# Patient Record
Sex: FEMALE | Race: BLACK OR AFRICAN AMERICAN | NOT HISPANIC OR LATINO | Employment: OTHER | URBAN - METROPOLITAN AREA
[De-identification: names, ages, dates, MRNs, and addresses within clinical notes are randomized per-mention and may not be internally consistent; named-entity substitution may affect disease eponyms.]

---

## 2019-12-12 ENCOUNTER — HISTORICAL (OUTPATIENT)
Dept: ADMINISTRATIVE | Facility: HOSPITAL | Age: 84
End: 2019-12-12

## 2019-12-20 ENCOUNTER — HISTORICAL (OUTPATIENT)
Dept: ADMINISTRATIVE | Facility: HOSPITAL | Age: 84
End: 2019-12-20

## 2019-12-27 ENCOUNTER — HISTORICAL (OUTPATIENT)
Dept: ADMINISTRATIVE | Facility: HOSPITAL | Age: 84
End: 2019-12-27

## 2020-01-03 ENCOUNTER — HISTORICAL (OUTPATIENT)
Dept: ADMINISTRATIVE | Facility: HOSPITAL | Age: 85
End: 2020-01-03

## 2020-01-10 ENCOUNTER — HISTORICAL (OUTPATIENT)
Dept: ADMINISTRATIVE | Facility: HOSPITAL | Age: 85
End: 2020-01-10

## 2020-01-17 ENCOUNTER — HISTORICAL (OUTPATIENT)
Dept: ADMINISTRATIVE | Facility: HOSPITAL | Age: 85
End: 2020-01-17

## 2020-01-24 ENCOUNTER — HISTORICAL (OUTPATIENT)
Dept: ADMINISTRATIVE | Facility: HOSPITAL | Age: 85
End: 2020-01-24

## 2020-01-31 ENCOUNTER — HISTORICAL (OUTPATIENT)
Dept: ADMINISTRATIVE | Facility: HOSPITAL | Age: 85
End: 2020-01-31

## 2020-02-14 ENCOUNTER — HISTORICAL (OUTPATIENT)
Dept: ADMINISTRATIVE | Facility: HOSPITAL | Age: 85
End: 2020-02-14

## 2020-02-21 ENCOUNTER — HISTORICAL (OUTPATIENT)
Dept: ADMINISTRATIVE | Facility: HOSPITAL | Age: 85
End: 2020-02-21

## 2021-01-12 ENCOUNTER — HISTORICAL (OUTPATIENT)
Dept: ADMINISTRATIVE | Facility: HOSPITAL | Age: 86
End: 2021-01-12

## 2021-01-12 LAB
ABS NEUT (OLG): 4.04 X10(3)/MCL (ref 2.1–9.2)
ALBUMIN SERPL-MCNC: 3.3 GM/DL (ref 3.4–4.8)
ALBUMIN/GLOB SERPL: 1 RATIO (ref 1.1–2)
ALP SERPL-CCNC: 108 UNIT/L (ref 40–150)
ALT SERPL-CCNC: 27 UNIT/L (ref 0–55)
AST SERPL-CCNC: 31 UNIT/L (ref 5–34)
BASOPHILS # BLD AUTO: 0 X10(3)/MCL (ref 0–0.2)
BASOPHILS NFR BLD AUTO: 0 %
BILIRUB SERPL-MCNC: 0.5 MG/DL
BILIRUBIN DIRECT+TOT PNL SERPL-MCNC: 0.2 MG/DL (ref 0–0.5)
BILIRUBIN DIRECT+TOT PNL SERPL-MCNC: 0.3 MG/DL (ref 0–0.8)
BUN SERPL-MCNC: 19 MG/DL (ref 9.8–20.1)
CALCIUM SERPL-MCNC: 8.6 MG/DL (ref 8.4–10.2)
CHLORIDE SERPL-SCNC: 106 MMOL/L (ref 98–107)
CHOLEST SERPL-MCNC: 201 MG/DL
CHOLEST/HDLC SERPL: 4 {RATIO} (ref 0–5)
CO2 SERPL-SCNC: 27 MMOL/L (ref 23–31)
CREAT SERPL-MCNC: 0.85 MG/DL (ref 0.55–1.02)
DEPRECATED CALCIDIOL+CALCIFEROL SERPL-MC: 67.4 NG/ML (ref 30–80)
EOSINOPHIL # BLD AUTO: 0.3 X10(3)/MCL (ref 0–0.9)
EOSINOPHIL NFR BLD AUTO: 4 %
ERYTHROCYTE [DISTWIDTH] IN BLOOD BY AUTOMATED COUNT: 13.4 % (ref 11.5–17)
GLOBULIN SER-MCNC: 3.4 GM/DL (ref 2.4–3.5)
GLUCOSE SERPL-MCNC: 100 MG/DL (ref 82–115)
HCT VFR BLD AUTO: 32.6 % (ref 37–47)
HDLC SERPL-MCNC: 53 MG/DL (ref 35–60)
HGB BLD-MCNC: 10.4 GM/DL (ref 12–16)
LDLC SERPL CALC-MCNC: 130 MG/DL (ref 50–140)
LYMPHOCYTES # BLD AUTO: 2.4 X10(3)/MCL (ref 0.6–4.6)
LYMPHOCYTES NFR BLD AUTO: 32 %
MCH RBC QN AUTO: 27.7 PG (ref 27–31)
MCHC RBC AUTO-ENTMCNC: 31.9 GM/DL (ref 33–36)
MCV RBC AUTO: 86.9 FL (ref 80–94)
MONOCYTES # BLD AUTO: 0.7 X10(3)/MCL (ref 0.1–1.3)
MONOCYTES NFR BLD AUTO: 10 %
NEUTROPHILS # BLD AUTO: 4.04 X10(3)/MCL (ref 2.1–9.2)
NEUTROPHILS NFR BLD AUTO: 54 %
PLATELET # BLD AUTO: 214 X10(3)/MCL (ref 130–400)
PMV BLD AUTO: 11.5 FL (ref 9.4–12.4)
POTASSIUM SERPL-SCNC: 4.2 MMOL/L (ref 3.5–5.1)
PROT SERPL-MCNC: 6.7 GM/DL (ref 5.8–7.6)
RBC # BLD AUTO: 3.75 X10(6)/MCL (ref 4.2–5.4)
SODIUM SERPL-SCNC: 144 MMOL/L (ref 136–145)
TRIGL SERPL-MCNC: 92 MG/DL (ref 37–140)
TSH SERPL-ACNC: 2.46 UIU/ML (ref 0.35–4.94)
VIT B12 SERPL-MCNC: 576 PG/ML (ref 213–816)
VLDLC SERPL CALC-MCNC: 18 MG/DL
WBC # SPEC AUTO: 7.5 X10(3)/MCL (ref 4.5–11.5)

## 2021-02-23 ENCOUNTER — HOSPITAL ENCOUNTER (OUTPATIENT)
Dept: NUTRITION | Facility: HOSPITAL | Age: 86
End: 2021-02-24
Attending: INTERNAL MEDICINE | Admitting: INTERNAL MEDICINE

## 2021-02-23 LAB
ABS NEUT (OLG): 4.84 X10(3)/MCL (ref 2.1–9.2)
ALBUMIN SERPL-MCNC: 3.2 GM/DL (ref 3.4–4.8)
ALBUMIN/GLOB SERPL: 0.9 RATIO (ref 1.1–2)
ALP SERPL-CCNC: 100 UNIT/L (ref 40–150)
ALT SERPL-CCNC: 10 UNIT/L (ref 0–55)
AST SERPL-CCNC: 12 UNIT/L (ref 5–34)
BASOPHILS # BLD AUTO: 0 X10(3)/MCL (ref 0–0.2)
BASOPHILS NFR BLD AUTO: 0 %
BILIRUB SERPL-MCNC: 0.4 MG/DL
BILIRUBIN DIRECT+TOT PNL SERPL-MCNC: 0.2 MG/DL (ref 0–0.5)
BILIRUBIN DIRECT+TOT PNL SERPL-MCNC: 0.2 MG/DL (ref 0–0.8)
BUN SERPL-MCNC: 21.2 MG/DL (ref 9.8–20.1)
CALCIUM SERPL-MCNC: 8.7 MG/DL (ref 8.4–10.2)
CHLORIDE SERPL-SCNC: 107 MMOL/L (ref 98–107)
CO2 SERPL-SCNC: 29 MMOL/L (ref 23–31)
CREAT SERPL-MCNC: 0.98 MG/DL (ref 0.55–1.02)
EOSINOPHIL # BLD AUTO: 0.3 X10(3)/MCL (ref 0–0.9)
EOSINOPHIL NFR BLD AUTO: 4 %
ERYTHROCYTE [DISTWIDTH] IN BLOOD BY AUTOMATED COUNT: 13.5 % (ref 11.5–17)
GLOBULIN SER-MCNC: 3.5 GM/DL (ref 2.4–3.5)
GLUCOSE SERPL-MCNC: 132 MG/DL (ref 82–115)
HCT VFR BLD AUTO: 34.8 % (ref 37–47)
HGB BLD-MCNC: 11.2 GM/DL (ref 12–16)
LYMPHOCYTES # BLD AUTO: 2.7 X10(3)/MCL (ref 0.6–4.6)
LYMPHOCYTES NFR BLD AUTO: 30 %
MCH RBC QN AUTO: 27.7 PG (ref 27–31)
MCHC RBC AUTO-ENTMCNC: 32.2 GM/DL (ref 33–36)
MCV RBC AUTO: 85.9 FL (ref 80–94)
MONOCYTES # BLD AUTO: 1 X10(3)/MCL (ref 0.1–1.3)
MONOCYTES NFR BLD AUTO: 11 %
NEUTROPHILS # BLD AUTO: 4.84 X10(3)/MCL (ref 2.1–9.2)
NEUTROPHILS NFR BLD AUTO: 54 %
PLATELET # BLD AUTO: 256 X10(3)/MCL (ref 130–400)
PMV BLD AUTO: 11 FL (ref 9.4–12.4)
POTASSIUM SERPL-SCNC: 4.4 MMOL/L (ref 3.5–5.1)
PROT SERPL-MCNC: 6.7 GM/DL (ref 5.8–7.6)
RBC # BLD AUTO: 4.05 X10(6)/MCL (ref 4.2–5.4)
SODIUM SERPL-SCNC: 142 MMOL/L (ref 136–145)
TROPONIN I SERPL-MCNC: 0.01 NG/ML (ref 0–0.04)
TROPONIN I SERPL-MCNC: 0.02 NG/ML (ref 0–0.04)
TROPONIN I SERPL-MCNC: <0.01 NG/ML (ref 0–0.04)
WBC # SPEC AUTO: 8.9 X10(3)/MCL (ref 4.5–11.5)

## 2021-02-24 LAB
ABS NEUT (OLG): 6.28 X10(3)/MCL (ref 2.1–9.2)
ALBUMIN SERPL-MCNC: 3 GM/DL (ref 3.4–4.8)
ALBUMIN/GLOB SERPL: 1 RATIO (ref 1.1–2)
ALP SERPL-CCNC: 91 UNIT/L (ref 40–150)
ALT SERPL-CCNC: 11 UNIT/L (ref 0–55)
AST SERPL-CCNC: 11 UNIT/L (ref 5–34)
BASOPHILS # BLD AUTO: 0 X10(3)/MCL (ref 0–0.2)
BASOPHILS NFR BLD AUTO: 0 %
BILIRUB SERPL-MCNC: 0.5 MG/DL
BILIRUBIN DIRECT+TOT PNL SERPL-MCNC: 0.2 MG/DL (ref 0–0.5)
BILIRUBIN DIRECT+TOT PNL SERPL-MCNC: 0.3 MG/DL (ref 0–0.8)
BUN SERPL-MCNC: 21.8 MG/DL (ref 9.8–20.1)
CALCIUM SERPL-MCNC: 8.5 MG/DL (ref 8.4–10.2)
CHLORIDE SERPL-SCNC: 105 MMOL/L (ref 98–107)
CHOLEST SERPL-MCNC: 186 MG/DL
CHOLEST/HDLC SERPL: 4 {RATIO} (ref 0–5)
CO2 SERPL-SCNC: 27 MMOL/L (ref 23–31)
CREAT SERPL-MCNC: 0.85 MG/DL (ref 0.55–1.02)
EOSINOPHIL # BLD AUTO: 0.1 X10(3)/MCL (ref 0–0.9)
EOSINOPHIL NFR BLD AUTO: 2 %
ERYTHROCYTE [DISTWIDTH] IN BLOOD BY AUTOMATED COUNT: 13.2 % (ref 11.5–17)
GLOBULIN SER-MCNC: 3.1 GM/DL (ref 2.4–3.5)
GLUCOSE SERPL-MCNC: 121 MG/DL (ref 82–115)
HCT VFR BLD AUTO: 30.9 % (ref 37–47)
HDLC SERPL-MCNC: 45 MG/DL (ref 35–60)
HGB BLD-MCNC: 10.1 GM/DL (ref 12–16)
LDLC SERPL CALC-MCNC: 126 MG/DL (ref 50–140)
LYMPHOCYTES # BLD AUTO: 1.6 X10(3)/MCL (ref 0.6–4.6)
LYMPHOCYTES NFR BLD AUTO: 19 %
MAGNESIUM SERPL-MCNC: 2.1 MG/DL (ref 1.6–2.6)
MCH RBC QN AUTO: 28.1 PG (ref 27–31)
MCHC RBC AUTO-ENTMCNC: 32.7 GM/DL (ref 33–36)
MCV RBC AUTO: 85.8 FL (ref 80–94)
MONOCYTES # BLD AUTO: 0.7 X10(3)/MCL (ref 0.1–1.3)
MONOCYTES NFR BLD AUTO: 8 %
NEUTROPHILS # BLD AUTO: 6.28 X10(3)/MCL (ref 2.1–9.2)
NEUTROPHILS NFR BLD AUTO: 71 %
PLATELET # BLD AUTO: 203 X10(3)/MCL (ref 130–400)
PMV BLD AUTO: 10.5 FL (ref 7.4–10.4)
POTASSIUM SERPL-SCNC: 4.1 MMOL/L (ref 3.5–5.1)
PROT SERPL-MCNC: 6.1 GM/DL (ref 5.8–7.6)
RBC # BLD AUTO: 3.6 X10(6)/MCL (ref 4.2–5.4)
SODIUM SERPL-SCNC: 141 MMOL/L (ref 136–145)
TRIGL SERPL-MCNC: 76 MG/DL (ref 37–140)
TROPONIN I SERPL-MCNC: 0.02 NG/ML (ref 0–0.04)
TSH SERPL-ACNC: 3.18 UIU/ML (ref 0.35–4.94)
VLDLC SERPL CALC-MCNC: 15 MG/DL
WBC # SPEC AUTO: 8.8 X10(3)/MCL (ref 4.5–11.5)

## 2021-08-16 ENCOUNTER — HISTORICAL (OUTPATIENT)
Dept: ADMINISTRATIVE | Facility: HOSPITAL | Age: 86
End: 2021-08-16

## 2021-08-16 LAB
BUN SERPL-MCNC: 17.9 MG/DL (ref 9.8–20.1)
CALCIUM SERPL-MCNC: 9.4 MG/DL (ref 8.4–10.2)
CHLORIDE SERPL-SCNC: 104 MMOL/L (ref 98–107)
CO2 SERPL-SCNC: 25 MMOL/L (ref 23–31)
CREAT SERPL-MCNC: 0.89 MG/DL (ref 0.55–1.02)
CREAT/UREA NIT SERPL: 20
DEPRECATED CALCIDIOL+CALCIFEROL SERPL-MC: 96.2 NG/ML (ref 30–80)
EST. AVERAGE GLUCOSE BLD GHB EST-MCNC: 91.1 MG/DL
GLUCOSE SERPL-MCNC: 89 MG/DL (ref 82–115)
HBA1C MFR BLD: 4.8 %
POTASSIUM SERPL-SCNC: 4.7 MMOL/L (ref 3.5–5.1)
SODIUM SERPL-SCNC: 140 MMOL/L (ref 136–145)

## 2021-12-21 ENCOUNTER — HISTORICAL (OUTPATIENT)
Dept: ADMINISTRATIVE | Facility: HOSPITAL | Age: 86
End: 2021-12-21

## 2021-12-21 LAB — DEPRECATED CALCIDIOL+CALCIFEROL SERPL-MC: 75.1 NG/ML (ref 30–80)

## 2022-01-01 ENCOUNTER — HOSPITAL ENCOUNTER (EMERGENCY)
Facility: HOSPITAL | Age: 87
Discharge: HOME | End: 2022-11-27
Attending: INTERNAL MEDICINE
Payer: MEDICARE

## 2022-01-01 ENCOUNTER — TELEPHONE (OUTPATIENT)
Dept: HEPATOLOGY | Facility: HOSPITAL | Age: 87
End: 2022-01-01
Payer: MEDICARE

## 2022-01-01 ENCOUNTER — TELEPHONE (OUTPATIENT)
Dept: INTERNAL MEDICINE | Facility: CLINIC | Age: 87
End: 2022-01-01
Payer: MEDICARE

## 2022-01-01 ENCOUNTER — TELEPHONE (OUTPATIENT)
Dept: INTERNAL MEDICINE | Facility: CLINIC | Age: 87
End: 2022-01-01

## 2022-01-01 ENCOUNTER — PATIENT OUTREACH (OUTPATIENT)
Dept: HOME HEALTH SERVICES | Facility: HOSPITAL | Age: 87
End: 2022-01-01
Payer: MEDICARE

## 2022-01-01 ENCOUNTER — OFFICE VISIT (OUTPATIENT)
Dept: INTERNAL MEDICINE | Facility: CLINIC | Age: 87
End: 2022-01-01
Payer: MEDICARE

## 2022-01-01 ENCOUNTER — EXTERNAL HOME HEALTH (OUTPATIENT)
Dept: HOME HEALTH SERVICES | Facility: HOSPITAL | Age: 87
End: 2022-01-01
Payer: MEDICARE

## 2022-01-01 ENCOUNTER — HOSPITAL ENCOUNTER (EMERGENCY)
Facility: HOSPITAL | Age: 87
Discharge: HOME | End: 2022-12-22
Attending: STUDENT IN AN ORGANIZED HEALTH CARE EDUCATION/TRAINING PROGRAM
Payer: MEDICARE

## 2022-01-01 VITALS
OXYGEN SATURATION: 99 % | HEART RATE: 82 BPM | DIASTOLIC BLOOD PRESSURE: 68 MMHG | TEMPERATURE: 98 F | WEIGHT: 154.63 LBS | SYSTOLIC BLOOD PRESSURE: 128 MMHG | RESPIRATION RATE: 16 BRPM | HEIGHT: 64 IN | BODY MASS INDEX: 26.4 KG/M2

## 2022-01-01 VITALS
RESPIRATION RATE: 22 BRPM | OXYGEN SATURATION: 98 % | HEIGHT: 64 IN | HEART RATE: 76 BPM | SYSTOLIC BLOOD PRESSURE: 128 MMHG | WEIGHT: 154 LBS | DIASTOLIC BLOOD PRESSURE: 77 MMHG | TEMPERATURE: 97 F | BODY MASS INDEX: 26.29 KG/M2

## 2022-01-01 VITALS
TEMPERATURE: 98 F | SYSTOLIC BLOOD PRESSURE: 158 MMHG | HEIGHT: 64 IN | HEART RATE: 81 BPM | DIASTOLIC BLOOD PRESSURE: 89 MMHG | BODY MASS INDEX: 26.29 KG/M2 | RESPIRATION RATE: 19 BRPM | WEIGHT: 154 LBS | OXYGEN SATURATION: 98 %

## 2022-01-01 DIAGNOSIS — R03.0 ELEVATED BLOOD PRESSURE READING: ICD-10-CM

## 2022-01-01 DIAGNOSIS — R30.0 DYSURIA: Primary | ICD-10-CM

## 2022-01-01 DIAGNOSIS — N18.31 CHRONIC KIDNEY DISEASE, STAGE 3A: ICD-10-CM

## 2022-01-01 DIAGNOSIS — R41.81 AGE-RELATED COGNITIVE DECLINE: ICD-10-CM

## 2022-01-01 DIAGNOSIS — R05.9 COUGH, UNSPECIFIED TYPE: ICD-10-CM

## 2022-01-01 DIAGNOSIS — G30.1 LATE ONSET ALZHEIMER'S DEMENTIA WITHOUT BEHAVIORAL DISTURBANCE: ICD-10-CM

## 2022-01-01 DIAGNOSIS — F02.80 LATE ONSET ALZHEIMER'S DEMENTIA WITHOUT BEHAVIORAL DISTURBANCE: ICD-10-CM

## 2022-01-01 DIAGNOSIS — R53.81 PHYSICAL DECONDITIONING: Primary | ICD-10-CM

## 2022-01-01 DIAGNOSIS — E11.59 TYPE 2 DIABETES MELLITUS WITH OTHER CIRCULATORY COMPLICATIONS: ICD-10-CM

## 2022-01-01 DIAGNOSIS — N93.9 VAGINAL BLEEDING: Primary | ICD-10-CM

## 2022-01-01 DIAGNOSIS — F01.518 VASCULAR DEMENTIA WITH BEHAVIORAL DISTURBANCE: ICD-10-CM

## 2022-01-01 DIAGNOSIS — R30.0 DYSURIA: ICD-10-CM

## 2022-01-01 DIAGNOSIS — U07.1 COVID-19: Primary | ICD-10-CM

## 2022-01-01 DIAGNOSIS — R09.81 CHRONIC NASAL CONGESTION: Primary | ICD-10-CM

## 2022-01-01 DIAGNOSIS — R52 BODY ACHES: ICD-10-CM

## 2022-01-01 DIAGNOSIS — N39.0 URINARY TRACT INFECTION WITHOUT HEMATURIA, SITE UNSPECIFIED: Primary | ICD-10-CM

## 2022-01-01 DIAGNOSIS — R50.9 FEVER, UNSPECIFIED FEVER CAUSE: ICD-10-CM

## 2022-01-01 DIAGNOSIS — E55.9 VITAMIN D DEFICIENCY: ICD-10-CM

## 2022-01-01 DIAGNOSIS — R51.9 FRONTAL HEADACHE: Primary | ICD-10-CM

## 2022-01-01 DIAGNOSIS — U07.1 COVID-19 VIRUS DETECTED: ICD-10-CM

## 2022-01-01 DIAGNOSIS — I48.91 ATRIAL FIBRILLATION WITH NORMAL VENTRICULAR RATE: ICD-10-CM

## 2022-01-01 DIAGNOSIS — N93.9 VAGINAL BLEEDING: ICD-10-CM

## 2022-01-01 DIAGNOSIS — L30.9 ECZEMA, UNSPECIFIED TYPE: ICD-10-CM

## 2022-01-01 DIAGNOSIS — I73.9 PAD (PERIPHERAL ARTERY DISEASE): ICD-10-CM

## 2022-01-01 DIAGNOSIS — R53.1 WEAKNESS: ICD-10-CM

## 2022-01-01 LAB
ALBUMIN SERPL-MCNC: 3.6 G/DL (ref 3.4–4.8)
ALBUMIN SERPL-MCNC: 3.6 GM/DL (ref 3.4–4.8)
ALBUMIN/GLOB SERPL: 0.9 RATIO (ref 1.1–2)
ALBUMIN/GLOB SERPL: 1 RATIO (ref 1.1–2)
ALP SERPL-CCNC: 84 UNIT/L (ref 40–150)
ALP SERPL-CCNC: 90 UNIT/L (ref 40–150)
ALT SERPL-CCNC: 22 UNIT/L (ref 0–55)
ALT SERPL-CCNC: 36 UNIT/L (ref 0–55)
AMORPH URATE CRY URNS QL MICRO: ABNORMAL /HPF
APPEARANCE UR: ABNORMAL
APPEARANCE UR: ABNORMAL
APPEARANCE UR: CLEAR
APPEARANCE UR: CLEAR
AST SERPL-CCNC: 21 UNIT/L (ref 5–34)
AST SERPL-CCNC: 30 UNIT/L (ref 5–34)
BACTERIA #/AREA URNS AUTO: ABNORMAL /HPF
BACTERIA UR CULT: ABNORMAL
BACTERIA UR CULT: ABNORMAL
BASOPHILS # BLD AUTO: 0.02 X10(3)/MCL (ref 0–0.2)
BASOPHILS # BLD AUTO: 0.02 X10(3)/MCL (ref 0–0.2)
BASOPHILS NFR BLD AUTO: 0.3 %
BASOPHILS NFR BLD AUTO: 0.4 %
BILIRUB UR QL STRIP.AUTO: NEGATIVE MG/DL
BILIRUBIN DIRECT+TOT PNL SERPL-MCNC: 0.6 MG/DL
BILIRUBIN DIRECT+TOT PNL SERPL-MCNC: 0.9 MG/DL
BUN SERPL-MCNC: 20.9 MG/DL (ref 9.8–20.1)
BUN SERPL-MCNC: 28.1 MG/DL (ref 9.8–20.1)
CALCIUM SERPL-MCNC: 9.3 MG/DL (ref 8.4–10.2)
CALCIUM SERPL-MCNC: 9.6 MG/DL (ref 8.4–10.2)
CAOX CRY URNS QL MICRO: ABNORMAL /HPF
CHLORIDE SERPL-SCNC: 104 MMOL/L (ref 98–107)
CHLORIDE SERPL-SCNC: 108 MMOL/L (ref 98–107)
CO2 SERPL-SCNC: 24 MMOL/L (ref 23–31)
CO2 SERPL-SCNC: 26 MMOL/L (ref 23–31)
COLOR UR AUTO: ABNORMAL
COLOR UR AUTO: YELLOW
CREAT SERPL-MCNC: 0.94 MG/DL (ref 0.55–1.02)
CREAT SERPL-MCNC: 1.06 MG/DL (ref 0.55–1.02)
EOSINOPHIL # BLD AUTO: 0.09 X10(3)/MCL (ref 0–0.9)
EOSINOPHIL # BLD AUTO: 0.12 X10(3)/MCL (ref 0–0.9)
EOSINOPHIL NFR BLD AUTO: 1.7 %
EOSINOPHIL NFR BLD AUTO: 1.7 %
ERYTHROCYTE [DISTWIDTH] IN BLOOD BY AUTOMATED COUNT: 13.2 % (ref 11.5–17)
ERYTHROCYTE [DISTWIDTH] IN BLOOD BY AUTOMATED COUNT: 13.2 % (ref 11–14.5)
FLUAV AG UPPER RESP QL IA.RAPID: NOT DETECTED
FLUAV AG UPPER RESP QL IA.RAPID: NOT DETECTED
FLUBV AG UPPER RESP QL IA.RAPID: NOT DETECTED
FLUBV AG UPPER RESP QL IA.RAPID: NOT DETECTED
GFR SERPLBLD CREATININE-BSD FMLA CKD-EPI: 51 MLS/MIN/1.73/M2
GFR SERPLBLD CREATININE-BSD FMLA CKD-EPI: 59 MLS/MIN/1.73/M2
GLOBULIN SER-MCNC: 3.7 GM/DL (ref 2.4–3.5)
GLOBULIN SER-MCNC: 3.9 GM/DL (ref 2.4–3.5)
GLUCOSE SERPL-MCNC: 119 MG/DL (ref 82–115)
GLUCOSE SERPL-MCNC: 143 MG/DL (ref 82–115)
GLUCOSE UR QL STRIP.AUTO: NEGATIVE MG/DL
HCT VFR BLD AUTO: 32.7 % (ref 37–47)
HCT VFR BLD AUTO: 38.3 % (ref 37–47)
HGB BLD-MCNC: 11.2 GM/DL (ref 12–16)
HGB BLD-MCNC: 12.8 GM/DL (ref 12–16)
IMM GRANULOCYTES # BLD AUTO: 0.01 X10(3)/MCL (ref 0–0.04)
IMM GRANULOCYTES # BLD AUTO: 0.01 X10(3)/MCL (ref 0–0.04)
IMM GRANULOCYTES NFR BLD AUTO: 0.1 %
IMM GRANULOCYTES NFR BLD AUTO: 0.2 %
KETONES UR QL STRIP.AUTO: ABNORMAL MG/DL
KETONES UR QL STRIP.AUTO: NEGATIVE MG/DL
LEUKOCYTE ESTERASE UR QL STRIP.AUTO: ABNORMAL UNIT/L
LEUKOCYTE ESTERASE UR QL STRIP.AUTO: ABNORMAL UNIT/L
LEUKOCYTE ESTERASE UR QL STRIP.AUTO: NEGATIVE UNIT/L
LEUKOCYTE ESTERASE UR QL STRIP.AUTO: NEGATIVE UNIT/L
LYMPHOCYTES # BLD AUTO: 1.87 X10(3)/MCL (ref 0.6–4.6)
LYMPHOCYTES # BLD AUTO: 2.69 X10(3)/MCL (ref 0.6–4.6)
LYMPHOCYTES NFR BLD AUTO: 27 %
LYMPHOCYTES NFR BLD AUTO: 51.9 %
MAGNESIUM SERPL-MCNC: 1.7 MG/DL (ref 1.6–2.6)
MAGNESIUM SERPL-MCNC: 1.9 MG/DL (ref 1.6–2.6)
MCH RBC QN AUTO: 27.6 PG
MCH RBC QN AUTO: 28.3 PG (ref 27–31)
MCHC RBC AUTO-ENTMCNC: 33.4 MG/DL (ref 33–36)
MCHC RBC AUTO-ENTMCNC: 34.3 MG/DL (ref 33–36)
MCV RBC AUTO: 82.5 FL (ref 80–94)
MCV RBC AUTO: 82.6 FL (ref 80–94)
MONOCYTES # BLD AUTO: 0.52 X10(3)/MCL (ref 0.1–1.3)
MONOCYTES # BLD AUTO: 0.61 X10(3)/MCL (ref 0.1–1.3)
MONOCYTES NFR BLD AUTO: 10 %
MONOCYTES NFR BLD AUTO: 8.8 %
NEUTROPHILS # BLD AUTO: 1.85 X10(3)/MCL (ref 2.1–9.2)
NEUTROPHILS # BLD AUTO: 4.3 X10(3)/MCL (ref 2.1–9.2)
NEUTROPHILS NFR BLD AUTO: 35.8 %
NEUTROPHILS NFR BLD AUTO: 62.1 %
NITRITE UR QL STRIP.AUTO: NEGATIVE
NITRITE UR QL STRIP.AUTO: POSITIVE
NRBC BLD AUTO-RTO: 0 %
NRBC BLD AUTO-RTO: 0 % (ref 0–1)
PH UR STRIP.AUTO: 5.5 [PH]
PH UR STRIP.AUTO: 6 [PH]
PLATELET # BLD AUTO: 148 X10(3)/MCL (ref 140–371)
PLATELET # BLD AUTO: 189 X10(3)/MCL (ref 130–400)
PMV BLD AUTO: 10.4 FL (ref 9.4–12.4)
PMV BLD AUTO: 10.9 FL (ref 7.4–10.4)
POTASSIUM SERPL-SCNC: 4.1 MMOL/L (ref 3.5–5.1)
POTASSIUM SERPL-SCNC: 4.2 MMOL/L (ref 3.5–5.1)
PROT SERPL-MCNC: 7.3 GM/DL (ref 5.8–7.6)
PROT SERPL-MCNC: 7.5 GM/DL (ref 5.8–7.6)
PROT UR QL STRIP.AUTO: 30 MG/DL
PROT UR QL STRIP.AUTO: ABNORMAL MG/DL
RBC # BLD AUTO: 3.96 X10(6)/MCL (ref 4.2–5.4)
RBC # BLD AUTO: 4.64 X10(6)/MCL (ref 4.2–5.4)
RBC #/AREA URNS AUTO: ABNORMAL /HPF
RBC UR QL AUTO: ABNORMAL UNIT/L
RBC UR QL AUTO: ABNORMAL UNIT/L
RBC UR QL AUTO: NEGATIVE UNIT/L
RBC UR QL AUTO: NEGATIVE UNIT/L
SARS-COV-2 RNA RESP QL NAA+PROBE: DETECTED
SARS-COV-2 RNA RESP QL NAA+PROBE: NOT DETECTED
SODIUM SERPL-SCNC: 141 MMOL/L (ref 136–145)
SODIUM SERPL-SCNC: 143 MMOL/L (ref 136–145)
SP GR UR STRIP.AUTO: 1.02 (ref 1–1.03)
SP GR UR STRIP.AUTO: 1.03 (ref 1–1.03)
SP GR UR STRIP.AUTO: >=1.03
SP GR UR STRIP.AUTO: >=1.03 (ref 1–1.03)
SQUAMOUS #/AREA URNS AUTO: 6 /HPF
SQUAMOUS #/AREA URNS AUTO: <5 /HPF
SQUAMOUS #/AREA URNS AUTO: ABNORMAL /HPF
SQUAMOUS #/AREA URNS AUTO: ABNORMAL /HPF
TROPONIN I SERPL-MCNC: 0.03 NG/ML (ref 0–0.04)
TSH SERPL-ACNC: 1.4 UIU/ML (ref 0.35–4.94)
UROBILINOGEN UR STRIP-ACNC: 0.2 MG/DL
UROBILINOGEN UR STRIP-ACNC: 1 MG/DL
WBC # SPEC AUTO: 5.2 X10(3)/MCL (ref 4.5–11.5)
WBC # SPEC AUTO: 6.9 X10(3)/MCL (ref 4.5–11.5)
WBC #/AREA URNS AUTO: 22 /HPF
WBC #/AREA URNS AUTO: 634 /HPF
WBC #/AREA URNS AUTO: ABNORMAL /HPF
WBC #/AREA URNS AUTO: ABNORMAL /HPF
YEAST URNS QL MICRO: ABNORMAL /HPF
YEAST URNS QL MICRO: ABNORMAL /HPF

## 2022-01-01 PROCEDURE — 81001 URINALYSIS AUTO W/SCOPE: CPT | Performed by: STUDENT IN AN ORGANIZED HEALTH CARE EDUCATION/TRAINING PROGRAM

## 2022-01-01 PROCEDURE — 81003 URINALYSIS AUTO W/O SCOPE: CPT | Performed by: STUDENT IN AN ORGANIZED HEALTH CARE EDUCATION/TRAINING PROGRAM

## 2022-01-01 PROCEDURE — 81003 URINALYSIS AUTO W/O SCOPE: CPT | Performed by: INTERNAL MEDICINE

## 2022-01-01 PROCEDURE — 99285 EMERGENCY DEPT VISIT HI MDM: CPT | Mod: 25

## 2022-01-01 PROCEDURE — 80053 COMPREHEN METABOLIC PANEL: CPT | Performed by: INTERNAL MEDICINE

## 2022-01-01 PROCEDURE — 81001 URINALYSIS AUTO W/SCOPE: CPT | Performed by: INTERNAL MEDICINE

## 2022-01-01 PROCEDURE — 0240U COVID/FLU A&B PCR: CPT | Performed by: INTERNAL MEDICINE

## 2022-01-01 PROCEDURE — 96361 HYDRATE IV INFUSION ADD-ON: CPT

## 2022-01-01 PROCEDURE — 63600175 PHARM REV CODE 636 W HCPCS: Performed by: INTERNAL MEDICINE

## 2022-01-01 PROCEDURE — 85025 COMPLETE CBC W/AUTO DIFF WBC: CPT | Performed by: INTERNAL MEDICINE

## 2022-01-01 PROCEDURE — 84484 ASSAY OF TROPONIN QUANT: CPT | Performed by: STUDENT IN AN ORGANIZED HEALTH CARE EDUCATION/TRAINING PROGRAM

## 2022-01-01 PROCEDURE — 93005 ELECTROCARDIOGRAM TRACING: CPT

## 2022-01-01 PROCEDURE — 99214 PR OFFICE/OUTPT VISIT, EST, LEVL IV, 30-39 MIN: ICD-10-PCS | Mod: ,,, | Performed by: INTERNAL MEDICINE

## 2022-01-01 PROCEDURE — 93010 EKG 12-LEAD: ICD-10-PCS | Mod: ,,, | Performed by: INTERNAL MEDICINE

## 2022-01-01 PROCEDURE — 99214 PR OFFICE/OUTPT VISIT, EST, LEVL IV, 30-39 MIN: ICD-10-PCS | Mod: 95,,, | Performed by: NURSE PRACTITIONER

## 2022-01-01 PROCEDURE — 80053 COMPREHEN METABOLIC PANEL: CPT | Performed by: STUDENT IN AN ORGANIZED HEALTH CARE EDUCATION/TRAINING PROGRAM

## 2022-01-01 PROCEDURE — 99284 EMERGENCY DEPT VISIT MOD MDM: CPT | Mod: 25

## 2022-01-01 PROCEDURE — 85025 COMPLETE CBC W/AUTO DIFF WBC: CPT | Performed by: STUDENT IN AN ORGANIZED HEALTH CARE EDUCATION/TRAINING PROGRAM

## 2022-01-01 PROCEDURE — 87077 CULTURE AEROBIC IDENTIFY: CPT | Performed by: INTERNAL MEDICINE

## 2022-01-01 PROCEDURE — 83735 ASSAY OF MAGNESIUM: CPT | Performed by: STUDENT IN AN ORGANIZED HEALTH CARE EDUCATION/TRAINING PROGRAM

## 2022-01-01 PROCEDURE — 0240U COVID/FLU A&B PCR: CPT | Performed by: STUDENT IN AN ORGANIZED HEALTH CARE EDUCATION/TRAINING PROGRAM

## 2022-01-01 PROCEDURE — 84443 ASSAY THYROID STIM HORMONE: CPT | Performed by: STUDENT IN AN ORGANIZED HEALTH CARE EDUCATION/TRAINING PROGRAM

## 2022-01-01 PROCEDURE — 83735 ASSAY OF MAGNESIUM: CPT | Performed by: INTERNAL MEDICINE

## 2022-01-01 PROCEDURE — 93010 ELECTROCARDIOGRAM REPORT: CPT | Mod: ,,, | Performed by: INTERNAL MEDICINE

## 2022-01-01 PROCEDURE — 96360 HYDRATION IV INFUSION INIT: CPT

## 2022-01-01 PROCEDURE — 99214 OFFICE O/P EST MOD 30 MIN: CPT | Mod: 95,,, | Performed by: NURSE PRACTITIONER

## 2022-01-01 PROCEDURE — 99214 OFFICE O/P EST MOD 30 MIN: CPT | Mod: ,,, | Performed by: INTERNAL MEDICINE

## 2022-01-01 RX ORDER — TRIAMCINOLONE ACETONIDE 1 MG/G
CREAM TOPICAL 2 TIMES DAILY PRN
Qty: 80 G | Refills: 1 | Status: SHIPPED | OUTPATIENT
Start: 2022-01-01 | End: 2023-01-01

## 2022-01-01 RX ORDER — CLONIDINE HYDROCHLORIDE 0.1 MG/1
0.1 TABLET ORAL ONCE
Status: DISCONTINUED | OUTPATIENT
Start: 2022-01-01 | End: 2022-01-01

## 2022-01-01 RX ORDER — CEFUROXIME AXETIL 500 MG/1
500 TABLET ORAL 2 TIMES DAILY
Qty: 10 TABLET | Refills: 0 | Status: SHIPPED | OUTPATIENT
Start: 2022-01-01 | End: 2022-01-01

## 2022-01-01 RX ORDER — LABETALOL HCL 20 MG/4 ML
10 SYRINGE (ML) INTRAVENOUS ONCE
Status: DISCONTINUED | OUTPATIENT
Start: 2022-01-01 | End: 2022-01-01

## 2022-01-01 RX ORDER — SODIUM CHLORIDE, SODIUM LACTATE, POTASSIUM CHLORIDE, CALCIUM CHLORIDE 600; 310; 30; 20 MG/100ML; MG/100ML; MG/100ML; MG/100ML
1000 INJECTION, SOLUTION INTRAVENOUS CONTINUOUS
Status: DISCONTINUED | OUTPATIENT
Start: 2022-01-01 | End: 2022-01-01 | Stop reason: HOSPADM

## 2022-01-01 RX ORDER — CIPROFLOXACIN 250 MG/1
250 TABLET, FILM COATED ORAL 2 TIMES DAILY
Qty: 10 TABLET | Refills: 0 | Status: SHIPPED | OUTPATIENT
Start: 2022-01-01 | End: 2022-01-01 | Stop reason: ALTCHOICE

## 2022-01-01 RX ADMIN — SODIUM CHLORIDE, POTASSIUM CHLORIDE, SODIUM LACTATE AND CALCIUM CHLORIDE 1000 ML: 600; 310; 30; 20 INJECTION, SOLUTION INTRAVENOUS at 05:11

## 2022-02-14 ENCOUNTER — HISTORICAL (OUTPATIENT)
Dept: ADMINISTRATIVE | Facility: HOSPITAL | Age: 87
End: 2022-02-14

## 2022-02-15 LAB
ABS NEUT (OLG): 3.38 (ref 2.1–9.2)
ALBUMIN SERPL-MCNC: 3.3 G/DL (ref 3.4–4.8)
ALBUMIN/GLOB SERPL: 1 {RATIO} (ref 1.1–2)
ALP SERPL-CCNC: 84 U/L (ref 40–150)
ALT SERPL-CCNC: 10 U/L (ref 0–55)
AST SERPL-CCNC: 13 U/L (ref 5–34)
BASOPHILS # BLD AUTO: 0 10*3/UL (ref 0–0.2)
BASOPHILS NFR BLD AUTO: 0 %
BILIRUB SERPL-MCNC: 0.5 MG/DL
BILIRUBIN DIRECT+TOT PNL SERPL-MCNC: 0.2 (ref 0–0.5)
BILIRUBIN DIRECT+TOT PNL SERPL-MCNC: 0.3 (ref 0–0.8)
BUN SERPL-MCNC: 21.3 MG/DL (ref 9.8–20.1)
CALCIUM SERPL-MCNC: 10.2 MG/DL (ref 8.7–10.5)
CHLORIDE SERPL-SCNC: 106 MMOL/L (ref 98–107)
CHOLEST SERPL-MCNC: 209 MG/DL
CHOLEST/HDLC SERPL: 4 {RATIO} (ref 0–5)
CO2 SERPL-SCNC: 30 MMOL/L (ref 23–31)
CREAT SERPL-MCNC: 0.81 MG/DL (ref 0.55–1.02)
DEPRECATED CALCIDIOL+CALCIFEROL SERPL-MC: 115.9 NG/ML (ref 30–80)
EOSINOPHIL # BLD AUTO: 0.1 10*3/UL (ref 0–0.9)
EOSINOPHIL NFR BLD AUTO: 2 %
ERYTHROCYTE [DISTWIDTH] IN BLOOD BY AUTOMATED COUNT: 13.4 % (ref 11.5–17)
GLOBULIN SER-MCNC: 3.2 G/DL (ref 2.4–3.5)
GLUCOSE SERPL-MCNC: 85 MG/DL (ref 82–115)
HCT VFR BLD AUTO: 32.6 % (ref 37–47)
HDLC SERPL-MCNC: 57 MG/DL (ref 35–60)
HEMOLYSIS INTERF INDEX SERPL-ACNC: 7
HGB BLD-MCNC: 10.5 G/DL (ref 12–16)
ICTERIC INTERF INDEX SERPL-ACNC: 1
LDLC SERPL CALC-MCNC: 132 MG/DL (ref 50–140)
LIPEMIC INTERF INDEX SERPL-ACNC: 10
LYMPHOCYTES # BLD AUTO: 2.1 10*3/UL (ref 0.6–4.6)
LYMPHOCYTES NFR BLD AUTO: 34 %
MANUAL DIFF? (OHS): NO
MCH RBC QN AUTO: 27.9 PG (ref 27–31)
MCHC RBC AUTO-ENTMCNC: 32.2 G/DL (ref 33–36)
MCV RBC AUTO: 86.7 FL (ref 80–94)
MONOCYTES # BLD AUTO: 0.6 10*3/UL (ref 0.1–1.3)
MONOCYTES NFR BLD AUTO: 9 %
NEUTROPHILS # BLD AUTO: 3.38 10*3/UL (ref 2.1–9.2)
NEUTROPHILS NFR BLD AUTO: 54 %
PLATELET # BLD AUTO: 192 10*3/UL (ref 130–400)
PMV BLD AUTO: 11.8 FL (ref 9.4–12.4)
POTASSIUM SERPL-SCNC: 4.3 MMOL/L (ref 3.5–5.1)
PROT SERPL-MCNC: 6.5 G/DL (ref 5.8–7.6)
RBC # BLD AUTO: 3.76 10*6/UL (ref 4.2–5.4)
SODIUM SERPL-SCNC: 142 MMOL/L (ref 136–145)
TRIGL SERPL-MCNC: 99 MG/DL (ref 37–140)
VLDLC SERPL CALC-MCNC: 20 MG/DL
WBC # SPEC AUTO: 6.3 10*3/UL (ref 4.5–11.5)

## 2022-03-09 ENCOUNTER — HISTORICAL (OUTPATIENT)
Dept: ADMINISTRATIVE | Facility: HOSPITAL | Age: 87
End: 2022-03-09

## 2022-04-10 ENCOUNTER — HISTORICAL (OUTPATIENT)
Dept: ADMINISTRATIVE | Facility: HOSPITAL | Age: 87
End: 2022-04-10
Payer: MEDICARE

## 2022-04-29 VITALS
SYSTOLIC BLOOD PRESSURE: 124 MMHG | BODY MASS INDEX: 26.45 KG/M2 | DIASTOLIC BLOOD PRESSURE: 56 MMHG | OXYGEN SATURATION: 99 % | HEIGHT: 65 IN | WEIGHT: 158.75 LBS

## 2022-05-01 NOTE — ED PROVIDER NOTES
Patient:   Ally Valdez             MRN: [id number]            FIN: [id number]               Age:   85 years     Sex:  Female     :  1935   Associated Diagnoses:   Syncope; Atrial fibrillation   Author:   Jonatan Garcia MD      Basic Information   Time seen: Date & time 2021 09:38:00.   History source: Family, EMS.   Arrival mode: Ambulance.   History limitation: Dementia..   Additional information: Patient's physician(s): Dong Vogt MD.      History of Present Illness   The patient presents with   86 y/o AAF with a hx of dementia presents to the ED via EMS following a syncopal episode that occurred just PTA. EMS states the Pt passed out sitting in a chair and reports a blood pressure of 60/40 at home. EMS states on arrival the Pt was still slouched but reports movement and states her blood pressure improved when lying down. Daughter at bedside reports decreased fluid intake but denies decreased appetite. Daughter states she checks the Pt's blood pressure, weight, and O2 sats daily and states they have been good at home. .  The onset was just prior to arrival.  The course/duration of symptoms is resolved.  The location where the incident occurred was at home.  The exacerbating factor is none.  The relieving factor is none.  Risk factors consist of age.  Prior episodes: none.  Therapy today: emergency medical services.  Preceding symptoms: none.  Associated symptoms: none.  Associated injury to the none.  Additional history: none.        Review of Systems   Constitutional symptoms:  Decreased fluid intake., No decreased appetite,    Skin symptoms:  Negative except as documented in HPI.   Eye symptoms:  Negative except as documented in HPI.   ENMT symptoms:  Negative except as documented in HPI.   Respiratory symptoms:  Negative except as documented in HPI.   Cardiovascular symptoms:  Syncope.   Gastrointestinal symptoms:  Negative except as documented in HPI.   Genitourinary  symptoms:  Negative except as documented in HPI.   Musculoskeletal symptoms:  Negative except as documented in HPI.   Neurologic symptoms:  Negative except as documented in HPI.   Psychiatric symptoms:  Negative except as documented in HPI.   Endocrine symptoms:  Negative except as documented in HPI.   Hematologic/Lymphatic symptoms:  Negative except as documented in HPI.   Allergy/immunologic symptoms:  Negative except as documented in HPI.             Additional review of systems information: All other systems reviewed and otherwise negative, ROS obtained by family at bedside..      Health Status   Allergies:    Allergic Reactions (Selected)  Severity Not Documented  Lisinopril- Swelling..   Medications:  (Selected)   Prescriptions  Prescribed  Bactroban 2% Ointment (22.5 gmTube): 1 slim, TOP, TID, # 22 gm, 1 Refill(s), Pharmacy: [facility]/pharmacy #[id number]  gentamicin 0.1% topical ointment: 1, TOP, Daily, apply to wound with each dressing change daily, # 15 gm, 1 Refill(s), Pharmacy: [facility]/pharmacy #[id number]  Documented Medications  Documented  aspirin 81 mg oral Delayed Release (EC) tablet: 81 mg = 1 tab(s), Oral, Daily, 0 Refill(s)  atorvastatin 10 mg oral tablet: 10 mg = 1 tab(s), Oral, qPM  furosemide 20 mg oral tablet: 20 mg = 1 tab(s), Oral, Daily  lactulose 10 g/15 mL oral syrup: 10 gm = 15 mL, Oral, TID  nystatin 100,000 units/g topical powder: TOP, BID  risperidone 1 mg/mL oral solution: 1 mg = 1 mL, Oral, qPM  triamcinolone 0.1% topical cream: TOP, TID.      Past Medical/ Family/ Social History   Medical history: Dementia..   Surgical history:     section ([id number]) in  at 38 Years..   Family history:    Father  Alzheimer's disease  Mother  Metastatic cancer  Brother  Cardiac arrest.  .   Social history: Tobacco use: Denies, Family/social situation: .      Physical Examination               Vital Signs   Vital Signs   2021 9:30 CST       Temperature Temporal Artery               36.0 DegC  LOW                              Peripheral Pulse Rate     93 bpm                             Respiratory Rate          18 br/min                             SpO2                      100 %                             Systolic Blood Pressure   131 mmHg                             Diastolic Blood Pressure  65 mmHg  .   Measurements   2/23/2021 9:30 CST       Weight Dosing             68 kg                             Weight Measured and Calculated in Lbs     149.91 lb                             Weight Estimated          68 kg                             Height/Length Dosing      157.48 cm                             Height/Length Estimated   157.48 cm                             Body Mass Index Estimated 27.42 kg/m2  .   Basic Oxygen Information   2/23/2021 9:30 CST       SpO2                      100 %  General:  Alert, no acute distress   Neurological:  No focal neurological deficit observed, CN II-XII intact, normal sensory observed, normal motor observed, normal speech observed, normal coordination observed, Confused..    Skin:  Warm, dry, pink   Head:  Normocephalic, atraumatic   Neck:  Supple, trachea midline, no tenderness, no JVD, no carotid bruit.    Eye:  Pupils are equal, round and reactive to light, extraocular movements are intact, normal conjunctiva, vision unchanged   Ears, nose, mouth and throat:  Tympanic membranes clear, oral mucosa moist, no pharyngeal erythema or exudate   Cardiovascular:  Regular rate and rhythm, Normal peripheral perfusion, No edema, Systolic murmur: 2 /6.    Respiratory:  Lungs are clear to auscultation, respirations are non-labored, breath sounds are equal, Symmetrical chest wall expansion.    Chest wall:  No tenderness, No deformity.    Back:  Nontender, Normal range of motion, Normal alignment   Musculoskeletal:  Normal ROM, normal strength, no tenderness, no swelling, no deformity.    Gastrointestinal:  Soft, Nontender, Non distended, Normal bowel sounds, No organomegaly    Lymphatics:  No lymphadenopathy.   Psychiatric:  Cooperative, appropriate mood & affect, normal judgment      Medical Decision Making   Documents reviewed:  Emergency department nurses' notes.   Orders  Launch Order Profile (Selected)   Inpatient Orders  Ordered  EK21 9:43:00 CST, 21 9:43:00 CST, -1, -1, 21 9:43:00 CST  Orthostatic Vital Signs: 21 9:43:00 CST, Stop date 21 9:43:00 CST  Ordered (Dispatched)  CBC w/ Auto Diff: Stat collect, 21 9:43:00 CST, Blood, Once, Stop date 21 9:43:00 CST, Lab Collect, Print Label By Order Location, 21 9:43:00 CST  CMP: Stat collect, 21 9:43:00 CST, Blood, Once, Stop date 21 9:43:00 CST, Lab Collect, Print Label By Order Location, 21 9:43:00 CST  Troponin-I: Stat collect, 21 9:43:00 CST, Blood, Stop date 21 9:43:00 CST, Lab Collect, Print Label By Order Location, 21 9:43:00 CST  Ordered (Exam Ordered)  CT Head W/O Contrast: Stat, 21 9:43:00 CST, Syncope, None, Stretcher, Rad Type, Schedule this test, 21 9:43:00 CST.   Electrocardiogram:  Rate 98, The Rhythm is atrial fibrillation.  , STT segments Non specific changes, T wave Normal, Ectopy Occasional, premature ventricular contractions, QT interval WNL, QRS interval Right bundle branch block, bifascicular block, Previous EKG available None available.    Results review:  Lab results : Lab View   2021 10:50 CST      Est Creat Clearance Ser   33.19 mL/min    2021 9:54 CST       Sodium Lvl                142 mmol/L                             Potassium Lvl             4.4 mmol/L                             Chloride                  107 mmol/L                             CO2                       29 mmol/L                             Calcium Lvl               8.7 mg/dL                             Glucose Lvl               132 mg/dL  HI                             BUN                       21.2 mg/dL  HI                              Creatinine                0.98 mg/dL                             eGFR-AA                   >60  NA                             eGFR-BETO                  57 mL/min/1.73 m2  NA                             Bili Total                0.4 mg/dL                             Bili Direct               0.2 mg/dL                             Bili Indirect             0.20 mg/dL                             AST                       12 unit/L                             ALT                       10 unit/L                             Alk Phos                  100 unit/L                             Total Protein             6.7 gm/dL                             Albumin Lvl               3.2 gm/dL  LOW                             Globulin                  3.5 gm/dL                             A/G Ratio                 0.9 ratio  LOW                             Troponin-I                <0.010 ng/mL                             WBC                       8.9 x10(3)/mcL                             RBC                       4.05 x10(6)/mcL  LOW                             Hgb                       11.2 gm/dL  LOW                             Hct                       34.8 %  LOW                             Platelet                  256 x10(3)/mcL                             MCV                       85.9 fL                             MCH                       27.7 pg                             MCHC                      32.2 gm/dL  LOW                             RDW                       13.5 %                             MPV                       11.0 fL                             Abs Neut                  4.84 x10(3)/mcL                             Neutro Auto               54 %  NA                             Lymph Auto                30 %  NA                             Mono Auto                 11 %  NA                             Eos Auto                  4 %  NA                             Abs Eos                   0.3  x10(3)/mcL                             Basophil Auto             0 %  NA                             Abs Neutro                4.84 x10(3)/mcL                             Abs Lymph                 2.7 x10(3)/mcL                             Abs Mono                  1.0 x10(3)/mcL                             Abs Baso                  0.0 x10(3)/mcL  .   Radiology results:  Rad Results (ST)  < 12 hrs   Accession: [id number]  Order: CT Head W/O Contrast  Report Dt/Tm: 02/23/2021 11:42  Report:   CT HEAD NONCONTRAST 2/23/2021     INDICATION: Syncope     TECHNIQUE: Multiple axial CT images were obtained from the cranial  vertex through the skull base without the administration of  intravenous contrast and reformatted in the coronal and sagittal  planes. Total DLP 1325 mGy*cm. Automated exposure control was utilized  for this examination as a radiation dose lowering technique.     COMPARISON: None available.        FINDINGS:  No acute hyperdense intracranial hemorrhage or abnormal  intra-axial/extra axial fluid collections are identified. No focally  hyperdense intracranial vasculature, intracranial mass effect, or  midline shift. The craniocervical junction is within normal limits. No  focal loss of gray-white differentiation or sulcal effacement to  suggest large vessel territory infarct. Age-related global cortical  involutional changes are noted, with associated expansion of the  ventricular system and subarachnoid spaces to a degree that is  commensurate with the level of sulcal prominence.     Periventricular white matter hypodensity is noted that is nonspecific,  but most likely to represent chronic microvascular white matter  ischemic changes. There is focal outpouching of the trigone of the  right lateral ventricle, of unclear etiology. The ventricular system  is otherwise normal in size and morphology. The basal cisterns are  clear. There is calcific atherosclerosis of the bilateral carotid  siphons.     The  imaged paranasal sinuses, mastoid air cells, and tympanic spaces  are clear. No acute fractures are identified the calvarium or imaged  facial bones.        IMPRESSION:  No acute intracranial abnormalities. Age-related global cortical  involutional changes and chronic microvascular white matter ischemic  changes.      .      Impression and Plan   Diagnosis   Syncope ([id number] R55)   Atrial fibrillation (STI47-CV I48.91)      Calls-Consults   -  2/23/2021 12:59:00 , Solomon MEEKS, Tobias, cards, phone call, recommends If PMD admits, they will see in consult, I spoke with Dr. Murray - recommends admitting to obs, obtain echo, start Eliquis 2.5 mg and consult [facility].    Plan   Condition: Stable.    Disposition: Admit time  2/23/2021 14:28:00, Place in Observation Telemetry Unit.    Counseled: Patient, Family, Regarding diagnosis, Regarding diagnostic results, Regarding treatment plan, Patient indicated understanding of instructions, Family understood..    Notes: I, Lam Juarez, acted solely as a scribe for and in the presence of Dr. Garcia who performed the service., I, Dr. Garcia, performed all activities above as documented and I am in full agreement with the above documentation..

## 2022-05-04 NOTE — HISTORICAL [facility] CERNER
This is a historical note converted from [employer]. Formatting and pictures may have been removed.  Please reference [employer] for original formatting and attached multimedia. Chief Complaint  presents from home via [facility]. EMS reports witnessed syncope episode by family. denies any complaints. HX of dementia.  enroute.  History of Present Illness  85 year old female who is new to our service?as of January 2021  She presented to the hospital after having a near syncopal episode at home, never lost consciousness. ?Daughter reports that her blood pressure was low?pulse was normal at 72.  Presented to the ER, volume resuscitated noted and to be in A. fib?upon presentation.  Orthostatic, on chronic Lasix  Dr Hamilton was previous Cardiologist in [place] at [facility]?now established with [facility] in [place]  Dr Calvo in [place] was previous PCP  Lived independently till 11/2019  Patient with peripheral vascular disease with evidence of chronic venous stasis and brawny edema.? Volume status appears to be?stable at current.  Recently had lower extremity vascular studies, results unknown. ?Patient with 2 venous stasis ulcers to the?right lower extremity  Review of Systems  Constitutional: No fever, no chills, no night sweats, no changes in weight, no changes in appetite.  Eye: No blurring of vision, no double vision, no conjunctival injection, no acute vision loss  ENMT:?No trauma, No sore?throat, no rhinorrhea, no tinnitus, no headache, no vertigo, no ear pain or discharge  Respiratory: No cough, no sputum production, no shortness of breath, no hemoptysis, no wheezing.  Cardiovascular: No chest pain, no ARIAS, no PND, no orthopnea, no edema, no palpitations.  Gastrointestinal: No abdominal pain, nausea, no vomiting, no changes in frequency or consistency of stools, no diarrhea, no constipation, no BRBPR.  Genitourinary: No dysuria, no hematuria, no foul-smelling urine, no straining to urinate, no increase in frequency  Heme/Lymph: No easy  bruising/ bleeding,?no swollen or painful?glands.  Endocrine: No polyuria, no polydipsia, no polyphagia, no heat or cold intolerance.  Musculoskeletal: No muscle pain, no muscle weakness, no joint pain, no difficulties on reference to range of motion.  Integumentary: No rash, no pruritis.  Neurologic: No sensory deficit, no motor deficit, no headache, no neck rigidity, no paresthesias, no syncope.  Psychiatric: no mood changes, no anxiety, no depression, no tension, no memory defecits  All Other ROS: Negative with exception of what is documented in the history of present illness  ?  Physical Exam  Vitals & Measurements  T:?36.0? ?C (Temporal Artery)? HR:?104(Peripheral)? RR:?18? BP:?122/70? BP:?181/101(Sitting)? BP:?145/89(Standing)? BP:?111/72(Supine)? SpO2:?98%? WT:?68?kg?  General : Alert and oriented, No acute distress, afebrile.  Eye : PERRLA. EOMI. Normal conjunctiva, Sclerae are nonicteric  Respiratory : Respirations are non-labored and clear to auscultation bilaterally. Symmetrical air entry bilaterally, no crackles, no wheezes, no rhonchi. No cyanosis, no clubbing.  Cardiovascular : Normal rate, Regular rhythm. No murmurs, rubs, or gallops. Pulses are 2+ throughout. No JVD.? No Edema.  Gastrointestinal : Soft, nontender, non-distended, bowel sounds are present in all quadrants, no organomegaly, no guarding, no rebound.  Musculoskeletal : Normal range of motion throughout. No muscle tenderness.  Integumentary : Warm, moist, intact.  Neurologic : Alert, Oriented, No focal neurologic deficits. No sensory deficit. No motor deficit.  Psychiatric : Cooperative, Appropriate mood & affect.  Assessment/Plan  1.?Atrial fibrillation?I48.91  2.?Syncope?R55  3.?Syncope/Near syncope?[id number]  4.?Heart murmur?R01.1  5.?PVD (peripheral vascular disease)?I73.9  Orders:  ascorbic acid, 1,000 mg, form: Tab, Oral, Daily, first dose 02/24/21 9:00:00 CST  aspirin, 81 mg, form: Tab-EC, Oral, Daily,  first dose 02/24/21 9:00:00 CST  cholecalciferol, 5,000 units, form: Tab, Oral, Daily, first dose 02/24/21 9:00:00 CST  risperiDONE, 1 mg, form: Tab, Oral, qPM, first dose 02/23/21 21:00:00 CST  Now in normal sinus rhythm  Admit to?observation  Volume resuscitated, Lasix on hold  Start Eliquis?especially in light of known?peripheral vascular disease I think this would be a good thing  CT of the head reviewed with evidence of microvascular changes but no acute findings  No acute focal deficits on exam, patient actually asking to go home  Cardiology consult  Echo ordered, telemetry monitoring  No previous MD notes available  ?  We will cont to monitor for any encephalopathic changes, hemodynamic parameters, oxygenation, supplement oxygen as needed, fecal/urinary output, electrolytes, H/H, and transfuse as needed. Images and images reports have been reviewed; labs have been reviewed  ?   Problem List/Past Medical History  Ongoing  No qualifying data  Historical  No qualifying data  Procedure/Surgical History  Debridement (eg, high pressure waterjet with/without suction, sharp selective debridement with scissors, scalpel and forceps), open wound, (eg, fibrin, devitalized epidermis and/or dermis, exudate, debris, biofilm), including topical application(s), wound (01/31/2020)  Extraction of Right Foot Skin, External Approach (01/31/2020)  Debridement (eg, high pressure waterjet with/without suction, sharp selective debridement with scissors, scalpel and forceps), open wound, (eg, fibrin, devitalized epidermis and/or dermis, exudate, debris, biofilm), including topical application(s), wound (01/24/2020)  Extraction of Right Foot Skin, External Approach (01/24/2020)  Debridement, subcutaneous tissue (includes epidermis and dermis, if performed); first 20 sq cm or less (01/17/2020)  Excision of Right Foot Subcutaneous Tissue and Fascia, Open Approach (01/17/2020)  Debridement (eg, high pressure waterjet with/without suction,  sharp selective debridement with scissors, scalpel and forceps), open wound, (eg, fibrin, devitalized epidermis and/or dermis, exudate, debris, biofilm), including topical application(s), wound (01/10/2020)  Debridement, subcutaneous tissue (includes epidermis and dermis, if performed); first 20 sq cm or less (01/10/2020)  Excision of Right Foot Subcutaneous Tissue and Fascia, Open Approach (01/10/2020)  Extraction of Right Foot Skin, External Approach (01/10/2020)  Debridement, subcutaneous tissue (includes epidermis and dermis, if performed); first 20 sq cm or less (2020)  Excision of Right Foot Subcutaneous Tissue and Fascia, Open Approach (2020)  Debridement, subcutaneous tissue (includes epidermis and dermis, if performed); first 20 sq cm or less (2019)  Excision of Right Foot Subcutaneous Tissue and Fascia, Open Approach (2019)  Debridement, subcutaneous tissue (includes epidermis and dermis, if performed); first 20 sq cm or less (2019)  Excision of Right Foot Subcutaneous Tissue and Fascia, Open Approach (2019)  Debridement, subcutaneous tissue (includes epidermis and dermis, if performed); first 20 sq cm or less (2019)  Excision of Right Foot Subcutaneous Tissue and Fascia, Open Approach (2019)   section (1973)   Medications  Inpatient  acetaminophen, 650 mg= 20.3 mL, Oral, q6hr, PRN  aspirin 81 mg oral Delayed Release (EC) tablet, 81 mg= 1 tab(s), Oral, Daily  Eliquis 2.5 mg oral tablet, 2.5 mg= 1 tab(s), Oral, BID  labetalol, 20 mg= 4 mL, IV Push, q2hr, PRN  risperidone 1 mg/mL oral solution, 1 mg= 1 mL, Oral, qPM  Toprol-XL, 12.5 mg= 0.5 tab(s), Oral, BID  Vitamin C 500 mg oral tablet, 1000 mg= 2 tab(s), Oral, Daily  Vitamin D3, 5000 IntUnit, Oral, Daily  Zofran, 4 mg= 2 mL, IV Push, q4hr, PRN  Home  aspirin 81 mg oral Delayed Release (EC) tablet, 81 mg= 1 tab(s), Oral, Daily  Bactroban 2% Ointment (22.5 gmTube), 1 slim, TOP, TID, 1  refills  furosemide 20 mg oral tablet, 20 mg= 1 tab(s), Oral, Daily  risperidone 1 mg/mL oral solution, 1 mg= 1 mL, Oral, qPM  Template Non-Formulary Med, Oral, Daily  triamcinolone 0.1% topical cream, TOP, TID  Vitamin C 1000 mg oral tablet, 1000 mg= 1 tab(s), Oral, Daily  Vitamin D3 5000 intl units (125 mcg) oral capsule, 5000 IntUnit= 1 cap(s), Oral, Daily  zinc (as gluconate) 50 mg oral tablet, 1 tab(s), Oral, Daily  Allergies  lisinopril?(Swelling)  Social History  Abuse/Neglect  No, 02/23/2021  Tobacco  Never (less than 100 in lifetime), No, 02/23/2021  Never (less than 100 in lifetime), N/A, 01/12/2021  Family History  Alzheimers disease: Father.  Cardiac arrest.: Brother.  Metastatic cancer: Mother.

## 2022-05-04 NOTE — HISTORICAL [facility]
This is a historical note converted from [place]. Formatting and pictures may have been removed.  Please reference [place] for original formatting and attached multimedia. Admit and Discharge Dates  Admit Date: 02/23/2021  Discharge Date: 02/24/2021  Physicians  Attending Physician - Sin PEPPER, Dong  Admitting Physician - Sin PEPPER, Dong  Consulting Physician - Magdiel PEPPER, Ralph COSTELLO  Consulting Physician - Rico PEPPER, Idalia DINH  Primary Care Physician - Sin PEPPER, Dong  Discharge Diagnosis  1.?Paroxysmal A-fib?I48.0  2.?Heart murmur?R01.1  3.?PVD (peripheral vascular disease)?I73.9  4.?Orthostatic hypotension?I95.1  Surgical Procedures  No procedures recorded for this visit.  Immunizations  No immunizations recorded for this visit.  Admission Information  85 year old female who is new to our service?as of January 2021  She presented to the hospital after having a near syncopal episode at home, never lost consciousness. ?Daughter reports that her blood pressure was low?pulse was normal at 72.  Presented to the ER, volume resuscitated noted and to be in A. fib?upon presentation.  Orthostatic, on chronic Lasix  Dr Hamilton was previous Cardiologist in [place] at [facility]?now established with [facility] in [place]  Dr Calvo in [place] was previous PCP  Lived independently till 11/2019  Patient with peripheral vascular disease with evidence of chronic venous stasis and brawny edema.? Volume status appears to be?stable at current.  Recently had lower extremity vascular studies, results unknown. ?Patient with 2 venous stasis ulcers to the?right lower extremity  ?  LE Arterial US 2/19/2021  ?The study quality is good.?  ??The bilateral posterior tibial, left anterior tibial, and left dorsalis pedis arteries are totally occluded.?  ?50 - 75% stenosis detected in the left popliteal artery.  ?   Echo 2/24/2021  Moderate to severely dilated left atrium.  Mildly dilated right ventricle with preserved RV function.  The left  ventricular ejection fraction is 55-60%.  E/A flow reversal noted. Suggestive of diastolic dysfunction.  Mild to moderate aortic regurgitation present.  Mild mitral regurgitation.  There is mild tricuspid regurgitation with RVSP estimated at 36 mmHg .?  Hospital Course  Patient noted to be in paroxysmal atrial fib from time of hospital presentation  She has not received any further doses of Lasix since hospital admission, patient still with some orthostatic hypotension  Does not need?be resumed on Lasix unless needed; otherwise will re-eval in the office on Monday. Otherwise volume status still remains a little on the drier side.  ?  Time Spent on discharge  > 32 minutes  Objective  Vitals & Measurements  T:?36.3? ?C (Oral)? TMIN:?36.3? ?C (Oral)? TMAX:?36.7? ?C (Oral)? HR:?78(Peripheral)? RR:?16? BP:?164/68? BP:?163/68(Sitting)? BP:?132/87(Standing)? BP:?164/68(Supine)? SpO2:?100%? WT:?70?kg? BMI:?28.4?  Physical Exam  General : Alert and oriented, No acute distress, afebrile.  Eye : PERRLA. EOMI. Normal conjunctiva, Sclerae are nonicteric.  Respiratory : Respirations are non-labored and clear to auscultation bilaterally. Symmetrical air entry bilaterally, no crackles, no wheezes, no rhonchi. No cyanosis, no clubbing.  Cardiovascular : Normal rate, Regular rhythm. No murmurs, rubs, or gallops. Pulses are 2+ throughout. No JVD.? No Edema.  Gastrointestinal : Soft, nontender, non-distended, bowel sounds are present in all quadrants, no organomegaly, no guarding, no rebound.  Musculoskeletal : Normal range of motion throughout. No muscle tenderness.  Integumentary : Warm, moist, intact.  Neurologic : Alert, Oriented,? No focal neurologic deficits. No sensory deficit. No motor deficit.  Psychiatric : Cooperative, Appropriate mood & affect.  Patient Discharge Condition  home  Discharge Disposition  Home with ; monitor volume status/ BP   Discharge Medication Reconciliation  Prescribed  apixaban (Eliquis 2.5 mg oral  tablet)?2.5 mg, Oral, BID  Continue  Template Non-Formulary Med, Oral, Daily  ascorbic acid (Vitamin C 1000 mg oral tablet)?1,000 mg, Oral, Daily  aspirin (aspirin 81 mg oral Delayed Release (EC) tablet)?81 mg, Oral, Daily  cholecalciferol (Vitamin D3 5000 intl units (125 mcg) oral capsule)?5,000 IntUnit, Oral, Daily  mupirocin topical (Bactroban 2% Ointment (22.5 gmTube))?1 slim, TOP, TID  mupirocin topical (Bactroban 2% Ointment (22.5 gmTube))?1 slim, TOP, TID  risperiDONE (risperidone 1 mg/mL oral solution)?1 mg, Oral, qPM  triamcinolone topical (triamcinolone 0.1% topical cream), TOP, TID  zinc gluconate (zinc (as gluconate) 50 mg oral tablet)?1 tab(s), Oral, Daily  Discontinue  furosemide (furosemide 20 mg oral tablet)?20 mg, Oral, Daily, PRN swelling  Education and Orders Provided  Atrial Fibrillation  Discharge - 02/24/21 16:41:00 CST, Home?  Follow up  Ralph Veloz  ????Follow up Monday  Dong Vogt, within 1 week  ????TCM with brooke  [employer]  No Qualifying Data

## 2022-07-12 ENCOUNTER — HOSPITAL ENCOUNTER (EMERGENCY)
Facility: HOSPITAL | Age: 87
Discharge: HOME | End: 2022-07-12
Attending: EMERGENCY MEDICINE
Payer: MEDICARE

## 2022-07-12 VITALS
HEIGHT: 60 IN | OXYGEN SATURATION: 98 % | HEART RATE: 82 BPM | TEMPERATURE: 98 F | WEIGHT: 154 LBS | BODY MASS INDEX: 30.23 KG/M2 | DIASTOLIC BLOOD PRESSURE: 88 MMHG | SYSTOLIC BLOOD PRESSURE: 154 MMHG | RESPIRATION RATE: 18 BRPM

## 2022-07-12 DIAGNOSIS — N30.01 ACUTE CYSTITIS WITH HEMATURIA: Primary | ICD-10-CM

## 2022-07-12 DIAGNOSIS — E86.0 DEHYDRATION: ICD-10-CM

## 2022-07-12 LAB
ALBUMIN SERPL-MCNC: 3.7 GM/DL (ref 3.4–4.8)
ALBUMIN/GLOB SERPL: 1.1 RATIO (ref 1.1–2)
ALP SERPL-CCNC: 76 UNIT/L (ref 40–150)
ALT SERPL-CCNC: 15 UNIT/L (ref 0–55)
APPEARANCE UR: CLEAR
AST SERPL-CCNC: 15 UNIT/L (ref 5–34)
BACTERIA #/AREA URNS AUTO: ABNORMAL /HPF
BASOPHILS # BLD AUTO: 0.03 X10(3)/MCL (ref 0–0.2)
BASOPHILS NFR BLD AUTO: 0.4 %
BILIRUB UR QL STRIP.AUTO: NEGATIVE MG/DL
BILIRUBIN DIRECT+TOT PNL SERPL-MCNC: 0.7 MG/DL
BUN SERPL-MCNC: 25.6 MG/DL (ref 9.8–20.1)
CALCIUM SERPL-MCNC: 9.5 MG/DL (ref 8.4–10.2)
CHLORIDE SERPL-SCNC: 109 MMOL/L (ref 98–107)
CO2 SERPL-SCNC: 23 MMOL/L (ref 23–31)
COLOR UR AUTO: YELLOW
CREAT SERPL-MCNC: 1 MG/DL (ref 0.55–1.02)
EOSINOPHIL # BLD AUTO: 0.24 X10(3)/MCL (ref 0–0.9)
EOSINOPHIL NFR BLD AUTO: 3 %
ERYTHROCYTE [DISTWIDTH] IN BLOOD BY AUTOMATED COUNT: 13.5 % (ref 11.5–17)
FLUAV AG UPPER RESP QL IA.RAPID: NOT DETECTED
FLUBV AG UPPER RESP QL IA.RAPID: NOT DETECTED
GLOBULIN SER-MCNC: 3.5 GM/DL (ref 2.4–3.5)
GLUCOSE SERPL-MCNC: 115 MG/DL (ref 82–115)
GLUCOSE UR QL STRIP.AUTO: NEGATIVE MG/DL
HCT VFR BLD AUTO: 37.5 % (ref 37–47)
HGB BLD-MCNC: 12.7 GM/DL (ref 12–16)
IMM GRANULOCYTES # BLD AUTO: 0.02 X10(3)/MCL (ref 0–0.04)
IMM GRANULOCYTES NFR BLD AUTO: 0.2 %
KETONES UR QL STRIP.AUTO: NEGATIVE MG/DL
LEUKOCYTE ESTERASE UR QL STRIP.AUTO: NEGATIVE UNIT/L
LYMPHOCYTES # BLD AUTO: 3.16 X10(3)/MCL (ref 0.6–4.6)
LYMPHOCYTES NFR BLD AUTO: 38.9 %
MCH RBC QN AUTO: 28.2 PG (ref 27–31)
MCHC RBC AUTO-ENTMCNC: 33.9 MG/DL (ref 33–36)
MCV RBC AUTO: 83.1 FL (ref 80–94)
MONOCYTES # BLD AUTO: 0.75 X10(3)/MCL (ref 0.1–1.3)
MONOCYTES NFR BLD AUTO: 9.2 %
NEUTROPHILS # BLD AUTO: 3.9 X10(3)/MCL (ref 2.1–9.2)
NEUTROPHILS NFR BLD AUTO: 48.3 %
NITRITE UR QL STRIP.AUTO: NEGATIVE
NRBC BLD AUTO-RTO: 0 %
PH UR STRIP.AUTO: 6 [PH]
PLATELET # BLD AUTO: 207 X10(3)/MCL (ref 130–400)
PMV BLD AUTO: 11.3 FL (ref 7.4–10.4)
POTASSIUM SERPL-SCNC: 4.4 MMOL/L (ref 3.5–5.1)
PROT SERPL-MCNC: 7.2 GM/DL (ref 5.8–7.6)
PROT UR QL STRIP.AUTO: ABNORMAL MG/DL
RBC # BLD AUTO: 4.51 X10(6)/MCL (ref 4.2–5.4)
RBC #/AREA URNS AUTO: ABNORMAL /HPF
RBC UR QL AUTO: ABNORMAL UNIT/L
SARS-COV-2 RNA RESP QL NAA+PROBE: NOT DETECTED
SODIUM SERPL-SCNC: 145 MMOL/L (ref 136–145)
SP GR UR STRIP.AUTO: >=1.03
SQUAMOUS #/AREA URNS AUTO: ABNORMAL /HPF
TROPONIN I SERPL-MCNC: 0.01 NG/ML (ref 0–0.04)
UROBILINOGEN UR STRIP-ACNC: 1 MG/DL
WBC # SPEC AUTO: 8.1 X10(3)/MCL (ref 4.5–11.5)
WBC #/AREA URNS AUTO: ABNORMAL /HPF
YEAST URNS QL MICRO: ABNORMAL /HPF

## 2022-07-12 PROCEDURE — 80053 COMPREHEN METABOLIC PANEL: CPT | Performed by: EMERGENCY MEDICINE

## 2022-07-12 PROCEDURE — 81001 URINALYSIS AUTO W/SCOPE: CPT | Performed by: EMERGENCY MEDICINE

## 2022-07-12 PROCEDURE — 84484 ASSAY OF TROPONIN QUANT: CPT | Performed by: EMERGENCY MEDICINE

## 2022-07-12 PROCEDURE — 25000003 PHARM REV CODE 250: Performed by: EMERGENCY MEDICINE

## 2022-07-12 PROCEDURE — 85025 COMPLETE CBC W/AUTO DIFF WBC: CPT | Performed by: EMERGENCY MEDICINE

## 2022-07-12 PROCEDURE — 99284 EMERGENCY DEPT VISIT MOD MDM: CPT | Mod: 25

## 2022-07-12 PROCEDURE — 87636 SARSCOV2 & INF A&B AMP PRB: CPT | Performed by: EMERGENCY MEDICINE

## 2022-07-12 PROCEDURE — 36415 COLL VENOUS BLD VENIPUNCTURE: CPT | Performed by: EMERGENCY MEDICINE

## 2022-07-12 RX ORDER — SODIUM CHLORIDE 9 MG/ML
1000 INJECTION, SOLUTION INTRAVENOUS
Status: COMPLETED | OUTPATIENT
Start: 2022-07-12 | End: 2022-07-12

## 2022-07-12 RX ORDER — FLUCONAZOLE 200 MG/1
200 TABLET ORAL DAILY
Qty: 14 TABLET | Refills: 0 | Status: SHIPPED | OUTPATIENT
Start: 2022-07-12 | End: 2022-07-26

## 2022-07-12 RX ADMIN — SODIUM CHLORIDE 1000 ML: 9 INJECTION, SOLUTION INTRAVENOUS at 07:07

## 2022-07-13 ENCOUNTER — TELEPHONE (OUTPATIENT)
Dept: INTERNAL MEDICINE | Facility: CLINIC | Age: 87
End: 2022-07-13
Payer: MEDICARE

## 2022-07-13 DIAGNOSIS — R53.81 PHYSICAL DECONDITIONING: Primary | ICD-10-CM

## 2022-07-14 ENCOUNTER — TELEPHONE (OUTPATIENT)
Dept: INTERNAL MEDICINE | Facility: CLINIC | Age: 87
End: 2022-07-14
Payer: MEDICARE

## 2022-07-14 NOTE — TELEPHONE ENCOUNTER
I let her know that the orders have been sent.   She stated that pt has had Vaginal bleeding, and daughter called the pharmacy to see if the medicine (Diflucan) prescribed at the ED could cause the vaginal bleeding. The pharmacist told her it could because the pt is on Eliquis and a 81 mg aspirin. Pharmacist told her to DC the Diflucan and contact our office to see if there is anything else pt can take for the yeast infection, and she would like to know if the culture came back.

## 2022-07-14 NOTE — TELEPHONE ENCOUNTER
----- Message from Delfina Knight sent at 7/14/2022 10:09 AM CDT -----  Regarding: Physical Therapy  Type:  Needs Medical Advice    Who Called: Pt's daughter Yvonne  Symptoms (please be specific):    How long has patient had these symptoms:    Pharmacy name and phone #:    Would the patient rather a call back or a response via [facility]? Call back  Best Call Back Number: [phone], Yvonne Courtney  Additional Information: Requesting orders to be sent to pt's Home Health agency. Would like a f/u

## 2022-07-14 NOTE — TELEPHONE ENCOUNTER
They can use otc monistat vaginal insert and cream  No culture was ran bc no bacteria was present only yeast which is most likely contaminate from vaginal yeast infection

## 2022-07-15 ENCOUNTER — TELEPHONE (OUTPATIENT)
Dept: INTERNAL MEDICINE | Facility: CLINIC | Age: 87
End: 2022-07-15
Payer: MEDICARE

## 2022-07-15 DIAGNOSIS — R30.0 DYSURIA: Primary | ICD-10-CM

## 2022-07-15 NOTE — TELEPHONE ENCOUNTER
Order entered, fax to [facility], ensure specimen is collected via catheterization to avoid contamination

## 2022-07-15 NOTE — TELEPHONE ENCOUNTER
----- Message from David Calvin sent at 7/15/2022 10:41 AM CDT -----  Patient daughter came into the office to see if we can order urinalysis with culture and sensitivity. Pt daughter stated she know her mother has a UTI . Please advise?

## 2022-07-16 ENCOUNTER — TELEPHONE (OUTPATIENT)
Dept: HEPATOLOGY | Facility: HOSPITAL | Age: 87
End: 2022-07-16
Payer: MEDICARE

## 2022-07-16 DIAGNOSIS — R30.0 DYSURIA: ICD-10-CM

## 2022-07-16 LAB
APPEARANCE UR: ABNORMAL
BACTERIA #/AREA URNS AUTO: ABNORMAL /HPF
BILIRUB UR QL STRIP.AUTO: ABNORMAL MG/DL
COLOR UR AUTO: ABNORMAL
GLUCOSE UR QL STRIP.AUTO: NEGATIVE MG/DL
KETONES UR QL STRIP.AUTO: NEGATIVE MG/DL
LEUKOCYTE ESTERASE UR QL STRIP.AUTO: ABNORMAL UNIT/L
NITRITE UR QL STRIP.AUTO: POSITIVE
PH UR STRIP.AUTO: 6.5 [PH]
PROT UR QL STRIP.AUTO: ABNORMAL MG/DL
RBC #/AREA URNS AUTO: >200 /HPF
RBC UR QL AUTO: ABNORMAL UNIT/L
SP GR UR STRIP.AUTO: 1.02 (ref 1–1.03)
SQUAMOUS #/AREA URNS AUTO: ABNORMAL /HPF
UROBILINOGEN UR STRIP-ACNC: 1 MG/DL
WBC #/AREA URNS AUTO: >200 /HPF

## 2022-07-16 PROCEDURE — 87077 CULTURE AEROBIC IDENTIFY: CPT | Mod: 59 | Performed by: NURSE PRACTITIONER

## 2022-07-16 PROCEDURE — 81001 URINALYSIS AUTO W/SCOPE: CPT | Performed by: NURSE PRACTITIONER

## 2022-07-16 RX ORDER — CIPROFLOXACIN 250 MG/1
250 TABLET, FILM COATED ORAL 2 TIMES DAILY
Qty: 10 TABLET | Refills: 0 | Status: SHIPPED | OUTPATIENT
Start: 2022-07-16 | End: 2022-07-21

## 2022-07-16 NOTE — TELEPHONE ENCOUNTER
Patient's daughter called yesterday and again today.  She is convinced the patient has a UTI.  She did go to the hospital emergency room on 07/12.  Workup at that time was unrevealing.  There were a few yeast in the urine and the patient was put on Diflucan but it was stopped because of interactions with other medications.  Patient's urine is getting worse.  Thicker and foul-smelling.  They would like an antibiotic called in.  Home health is there and they are getting a sample for culture and urinalysis.  I will go ahead and send in a prescription for Cipro 250 b.i.d. x5 days.

## 2022-07-18 ENCOUNTER — TELEPHONE (OUTPATIENT)
Dept: INTERNAL MEDICINE | Facility: CLINIC | Age: 87
End: 2022-07-18
Payer: MEDICARE

## 2022-07-18 NOTE — TELEPHONE ENCOUNTER
Pt with UTI and a lot of blood in urine, I see rx for antibiotic was sent in over the weekend? Has she had improvement since starting? im waiting for sensitivity report on urine to make sure she is on correct antibiotic

## 2022-07-18 NOTE — TELEPHONE ENCOUNTER
----- Message from Charan Ismael sent at 7/15/2022  3:51 PM CDT -----  Regarding: medical advice  Type:  Patient Returning Call    Who Called: home health    Who Left Message for Patient: for nurse  Does the patient know what this is regarding?: yes  Would the patient rather a call back or a response via [facility]?    Best Call Back Number: [phone]  Additional Information: when home health arrived to patient's home to collect urine, her bladder was already empty, so they couldn't get a sample from her .

## 2022-07-19 LAB
BACTERIA UR CULT: ABNORMAL
BACTERIA UR CULT: ABNORMAL

## 2022-07-26 ENCOUNTER — TELEPHONE (OUTPATIENT)
Dept: INTERNAL MEDICINE | Facility: CLINIC | Age: 87
End: 2022-07-26

## 2022-07-26 DIAGNOSIS — R30.0 DYSURIA: Primary | ICD-10-CM

## 2022-07-26 NOTE — TELEPHONE ENCOUNTER
So I think the patient needs to do a urine, if they would like to come and  a specimen cup we can submit it but we don't blindly send in Cipro.  And what is the triamcinolone cream for?

## 2022-07-26 NOTE — TELEPHONE ENCOUNTER
Pt was late for appointment today.   Daughter Mariann wanted to know if we could extend the RX for Cipro and Triamcinilone cream.   She stated that she believes the UTI is not fully gone.   Yvonne callback #: [phone]

## 2022-07-27 RX ORDER — TRIAMCINOLONE ACETONIDE 1 MG/G
CREAM TOPICAL 2 TIMES DAILY PRN
Qty: 15 G | Refills: 1 | Status: SHIPPED | OUTPATIENT
Start: 2022-07-27 | End: 2022-08-02 | Stop reason: SDUPTHER

## 2022-07-27 NOTE — TELEPHONE ENCOUNTER
----- Message from Delfina Knight sent at 7/27/2022 11:13 AM CDT -----  Regarding: Call back  .Type:  Needs Medical Advice    Who Called: [facility]  Symptoms (please be specific):   How long has patient had these symptoms:    Pharmacy name and phone #:    Would the patient rather a call back or a response via [facility]? Call back  Best Call Back Number: [phone]  Additional Information: Has the culture sensitivity report and did fax it over to clinic, wants nurse to f/u to see if another urine sample needs to be collected on pt.

## 2022-07-29 ENCOUNTER — TELEPHONE (OUTPATIENT)
Dept: ADMINISTRATIVE | Facility: HOSPITAL | Age: 87
End: 2022-07-29
Payer: MEDICARE

## 2022-07-29 NOTE — TELEPHONE ENCOUNTER
----- Message from Steph Schuelr sent at 7/29/2022 10:44 AM CDT -----  Regarding: Home Health care [facility]  Type:  Needs Medical Advice    Who Called: [facility]  Would the patient rather a call back or a response via [facility]? C/b  Best Call Back Number: [phone]  Additional Information: [facility] s/w nurse on Wed 7/27 regarding a urine sample. Faxed over sensitivity report.  Did you receive report do you need another test to be done? Fax # [phone]

## 2022-07-29 NOTE — TELEPHONE ENCOUNTER
Order re-faxed for UA for HH to collect. They assumed the old UA was what we needed. I let them know that we need another UA due to pt still having symptoms

## 2022-07-30 ENCOUNTER — LAB REQUISITION (OUTPATIENT)
Dept: LAB | Facility: HOSPITAL | Age: 87
End: 2022-07-30
Payer: MEDICARE

## 2022-07-30 DIAGNOSIS — R30.0 DYSURIA: ICD-10-CM

## 2022-07-30 LAB
AMORPH URATE CRY URNS QL MICRO: ABNORMAL /HPF
APPEARANCE UR: ABNORMAL
BACTERIA #/AREA URNS AUTO: ABNORMAL /HPF
BILIRUB UR QL STRIP.AUTO: NEGATIVE MG/DL
CAOX CRY URNS QL MICRO: ABNORMAL /HPF
COLOR UR AUTO: YELLOW
GLUCOSE UR QL STRIP.AUTO: NEGATIVE MG/DL
KETONES UR QL STRIP.AUTO: NEGATIVE MG/DL
LEUKOCYTE ESTERASE UR QL STRIP.AUTO: NEGATIVE UNIT/L
NITRITE UR QL STRIP.AUTO: NEGATIVE
PH UR STRIP.AUTO: 6 [PH]
PROT UR QL STRIP.AUTO: NEGATIVE MG/DL
RBC #/AREA URNS AUTO: ABNORMAL /HPF
RBC UR QL AUTO: NEGATIVE UNIT/L
SP GR UR STRIP.AUTO: >=1.03
SQUAMOUS #/AREA URNS AUTO: ABNORMAL /HPF
UROBILINOGEN UR STRIP-ACNC: 0.2 MG/DL
WBC #/AREA URNS AUTO: ABNORMAL /HPF

## 2022-07-30 PROCEDURE — 81001 URINALYSIS AUTO W/SCOPE: CPT | Performed by: INTERNAL MEDICINE

## 2022-08-02 ENCOUNTER — OFFICE VISIT (OUTPATIENT)
Dept: INTERNAL MEDICINE | Facility: CLINIC | Age: 87
End: 2022-08-02
Payer: MEDICARE

## 2022-08-02 DIAGNOSIS — G30.9 ALZHEIMER'S DEMENTIA WITHOUT BEHAVIORAL DISTURBANCE, UNSPECIFIED TIMING OF DEMENTIA ONSET: Primary | ICD-10-CM

## 2022-08-02 DIAGNOSIS — F02.80 ALZHEIMER'S DEMENTIA WITHOUT BEHAVIORAL DISTURBANCE, UNSPECIFIED TIMING OF DEMENTIA ONSET: Primary | ICD-10-CM

## 2022-08-02 DIAGNOSIS — R53.81 PHYSICAL DECONDITIONING: ICD-10-CM

## 2022-08-02 PROCEDURE — 99214 PR OFFICE/OUTPT VISIT, EST, LEVL IV, 30-39 MIN: ICD-10-PCS | Mod: 95,,, | Performed by: INTERNAL MEDICINE

## 2022-08-02 PROCEDURE — 99214 OFFICE O/P EST MOD 30 MIN: CPT | Mod: 95,,, | Performed by: INTERNAL MEDICINE

## 2022-08-02 RX ORDER — TRIAMCINOLONE ACETONIDE 1 MG/G
CREAM TOPICAL 2 TIMES DAILY PRN
Qty: 80 G | Refills: 1 | Status: SHIPPED | OUTPATIENT
Start: 2022-08-02 | End: 2022-01-01

## 2022-08-02 NOTE — PROGRESS NOTES
Subjective:      Patient ID: Ally Valdez is a 86 y.o. female.    Chief Complaint: Follow-up (Labs)      This is a telemedicine note. Patient was treated using telemedicine, real time audio and video, according to [facility] protocols. I, distant provider, conducted the visit from location identified below. The patient participated in the visit at a non-[facility] location selected by the patient (or patients representative), identified below. I am licensed in the state where the patient stated they are located. The patient (or patients representative) stated that they understood and accepted the privacy and security risks to their information at their location.   Patient was located at Home.     I, distant provider, was located at [facility].    HPI:  86 year old female  Daughter present for telemed  History includes a fib on eliquis, PVD  Dr. Quiroz- podiatrist, history ulceration on left toe    Lived independently till 11/2019  Patient with peripheral vascular disease with evidence of chronic venous stasis and brawny edema.   Dr. Veloz- cardiology- a. fib,pvd, murmur. on eliquis  3/24 PTA with stent right leg  LE Arterial US 2/19/2021-The bilateral posterior tibial, left anterior tibial, and left dorsalis pedis arteries are totally occluded. 50 - 75% stenosis detected in the left popliteal artery.  Echo 2/24/2021- severely dialted atrium, EF 55-60%; diastolic dysfunction         7/2022 went to the ED, full work up.   Dr Warner sent in Cipro for UTI the following weekend  UA was repeated on 7-30-22 which was clear  Patient overall improved, but still rather listless and sleepy  Woke up at 9am; its 1130 and she is asleep for the visit  [facility]; has PT/ OT once a week; needs increased frequency. Overall improving.         Problem List Items Addressed This Visit        Other    Physical deconditioning    Relevant Orders    Ambulatory referral/consult to Home Health      Other Visit  Diagnoses     Alzheimer's dementia without behavioral disturbance, unspecified timing of dementia onset    -  Primary    Relevant Orders    Ambulatory referral/consult to Home Health              Past Medical History:  Past Medical History:   Diagnosis Date    A-fib     Dementia     Foot ulcer, left     Hypertension     Paroxysmal atrial fibrillation     PVD (peripheral vascular disease)      Past Surgical History:   Procedure Laterality Date     SECTION  1973    Stent placement right thigh  2021     Review of patient's allergies indicates:   Allergen Reactions    Lisinopril Swelling     Current Outpatient Medications on File Prior to Visit   Medication Sig Dispense Refill    diclofenac sodium (VOLTAREN) 1 % Gel APPLY 1 APPLICATION TOPICALLY 4 TIMES A DAY AS DIRECTED FOR 30 DAYS      ELIQUIS 2.5 mg Tab Take 2.5 mg by mouth 2 (two) times daily.      lactulose (CHRONULAC) 10 gram/15 mL solution Take 15 mLs by mouth 3 (three) times daily.      [DISCONTINUED] triamcinolone acetonide 0.1% (KENALOG) 0.1 % cream Apply topically 2 (two) times daily as needed (eczema). 15 g 1     No current facility-administered medications on file prior to visit.     Social History     Socioeconomic History    Marital status:    Tobacco Use    Smoking status: Never Smoker    Smokeless tobacco: Never Used   Substance and Sexual Activity    Alcohol use: Never    Drug use: Never    Sexual activity: Not Currently     Partners: Male     Birth control/protection: None     Family History   Problem Relation Age of Onset    Cancer Mother     Alzheimer's disease Father            Review of Systems  Constitutional: No fever,  no fatigue, no chills, no night sweats, no weight gain, no weight loss, no changes in appetite.   Eye: No redness, no acute vision loss, no blurred vision, no double vision, no eye pain  ENMT: No sore throat, no nasal drainage, no nose bleeds,  no headache, no ear pain, no ear drainage,  no acute hearing loss  Respiratory: No cough, no sputum production, no shortness of breath, no hemoptysis, no wheezing.  Cardiovascular: No chest pain, no chest tightness, no ARIAS, no PND, no orthopnea, no swelling, no palpitations.  Gastrointestinal: No abdominal pain, no nausea, no vomiting, no diarrhea, no constipation, no difficulty swallowing, no change in bowel habits, no rectal bleeding  Genitourinary: no urgency, no frequency, no burning or pain when urinating, no blood in urine, no incontinence  Heme/Lymph: No easy bruising and/or bleeding, no swollen or painful glands.  Endocrine: No polyuria, no polydipsia, no polyphagia, no heat or cold intolerance.  Musculoskeletal: No muscle pain, no muscle weakness, no joint pain, no red or swollen joints.  Integumentary: No rash, no pruritis, no hair or nail changes.  Neurologic: No dizziness, no fainting, no tremors, no tingling and/ or numbness.  Psychiatric: No anxiety, no depression, no memory loss  All Other ROS: Negative with exception of what is documented in the history of present illness     Objective:   There were no vitals taken for this visit.    Physical Exam  General :No acute distress, well, developed, well nourished, afebrile   Neurologic : Alert and  Sleepy  Psychiatric : Cooperative    Assessment:     1. Alzheimer's dementia without behavioral disturbance, unspecified timing of dementia onset    2. Physical deconditioning        Plan:       I am having Ally Valdez maintain her ELIQUIS, diclofenac sodium, lactulose, and triamcinolone acetonide 0.1%.      Problem List Items Addressed This Visit        Other    Physical deconditioning    Relevant Orders    Ambulatory referral/consult to Home Health      Other Visit Diagnoses     Alzheimer's dementia without behavioral disturbance, unspecified timing of dementia onset    -  Primary    Relevant Orders    Ambulatory referral/consult to Home Health            Ally was seen today for  follow-up.    Diagnoses and all orders for this visit:    Alzheimer's dementia without behavioral disturbance, unspecified timing of dementia onset  -     Ambulatory referral/consult to Home Health; Future    Physical deconditioning  -     Ambulatory referral/consult to Home Health; Future    Other orders  -     triamcinolone acetonide 0.1% (KENALOG) 0.1 % cream; Apply topically 2 (two) times daily as needed (eczema).            Medications Ordered This Encounter   Medications    triamcinolone acetonide 0.1% (KENALOG) 0.1 % cream     Sig: Apply topically 2 (two) times daily as needed (eczema).     Dispense:  80 g     Refill:  1     [unfilled]  Orders Placed This Encounter   Procedures    Ambulatory referral/consult to Home Health     Standing Status:   Future     Standing Expiration Date:   9/2/2023     Referral Priority:   Routine     Referral Type:   Home Health     Referral Reason:   Specialty Services Required     Requested Specialty:   Home Health Services     Number of Visits Requested:   1       Medication List with Changes/Refills   Current Medications    DICLOFENAC SODIUM (VOLTAREN) 1 % GEL    APPLY 1 APPLICATION TOPICALLY 4 TIMES A DAY AS DIRECTED FOR 30 DAYS    ELIQUIS 2.5 MG TAB    Take 2.5 mg by mouth 2 (two) times daily.    LACTULOSE (CHRONULAC) 10 GRAM/15 ML SOLUTION    Take 15 mLs by mouth 3 (three) times daily.   Changed and/or Refilled Medications    Modified Medication Previous Medication    TRIAMCINOLONE ACETONIDE 0.1% (KENALOG) 0.1 % CREAM triamcinolone acetonide 0.1% (KENALOG) 0.1 % cream       Apply topically 2 (two) times daily as needed (eczema).    Apply topically 2 (two) times daily as needed (eczema).      Medication List with Changes/Refills   Current Medications    DICLOFENAC SODIUM (VOLTAREN) 1 % GEL    APPLY 1 APPLICATION TOPICALLY 4 TIMES A DAY AS DIRECTED FOR 30 DAYS       Start Date: 7/11/2022 End Date: --    ELIQUIS 2.5 MG TAB    Take 2.5 mg by mouth 2 (two) times daily.        Start Date: 6/3/2022  End Date: --    LACTULOSE (CHRONULAC) 10 GRAM/15 ML SOLUTION    Take 15 mLs by mouth 3 (three) times daily.       Start Date: 6/22/2022 End Date: --   Changed and/or Refilled Medications    Modified Medication Previous Medication    TRIAMCINOLONE ACETONIDE 0.1% (KENALOG) 0.1 % CREAM triamcinolone acetonide 0.1% (KENALOG) 0.1 % cream       Apply topically 2 (two) times daily as needed (eczema).    Apply topically 2 (two) times daily as needed (eczema).       Start Date: 8/2/2022  End Date: --    Start Date: 7/27/2022 End Date: 8/2/2022            Follow up in about 6 months (around 2/2/2023) for WELLNESS.    Answers for HPI/ROS submitted by the patient on 8/2/2022  Onset: gradual onset  Frequency: intermittently  Progression since onset: resolved  Pain quality: aching  Fever: no fever  Sexually active?: No  History of pyelonephritis?: No  discharge: No  hesitancy: No  possible pregnancy: No  sweats: No  weight loss: No  withholding: No  behavior changes: No  catheterization: No  diabetes insipidus: No  diabetes mellitus: No  genitourinary reflux: No  hypertension: No  recurrent UTIs: No  single kidney: No  STD: No  urinary stasis: No  urological procedure: No  kidney stones: No    Face to face time spent with patient exceeds 12 minutes, over 50% of which was used for education and counseling regarding medical conditions, current medications including risk/benefit and side effects/adverse events, over the counter medications-uses/doses, home self-care and contact precautions, and red flags and indications for immediate medical attention. The patient is receptive, expresses understanding and is agreeable to plan. All questions answered

## 2022-08-17 ENCOUNTER — TELEPHONE (OUTPATIENT)
Dept: INTERNAL MEDICINE | Facility: CLINIC | Age: 87
End: 2022-08-17
Payer: MEDICARE

## 2022-08-17 NOTE — TELEPHONE ENCOUNTER
----- Message from David Calvin sent at 8/17/2022  1:09 PM CDT -----  Pt daughter stated the home health[facility]) need another order to extended PT/OT

## 2022-08-18 ENCOUNTER — TELEPHONE (OUTPATIENT)
Dept: ADMINISTRATIVE | Facility: HOSPITAL | Age: 87
End: 2022-08-18
Payer: MEDICARE

## 2022-08-18 NOTE — TELEPHONE ENCOUNTER
Spoke with daughter Yvonne, stated yesterday mother was taken to ED at [facility] due to confusion and lethargy. They did labs and UA and suggested patient be seen by Neurologist. Daughter would like Dr Murray to review labs and suggested a Neurologist.

## 2022-08-18 NOTE — TELEPHONE ENCOUNTER
Spoke with daughter Yvonne, informed her of Dr Murray's review of hospital ER visit, daughter wanted a physical visit instead of Telemed.

## 2022-08-18 NOTE — TELEPHONE ENCOUNTER
Type:  Needs Medical Advice    Who Called: daughter  Symptoms (please be specific): na   How long has patient had these symptoms:  na  Pharmacy name and phone #:  na  Would the patient rather a call back or a response via [facility]? Call back  Best Call Back Number: [phone]  Additional Information: pt daughter stated that she went to the er yesterday and they told her that the pt would need to see a neurologist. Pt daughter wants medical advice

## 2022-08-18 NOTE — TELEPHONE ENCOUNTER
I have reviewed the records and there was nothing remarkable with the exception of her being dehydrated based on her labs; her CT does not show me anything that I didn't expect to see. Did he say why he thought she needed to see a neurologist? Not sure what he could possibly offer. They can schedule a telemed with me if they would like to discuss further

## 2022-08-21 ENCOUNTER — HOSPITAL ENCOUNTER (EMERGENCY)
Facility: HOSPITAL | Age: 87
Discharge: HOME | End: 2022-08-21
Attending: STUDENT IN AN ORGANIZED HEALTH CARE EDUCATION/TRAINING PROGRAM
Payer: MEDICARE

## 2022-08-21 VITALS
TEMPERATURE: 98 F | RESPIRATION RATE: 18 BRPM | HEART RATE: 78 BPM | OXYGEN SATURATION: 98 % | SYSTOLIC BLOOD PRESSURE: 152 MMHG | DIASTOLIC BLOOD PRESSURE: 85 MMHG

## 2022-08-21 DIAGNOSIS — R51.9 NONINTRACTABLE HEADACHE, UNSPECIFIED CHRONICITY PATTERN, UNSPECIFIED HEADACHE TYPE: Primary | ICD-10-CM

## 2022-08-21 LAB
ALBUMIN SERPL-MCNC: 3.5 GM/DL (ref 3.4–4.8)
ALBUMIN/GLOB SERPL: 1.1 RATIO (ref 1.1–2)
ALP SERPL-CCNC: 83 UNIT/L (ref 40–150)
ALT SERPL-CCNC: 15 UNIT/L (ref 0–55)
APPEARANCE UR: CLEAR
APTT PPP: 42.6 SECONDS (ref 23.2–33.7)
AST SERPL-CCNC: 14 UNIT/L (ref 5–34)
BACTERIA #/AREA URNS AUTO: ABNORMAL /HPF
BASOPHILS # BLD AUTO: 0.04 X10(3)/MCL (ref 0–0.2)
BASOPHILS NFR BLD AUTO: 0.5 %
BILIRUB UR QL STRIP.AUTO: NEGATIVE MG/DL
BILIRUBIN DIRECT+TOT PNL SERPL-MCNC: 0.4 MG/DL
BUN SERPL-MCNC: 25.9 MG/DL (ref 9.8–20.1)
CALCIUM SERPL-MCNC: 9.3 MG/DL (ref 8.4–10.2)
CHLORIDE SERPL-SCNC: 106 MMOL/L (ref 98–107)
CO2 SERPL-SCNC: 24 MMOL/L (ref 23–31)
COLOR UR AUTO: YELLOW
CREAT SERPL-MCNC: 0.98 MG/DL (ref 0.55–1.02)
EOSINOPHIL # BLD AUTO: 0.12 X10(3)/MCL (ref 0–0.9)
EOSINOPHIL NFR BLD AUTO: 1.4 %
ERYTHROCYTE [DISTWIDTH] IN BLOOD BY AUTOMATED COUNT: 14.1 % (ref 11.5–17)
FLUAV AG UPPER RESP QL IA.RAPID: NOT DETECTED
FLUBV AG UPPER RESP QL IA.RAPID: NOT DETECTED
GFR SERPLBLD CREATININE-BSD FMLA CKD-EPI: 56 MLS/MIN/1.73/M2
GLOBULIN SER-MCNC: 3.3 GM/DL (ref 2.4–3.5)
GLUCOSE SERPL-MCNC: 128 MG/DL (ref 82–115)
GLUCOSE UR QL STRIP.AUTO: NEGATIVE MG/DL
HCT VFR BLD AUTO: 30.9 % (ref 37–47)
HGB BLD-MCNC: 10.2 GM/DL (ref 12–16)
IMM GRANULOCYTES # BLD AUTO: 0.03 X10(3)/MCL (ref 0–0.04)
IMM GRANULOCYTES NFR BLD AUTO: 0.4 %
INR BLD: 1.24 (ref 0–1.3)
KETONES UR QL STRIP.AUTO: ABNORMAL MG/DL
LEUKOCYTE ESTERASE UR QL STRIP.AUTO: NEGATIVE UNIT/L
LYMPHOCYTES # BLD AUTO: 1.81 X10(3)/MCL (ref 0.6–4.6)
LYMPHOCYTES NFR BLD AUTO: 21.4 %
MCH RBC QN AUTO: 28.1 PG (ref 27–31)
MCHC RBC AUTO-ENTMCNC: 33 MG/DL (ref 33–36)
MCV RBC AUTO: 85.1 FL (ref 80–94)
MONOCYTES # BLD AUTO: 0.76 X10(3)/MCL (ref 0.1–1.3)
MONOCYTES NFR BLD AUTO: 9 %
NEUTROPHILS # BLD AUTO: 5.7 X10(3)/MCL (ref 2.1–9.2)
NEUTROPHILS NFR BLD AUTO: 67.3 %
NITRITE UR QL STRIP.AUTO: NEGATIVE
NRBC BLD AUTO-RTO: 0 %
PH UR STRIP.AUTO: 5.5 [PH]
PLATELET # BLD AUTO: 197 X10(3)/MCL (ref 130–400)
PMV BLD AUTO: 10.5 FL (ref 7.4–10.4)
POTASSIUM SERPL-SCNC: 4.2 MMOL/L (ref 3.5–5.1)
PROT SERPL-MCNC: 6.8 GM/DL (ref 5.8–7.6)
PROT UR QL STRIP.AUTO: ABNORMAL MG/DL
PROTHROMBIN TIME: 15.5 SECONDS (ref 12.5–14.5)
RBC # BLD AUTO: 3.63 X10(6)/MCL (ref 4.2–5.4)
RBC #/AREA URNS AUTO: 6 /HPF
RBC UR QL AUTO: ABNORMAL UNIT/L
SARS-COV-2 RNA RESP QL NAA+PROBE: NOT DETECTED
SODIUM SERPL-SCNC: 139 MMOL/L (ref 136–145)
SP GR UR STRIP.AUTO: 1.02 (ref 1–1.03)
SQUAMOUS #/AREA URNS AUTO: <5 /HPF
UROBILINOGEN UR STRIP-ACNC: 0.2 MG/DL
WBC # SPEC AUTO: 8.5 X10(3)/MCL (ref 4.5–11.5)
WBC #/AREA URNS AUTO: <5 /HPF

## 2022-08-21 PROCEDURE — 85730 THROMBOPLASTIN TIME PARTIAL: CPT | Performed by: STUDENT IN AN ORGANIZED HEALTH CARE EDUCATION/TRAINING PROGRAM

## 2022-08-21 PROCEDURE — 85025 COMPLETE CBC W/AUTO DIFF WBC: CPT | Performed by: STUDENT IN AN ORGANIZED HEALTH CARE EDUCATION/TRAINING PROGRAM

## 2022-08-21 PROCEDURE — 85610 PROTHROMBIN TIME: CPT | Performed by: STUDENT IN AN ORGANIZED HEALTH CARE EDUCATION/TRAINING PROGRAM

## 2022-08-21 PROCEDURE — 81001 URINALYSIS AUTO W/SCOPE: CPT | Performed by: STUDENT IN AN ORGANIZED HEALTH CARE EDUCATION/TRAINING PROGRAM

## 2022-08-21 PROCEDURE — 99284 EMERGENCY DEPT VISIT MOD MDM: CPT | Mod: 25

## 2022-08-21 PROCEDURE — 80053 COMPREHEN METABOLIC PANEL: CPT | Performed by: STUDENT IN AN ORGANIZED HEALTH CARE EDUCATION/TRAINING PROGRAM

## 2022-08-21 PROCEDURE — 87636 SARSCOV2 & INF A&B AMP PRB: CPT | Performed by: STUDENT IN AN ORGANIZED HEALTH CARE EDUCATION/TRAINING PROGRAM

## 2022-08-21 PROCEDURE — 36415 COLL VENOUS BLD VENIPUNCTURE: CPT | Performed by: STUDENT IN AN ORGANIZED HEALTH CARE EDUCATION/TRAINING PROGRAM

## 2022-08-21 RX ORDER — ACETAMINOPHEN 500 MG
1000 TABLET ORAL
Status: DISCONTINUED | OUTPATIENT
Start: 2022-08-21 | End: 2022-08-21 | Stop reason: HOSPADM

## 2022-08-24 ENCOUNTER — OUTPATIENT CASE MANAGEMENT (OUTPATIENT)
Dept: ADMINISTRATIVE | Facility: OTHER | Age: 87
End: 2022-08-24
Payer: MEDICARE

## 2022-08-24 ENCOUNTER — OFFICE VISIT (OUTPATIENT)
Dept: INTERNAL MEDICINE | Facility: CLINIC | Age: 87
End: 2022-08-24
Payer: MEDICARE

## 2022-08-24 VITALS
DIASTOLIC BLOOD PRESSURE: 66 MMHG | OXYGEN SATURATION: 98 % | RESPIRATION RATE: 16 BRPM | WEIGHT: 150 LBS | TEMPERATURE: 97 F | HEIGHT: 64 IN | SYSTOLIC BLOOD PRESSURE: 120 MMHG | BODY MASS INDEX: 25.61 KG/M2 | HEART RATE: 59 BPM

## 2022-08-24 DIAGNOSIS — N18.31 CHRONIC KIDNEY DISEASE, STAGE 3A: ICD-10-CM

## 2022-08-24 DIAGNOSIS — G30.1 LATE ONSET ALZHEIMER'S DEMENTIA WITHOUT BEHAVIORAL DISTURBANCE: Primary | ICD-10-CM

## 2022-08-24 DIAGNOSIS — F02.80 LATE ONSET ALZHEIMER'S DEMENTIA WITHOUT BEHAVIORAL DISTURBANCE: Primary | ICD-10-CM

## 2022-08-24 DIAGNOSIS — E11.59 TYPE 2 DIABETES MELLITUS WITH OTHER CIRCULATORY COMPLICATIONS: ICD-10-CM

## 2022-08-24 DIAGNOSIS — F01.518 VASCULAR DEMENTIA WITH BEHAVIORAL DISTURBANCE: ICD-10-CM

## 2022-08-24 PROCEDURE — 99214 OFFICE O/P EST MOD 30 MIN: CPT | Mod: ,,, | Performed by: INTERNAL MEDICINE

## 2022-08-24 PROCEDURE — 99214 PR OFFICE/OUTPT VISIT, EST, LEVL IV, 30-39 MIN: ICD-10-PCS | Mod: ,,, | Performed by: INTERNAL MEDICINE

## 2022-08-24 RX ORDER — IBUPROFEN 100 MG/5ML
1000 SUSPENSION, ORAL (FINAL DOSE FORM) ORAL DAILY
Status: ON HOLD | COMMUNITY
End: 2023-01-01 | Stop reason: HOSPADM

## 2022-08-24 RX ORDER — NYSTATIN 100000 [USP'U]/G
POWDER TOPICAL 2 TIMES DAILY
COMMUNITY
Start: 2022-08-04 | End: 2022-09-20 | Stop reason: SDUPTHER

## 2022-08-24 RX ORDER — ACETAMINOPHEN 500 MG
5000 TABLET ORAL DAILY
COMMUNITY
End: 2023-01-01

## 2022-08-24 RX ORDER — ASPIRIN 81 MG/1
81 TABLET ORAL
COMMUNITY

## 2022-08-24 NOTE — ASSESSMENT & PLAN NOTE
Referral to neurology service for further recommendations.  Not sure what else her is to possibly add.  PT and OT referral sent as well.  Will see her back in the next several months, sooner if needed  Referral to chronic disease management given the frequency of her ER visits

## 2022-08-24 NOTE — LETTER
August 30, 2022             Dear Ms. Vadlez,    Welcome to [facility] Complex Care Management [facility].  It was a pleasure talking with you today.  My name is Kristal John, and I look forward to being your Care Manager.  My goal is to help you function at the healthiest and highest level possible.  You can contact me directly at [phone].    As an [facility] patient, some of the services we may be able to provide include:      Development of an individualized care plan with a Registered Nurse    Connection with available resources and services     Coordinate communication among your care team members    Provide coaching and education    Help you understand your doctors treatment plan     All services provided by [facility] Complex Care Managers and other care team members are coordinated with and communicated to your primary care team.      As part of your enrollment, you will be receiving education materials and more information about these services in your [facility] account, by phone or through the mail.  If you do not wish to participate or receive information, please contact our office at [phone].      Sincerely,        Kristal Lopez RN  [facility]   Out-patient RN Complex Care Manager

## 2022-08-24 NOTE — PROGRESS NOTES
Subjective:      Patient ID: Ally Valdez is a 87 y.o. female.    Chief Complaint: Follow-up (ED f/u)      HPI:    87 year old female  Daughter present   History includes a fib on eliquis, PVD  Dr. Quiroz- podiatrist, history ulceration on left toe     Lived independently till 11/2019  Patient with peripheral vascular disease with evidence of chronic venous stasis and brawny edema.   Dr. Veloz- cardiology- a. fib,pvd, murmur. on eliquis  3/24 PTA with stent right leg  LE Arterial US 2/19/2021-The bilateral posterior tibial, left anterior tibial, and left dorsalis pedis arteries are totally occluded. 50 - 75% stenosis detected in the left popliteal artery.  Echo 2/24/2021- severely dialted atrium, EF 55-60%; diastolic dysfunction           7/2022 went to the ED, full work up.   Dr Warner sent in [facility] for UTI the following weekend  UA was repeated on 7-30-22 which was clear  Patient overall improved, but still rather listless and sleepy  Woke up at 9am; its 1130 and she is asleep for the visit  Home health 2000; has PT/ OT once a week; needs increased frequency.  Patient went to the ER again on 08/17 and again on 08/21.  No remarkable findings, CT of the head normal, blood work, urinalysis all normal as well.  ER doctor wanting referral to Neurology?  Referral placed.  Family also again requesting increase in frequency of PT and OT visits.  During the course of the visit patient keeps asking why are we here?      Problem List Items Addressed This Visit        Neuro    Vascular dementia with behavioral disturbance    Current Assessment & Plan     Referral to neurology service for further recommendations.  Not sure what else her is to possibly add.  PT and OT referral sent as well.  Will see her back in the next several months, sooner if needed  Referral to chronic disease management given the frequency of her ER visits           Relevant Orders    Ambulatory referral/consult to Neurology    Ambulatory  referral/consult to Outpatient Case Management       Renal/    Chronic kidney disease, stage 3a       Endocrine    Type 2 diabetes mellitus with other circulatory complications      Other Visit Diagnoses     Late onset Alzheimer's dementia without behavioral disturbance    -  Primary    Relevant Orders    SUBSEQUENT HOME HEALTH ORDERS    Ambulatory referral/consult to Neurology    Ambulatory referral/consult to Outpatient Case Management              Past Medical History:  Past Medical History:   Diagnosis Date    A-fib     Dementia     Foot ulcer, left     Hypertension     Paroxysmal atrial fibrillation     PVD (peripheral vascular disease)      Past Surgical History:   Procedure Laterality Date     SECTION  1973    Stent placement right thigh  2021     Review of patient's allergies indicates:   Allergen Reactions    Lisinopril Swelling    Fluconazole      Current Outpatient Medications on File Prior to Visit   Medication Sig Dispense Refill    ascorbic acid, vitamin C, (VITAMIN C) 1000 MG tablet Take 1,000 mg by mouth once daily.      aspirin (ECOTRIN) 81 MG EC tablet Take 81 mg by mouth 3 (three) times a week.      cholecalciferol, vitamin D3, 125 mcg (5,000 unit) Tab Take 5,000 Units by mouth once daily.      diclofenac sodium (VOLTAREN) 1 % Gel APPLY 1 APPLICATION TOPICALLY 4 TIMES A DAY AS DIRECTED FOR 30 DAYS      ELIQUIS 2.5 mg Tab Take 2.5 mg by mouth 2 (two) times daily.      lactulose (CHRONULAC) 10 gram/15 mL solution Take 15 mLs by mouth 3 (three) times daily.      NYSTOP powder 2 (two) times daily. Apply to affected area      triamcinolone acetonide 0.1% (KENALOG) 0.1 % cream Apply topically 2 (two) times daily as needed (eczema). 80 g 1     No current facility-administered medications on file prior to visit.     Social History     Socioeconomic History    Marital status:    Tobacco Use    Smoking status: Never Smoker    Smokeless tobacco: Never Used  "  Substance and Sexual Activity    Alcohol use: Never    Drug use: Never    Sexual activity: Not Currently     Partners: Male     Birth control/protection: None     Family History   Problem Relation Age of Onset    Cancer Mother     Alzheimer's disease Father            Review of Systems  Constitutional: No fever,  no fatigue, no chills, no night sweats, no weight gain, no weight loss, no changes in appetite.   Eye: No redness, no acute vision loss, no blurred vision, no double vision, no eye pain  ENMT: No sore throat, no nasal drainage, no nose bleeds,  no headache, no ear pain, no ear drainage, no acute hearing loss  Respiratory: No cough, no sputum production, no shortness of breath, no hemoptysis, no wheezing.  Cardiovascular: No chest pain, no chest tightness, no ARIAS, no PND, no orthopnea, no swelling, no palpitations.  Gastrointestinal: No abdominal pain, no nausea, no vomiting, no diarrhea, no constipation, no difficulty swallowing, no change in bowel habits, no rectal bleeding  Genitourinary: no urgency, no frequency, no burning or pain when urinating, no blood in urine, no incontinence  Heme/Lymph: No easy bruising and/or bleeding, no swollen or painful glands.  Endocrine: No polyuria, no polydipsia, no polyphagia, no heat or cold intolerance.  Musculoskeletal: No muscle pain, no muscle weakness, no joint pain, no red or swollen joints.  Integumentary: No rash, no pruritis, no hair or nail changes.  Neurologic: No dizziness, no fainting, no tremors, no tingling and/ or numbness.  Psychiatric: No anxiety, no depression, no memory loss  All Other ROS: Negative with exception of what is documented in the history of present illness     Objective:   /66 (BP Location: Left arm, Patient Position: Sitting, BP Method: Medium (Manual))   Pulse (!) 59   Temp 97 °F (36.1 °C) (Temporal)   Resp 16   Ht 5' 4" (1.626 m)   Wt 68 kg (150 lb)   SpO2 98%   BMI 25.75 kg/m²     Physical Exam  General : Alert " and oriented, No acute distress, well, developed, well nourished, afebrile, elderly female in a wheel chair   Eye : PERRLA. EOMI. Normal conjunctiva without injection. Sclerae are nonicteric. No pallor.  HEENT : Normocephalic. Neck supple. Normal hearing. Oral mucosa is moist.  Respiratory : Lungs are clear to auscultation bilaterally, non-labored. Symmetrical chest wall expansion. No crackles, wheeze, or rhonci.  Cardiovascular : Normal rate, Regular rhythm. 2/6 JARED  Gastrointestinal : Soft, nontender, non-distended, bowel sounds normal, no organomegaly, no guarding, no rebound.  Musculoskeletal : Normal ROM.  No muscle tenderness.  Integumentary : Warm to touch. Intact. No rash.    Neurologic : Alert and oriented. No focal deficits.   Psychiatric : Cooperative, Appropriate mood & affect. Normal judgment.          Assessment:     1. 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 Type 2 diabetes mellitus with other circulatory complications    3. Vascular dementia with behavioral disturbance    4. Chronic kidney disease, stage 3a                  Plan:       I am having Ally KENIA Courtney maintain her ELIQUIS, diclofenac sodium, lactulose, triamcinolone acetonide 0.1%, NYSTOP, aspirin, ascorbic acid (vitamin C), and cholecalciferol (vitamin D3).      Problem List Items Addressed This Visit        Neuro    Vascular dementia with behavioral disturbance     Referral to neurology service for further recommendations.  Not sure what else her is to possibly add.  PT and OT referral sent as well.  Will see her back in the next several months, sooner if needed  Referral to chronic disease management given the frequency of her ER visits           Relevant Orders    Ambulatory referral/consult to Neurology    Ambulatory referral/consult to Outpatient Case Management       Renal/    Chronic kidney disease, stage 3a       Endocrine    Type 2 diabetes mellitus with other circulatory complications       Other Visit Diagnoses     Late onset Alzheimer's dementia without behavioral disturbance    -  Primary    Relevant Orders    SUBSEQUENT HOME HEALTH ORDERS    Ambulatory referral/consult to Neurology    Ambulatory referral/consult to Outpatient Case Management            Ally was seen today for follow-up.    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 behavioral disturbance  -     SUBSEQUENT HOME HEALTH ORDERS  -     Ambulatory referral/consult to Neurology; Future  -     Ambulatory referral/consult to Outpatient Case Management    Type 2 diabetes mellitus with other circulatory complications    Vascular dementia with behavioral disturbance  -     Ambulatory referral/consult to Neurology; Future  -     Ambulatory referral/consult to Outpatient Case Management    Chronic kidney disease, stage 3a               [unfilled]  Orders Placed This Encounter   Procedures    Ambulatory referral/consult to Neurology     Standing Status:   Future     Standing Expiration Date:   9/24/2023     Referral Priority:   Routine     Referral Type:   Consultation     Referral Reason:   Specialty Services Required     Referred to Provider:   Loi Todd MD     Requested Specialty:   Neurology     Number of Visits Requested:   1    Ambulatory referral/consult to Outpatient Case Management     Referral Priority:   Routine     Referral Type:   Consultation     Referral Reason:   Specialty Services Required     Number of Visits Requested:   1    SUBSEQUENT HOME HEALTH ORDERS     Subsequent Home Health Orders  Pt/ot/ nursing    Current Medications:  Current Outpatient Medications:  ascorbic acid, vitamin C, (VITAMIN C) 1000 MG tablet, Take 1,000 mg by mouth once daily., Disp: , Rfl:   aspirin (ECOTRIN) 81 MG EC tablet, Take 81 mg by mouth 3 (three) times a week., Disp: , Rfl:   cholecalciferol, vitamin D3, 125 mcg (5,000 unit) Tab, Take 5,000 Units by mouth once daily., Disp: , Rfl:    diclofenac sodium (VOLTAREN) 1 % Gel, APPLY 1 APPLICATION TOPICALLY 4 TIMES A DAY AS DIRECTED FOR 30 DAYS, Disp: , Rfl:   ELIQUIS 2.5 mg Tab, Take 2.5 mg by mouth 2 (two) times daily., Disp: , Rfl:   lactulose (CHRONULAC) 10 gram/15 mL solution, Take 15 mLs by mouth 3 (three) times daily., Disp: , Rfl:   NYSTOP powder, 2 (two) times daily. Apply to affected area, Disp: , Rfl:   triamcinolone acetonide 0.1% (KENALOG) 0.1 % cream, Apply topically 2 (two) times daily as needed (eczema)., Disp: 80 g, Rfl: 1    No current facility-administered medications for this visit.     Order Specific Question:   What Home Health Agency is the patient currently using?     Answer:   Other/External     Comments:   [facility]       Medication List with Changes/Refills   Current Medications    ASCORBIC ACID, VITAMIN C, (VITAMIN C) 1000 MG TABLET    Take 1,000 mg by mouth once daily.    ASPIRIN (ECOTRIN) 81 MG EC TABLET    Take 81 mg by mouth 3 (three) times a week.    CHOLECALCIFEROL, VITAMIN D3, 125 MCG (5,000 UNIT) TAB    Take 5,000 Units by mouth once daily.    DICLOFENAC SODIUM (VOLTAREN) 1 % GEL    APPLY 1 APPLICATION TOPICALLY 4 TIMES A DAY AS DIRECTED FOR 30 DAYS    ELIQUIS 2.5 MG TAB    Take 2.5 mg by mouth 2 (two) times daily.    LACTULOSE (CHRONULAC) 10 GRAM/15 ML SOLUTION    Take 15 mLs by mouth 3 (three) times daily.    NYSTOP POWDER    2 (two) times daily. Apply to affected area    TRIAMCINOLONE ACETONIDE 0.1% (KENALOG) 0.1 % CREAM    Apply topically 2 (two) times daily as needed (eczema).      Medication List with Changes/Refills   Current Medications    ASCORBIC ACID, VITAMIN C, (VITAMIN C) 1000 MG TABLET    Take 1,000 mg by mouth once daily.       Start Date: --        End Date: --    ASPIRIN (ECOTRIN) 81 MG EC TABLET    Take 81 mg by mouth 3 (three) times a week.       Start Date: --        End Date: --    CHOLECALCIFEROL, VITAMIN D3, 125 MCG (5,000 UNIT) TAB    Take 5,000 Units by mouth once daily.       Start  Date: --        End Date: --    DICLOFENAC SODIUM (VOLTAREN) 1 % GEL    APPLY 1 APPLICATION TOPICALLY 4 TIMES A DAY AS DIRECTED FOR 30 DAYS       Start Date: 7/11/2022 End Date: --    ELIQUIS 2.5 MG TAB    Take 2.5 mg by mouth 2 (two) times daily.       Start Date: 6/3/2022  End Date: --    LACTULOSE (CHRONULAC) 10 GRAM/15 ML SOLUTION    Take 15 mLs by mouth 3 (three) times daily.       Start Date: 6/22/2022 End Date: --    NYSTOP POWDER    2 (two) times daily. Apply to affected area       Start Date: 8/4/2022  End Date: --    TRIAMCINOLONE ACETONIDE 0.1% (KENALOG) 0.1 % CREAM    Apply topically 2 (two) times daily as needed (eczema).       Start Date: 8/2/2022  End Date: --        An office visit for an established patient was performed. 10 minutes was used for reviewing the patients chart prior to the inoffice visit done on that same day. 15 minutes was used during the visit in regards to taking the patient history and physical exam. There was also an additional 5 minutes spent on education and counseling regarding medical conditions, current medications including risk/benefit and side effects/adverse events, vaccine counseling. After leaving the exam room, the provider then spent an additional 5 minutes completing the electronic health record.    The patient is receptive, expresses understanding and is agreeable to plan. All questions answered; total time spent was 35 minutes.     Follow up if symptoms worsen or fail to improve.

## 2022-08-25 ENCOUNTER — TELEPHONE (OUTPATIENT)
Dept: INTERNAL MEDICINE | Facility: CLINIC | Age: 87
End: 2022-08-25
Payer: MEDICARE

## 2022-08-25 NOTE — TELEPHONE ENCOUNTER
----- Message from NarcisoNy Ismael sent at 8/25/2022 10:49 AM CDT -----  Regarding: Patient Call  Type:  Patient Returning Call    Who Called: pt  Who Left Message for Patient: Dr. SERRA  Does the patient know what this is regarding?: yes  Would the patient rather a call back or a response via [facility]?  Call back   Best Call Back Number [phone]   Additional Information:  patient was advised the neurologist she was recommended to isn't accepting patients until December .. would like a phone call back with other options for a sooner time

## 2022-08-30 NOTE — PROGRESS NOTES
Outpatient Care Management  Initial Patient Assessment    Patient: Ally Valdez  MRN: [id number]  Date of Service: 08/24/2022  Completed by: Kristal Lopez RN  Referral Date: 08/24/2022  Program: High Risk  Status: Ongoing  Effective Dates: 8/30/2022 - present  Responsible Staff: Kristal Lopez RN        Reason for Visit   Patient presents with    OPCM Chart Review     8/30/22    OPCM Enrollment Call     8/30/22    Initial Assessment     8/30/22    Nursing Assessment     8/30/22    Plan Of Care     8/30/22    OPCM Welcome Letter     8/30/22       Brief Summary:  Ally Valdez was referred by PCP for chronic disease management. Patient qualifies for program based on Risk score 76.8%.   Active problem list, medical, surgical and social history reviewed. Active comorbidities include Atrial fibrillation, HTN, PVD. Areas of need identified by patient include resources .   Complex care plan created with patient/caregiver input. By next encounter, patient agrees to discuss needs with [place].     Assessment Documentation     OPCM Initial Assessment    Involvement of Care  Do I have permission to speak with other family members about your care?: Yes (Comment: daughters- Yvonne, Lillydagmar)  Assessment completed by: Children (Comment: Yvonne- daughter)  Identified Areas of Need  Advanced Care Planning: Yes  Housing: no  Medication Adherence: No  *Active medication list was reviewed and reconciled with patient and/or caregiver:   Nutrition: no  Lab Adherence: no  Depression: No  Cognitive/Behavioral Health: yes  Communication: yes  Health Literacy: Yes  Fall risk?: No  Equipment/Supplies/Services: yes (Comment: requests shower chair)          Problem List and History         Reviewed medical and social history with patient and/or caregiver. A complex care plan was discussed and completed today, with input from patient and/or caregiver.    Patient Instructions     Instructions were provided via the [employer] patient  resources and are available for the patient to view on the patient portal, if active.    Next steps: Follow up on [place] outreach.Mail oral care list, advance planning education.       Todays OPCM Self-Management Care Plan was developed with the patients/caregivers input and was based on identified barriers from todays assessment.  Goals were written today with the patient/caregiver and the patient has agreed to work towards these goals to improve his/her overall well-being. Patient verbalized understanding of the care plan, goals, and all of today's instructions. Encouraged patient/caregiver to communicate with his/her physician and health care team about health conditions and the treatment plan.  Provided my contact information today and encouraged patient/caregiver to call me with any questions as needed.

## 2022-09-01 ENCOUNTER — TELEPHONE (OUTPATIENT)
Dept: INTERNAL MEDICINE | Facility: CLINIC | Age: 87
End: 2022-09-01
Payer: MEDICARE

## 2022-09-01 DIAGNOSIS — F02.80 LATE ONSET ALZHEIMER'S DEMENTIA WITHOUT BEHAVIORAL DISTURBANCE: Primary | ICD-10-CM

## 2022-09-01 DIAGNOSIS — G30.1 LATE ONSET ALZHEIMER'S DEMENTIA WITHOUT BEHAVIORAL DISTURBANCE: Primary | ICD-10-CM

## 2022-09-01 DIAGNOSIS — F01.518 VASCULAR DEMENTIA WITH BEHAVIORAL DISTURBANCE: ICD-10-CM

## 2022-09-01 DIAGNOSIS — R30.0 DYSURIA: Primary | ICD-10-CM

## 2022-09-01 NOTE — TELEPHONE ENCOUNTER
----- Message from Jannette Westfall sent at 9/1/2022 10:20 AM CDT -----  Patient's Daughter called stating her Mom's Urine has a strong smell. The daughter made sure she was hydrated but there is no change with the urine.     The daughter wants to know if she can get an order sent to Home Health for them to check her mom for a UTI.    Daughter's #: [phone]

## 2022-09-02 PROCEDURE — 87077 CULTURE AEROBIC IDENTIFY: CPT | Performed by: INTERNAL MEDICINE

## 2022-09-02 PROCEDURE — 81001 URINALYSIS AUTO W/SCOPE: CPT | Performed by: INTERNAL MEDICINE

## 2022-09-03 RX ORDER — CIPROFLOXACIN 250 MG/1
250 TABLET, FILM COATED ORAL 2 TIMES DAILY
Qty: 14 TABLET | Refills: 0 | Status: SHIPPED | OUTPATIENT
Start: 2022-09-03 | End: 2022-09-10

## 2022-09-06 ENCOUNTER — TELEPHONE (OUTPATIENT)
Dept: INTERNAL MEDICINE | Facility: CLINIC | Age: 87
End: 2022-09-06
Payer: MEDICARE

## 2022-09-06 DIAGNOSIS — K59.00 CONSTIPATION, UNSPECIFIED CONSTIPATION TYPE: Primary | ICD-10-CM

## 2022-09-07 RX ORDER — LACTULOSE 10 G/15ML
15 SOLUTION ORAL; RECTAL 3 TIMES DAILY
Qty: 1892 ML | Refills: 6 | Status: ON HOLD | OUTPATIENT
Start: 2022-09-07 | End: 2023-01-01 | Stop reason: HOSPADM

## 2022-09-12 ENCOUNTER — TELEPHONE (OUTPATIENT)
Dept: INTERNAL MEDICINE | Facility: CLINIC | Age: 87
End: 2022-09-12
Payer: MEDICARE

## 2022-09-19 ENCOUNTER — PATIENT MESSAGE (OUTPATIENT)
Dept: INTERNAL MEDICINE | Facility: CLINIC | Age: 87
End: 2022-09-19
Payer: MEDICARE

## 2022-09-20 ENCOUNTER — OFFICE VISIT (OUTPATIENT)
Dept: NEUROLOGY | Facility: CLINIC | Age: 87
End: 2022-09-20
Payer: MEDICARE

## 2022-09-20 ENCOUNTER — OUTPATIENT CASE MANAGEMENT (OUTPATIENT)
Dept: ADMINISTRATIVE | Facility: OTHER | Age: 87
End: 2022-09-20
Payer: MEDICARE

## 2022-09-20 VITALS — HEIGHT: 64 IN | WEIGHT: 151 LBS | RESPIRATION RATE: 18 BRPM | BODY MASS INDEX: 25.78 KG/M2

## 2022-09-20 DIAGNOSIS — G30.1 LATE ONSET ALZHEIMER'S DEMENTIA WITHOUT BEHAVIORAL DISTURBANCE: ICD-10-CM

## 2022-09-20 DIAGNOSIS — B36.9 FUNGAL DERMATITIS: Primary | ICD-10-CM

## 2022-09-20 DIAGNOSIS — F02.80 LATE ONSET ALZHEIMER'S DEMENTIA WITHOUT BEHAVIORAL DISTURBANCE: ICD-10-CM

## 2022-09-20 DIAGNOSIS — R40.20 LOSS OF CONSCIOUSNESS: Primary | ICD-10-CM

## 2022-09-20 DIAGNOSIS — F01.518 VASCULAR DEMENTIA WITH BEHAVIORAL DISTURBANCE: ICD-10-CM

## 2022-09-20 PROCEDURE — 99213 OFFICE O/P EST LOW 20 MIN: CPT | Mod: PBBFAC | Performed by: PSYCHIATRY & NEUROLOGY

## 2022-09-20 PROCEDURE — 99205 OFFICE O/P NEW HI 60 MIN: CPT | Mod: S$PBB,,, | Performed by: PSYCHIATRY & NEUROLOGY

## 2022-09-20 PROCEDURE — 99999 PR PBB SHADOW E&M-EST. PATIENT-LVL III: CPT | Mod: PBBFAC,,, | Performed by: PSYCHIATRY & NEUROLOGY

## 2022-09-20 PROCEDURE — 99999 PR PBB SHADOW E&M-EST. PATIENT-LVL III: ICD-10-PCS | Mod: PBBFAC,,, | Performed by: PSYCHIATRY & NEUROLOGY

## 2022-09-20 PROCEDURE — 99205 PR OFFICE/OUTPT VISIT, NEW, LEVL V, 60-74 MIN: ICD-10-PCS | Mod: S$PBB,,, | Performed by: PSYCHIATRY & NEUROLOGY

## 2022-09-20 RX ORDER — DIAPER,BRIEF,ADULT, DISPOSABLE
EACH MISCELLANEOUS DAILY
COMMUNITY
Start: 2022-09-19 | End: 2022-09-20 | Stop reason: SDUPTHER

## 2022-09-20 RX ORDER — NYSTATIN 100000 [USP'U]/G
POWDER TOPICAL 2 TIMES DAILY
Qty: 60 G | Refills: 2 | Status: SHIPPED | OUTPATIENT
Start: 2022-09-20 | End: 2022-01-01

## 2022-09-20 RX ORDER — TROLAMINE SALICYLATE 10 G/100G
CREAM TOPICAL
Status: ON HOLD | COMMUNITY
End: 2023-01-01 | Stop reason: HOSPADM

## 2022-09-20 NOTE — PROGRESS NOTES
Subjective:       Patient ID: Ally Valdez is a 87 y.o. female.    Chief Complaint: Dementia (NP-Alzheimer's /Patient diagnosed in 2018 or 2019 /Short-term memory loss /)    HPI  10 year dementia hx  Lives at home with daughters; requrires assist with ADLs  Recent UTI; affected her walking, recovered; typically uses a walker at home  A few weeks ago, not assoc with UTI, had sudden LOC while sitting on the porch; had foaming at the mouth and R shoulder twitching  Not on memory meds  No prior h/o seizure  Review of Systems    The remainder of the 14 system ROS is noncontributory or negative unless mentioned/reviewed above.    Objective:      Physical Exam  unable to complete MMSE  Awake, not alert  Paucity of verbal output    Cranial Nerve: Pupils are equal, round, and reactive to light. Visual fields are intact to confrontation. Normal fundi. Ocular movements are intact. Face is symmetric at rest and with activation with intact sensation throughout. Hearing intact to finger rub bilaterally. Muscles of tongue and palate activate symmetrically. No dysarthria. Strength is full in sternocleidomastoid and trapezius bilaterally.    Motor: Muscle bulk and tone are normal. Strength is 5/5 in all four extremities both proximally and distally. Intact fine motor movements bilaterally. There is no pronator drift or satelliting on arm roll.    Sensory: Sensation is intact to light touch, pinprick, vibration, and proprioception throughout. Romberg is negative.    Reflexes: 2+ and symmetric at the biceps, triceps, brachioradialis, patella, and Achilles bilaterally. Plantar response is flexor bilaterally.    Coordination: No dysmetria on finger-nose-finger or heel-knee-shin. Normal rapid alternating movements. Fast finger tapping with normal amplitude and speed.    Gait: in wc    Assessment:       Problem List Items Addressed This Visit          Neuro    Vascular dementia with behavioral disturbance     Other Visit Diagnoses        Loss of consciousness    -  Primary    Relevant Orders    EEG,w/awake & asleep record    Late onset Alzheimer's dementia without behavioral disturbance        Relevant Orders    EEG,w/awake & asleep record              Plan:       ? Sz  eeg

## 2022-09-21 NOTE — PROGRESS NOTES
Outpatient Care Management  Plan of Care Follow Up Visit    Patient: Ally Valdez  MRN: [id number]  Date of Service: 09/20/2022  Completed by: Kristal Lopez RN  Referral Date: 08/24/2022    Reason for Visit   Patient presents with    OPCM Chart Review     9/7/22-late entry  9/20/22    Update Plan Of Care     9/7/22 -late entry  9/21/22    OPCM RN First Follow-Up Attempt     9/20/22       Brief Summary:   9/21/22- OPCM follow up call completed with Yvonne, pt's daughter. No complaints noted at this time. Refer to care plan. Pt agrees to follow up call on or around 9/21/22.   9/7/22-OPCM follow up call completed with pt's daughter, Yvonne. Continue education on caring for dementia patient and assistance on resources. Pt agrees to follow up call on or around 9/20/22.    Patient Summary     Involvement of Care:  Do I have permission to speak with other family members about your care?  Yes    Patient Reported Labs & Vitals:  1.  Any Patient Reported Labs & Vitals?  No  2.  Patient Reported Blood Pressure:     3.  Patient Reported Pulse:     4.  Patient Reported Weight (Kg):     5.  Patient Reported Blood Glucose (mg/dl):       Medical and social history was reviewed with patient and/or caregiver.     Clinical Assessment     Reviewed and provided basic information on available community resources for mental health, transportation, wellness resources, and palliative care programs with patient and/or caregiver.     Complex Care Plan     Care plan was discussed and completed today with input from patient and/or caregiver.    Patient Instructions     Instructions were provided via the [employer] patient resources and are available for the patient to view on the patient portal.    Next Steps: Follow up: did patient receive assistance from [employer]? [employer] food access?     Follow up in about 8 days (around 9/28/2022).    Todays OPCM Self-Management Care Plan was developed with the patients/caregivers  input and was based on identified barriers from todays assessment.  Goals were written today with the patient/caregiver and the patient has agreed to work towards these goals to improve his/her overall well-being. Patient verbalized understanding of the care plan, goals, and all of today's instructions. Encouraged patient/caregiver to communicate with his/her physician and health care team about health conditions and the treatment plan.  Provided my contact information today and encouraged patient/caregiver to call me with any questions as needed.

## 2022-09-26 ENCOUNTER — PROCEDURE VISIT (OUTPATIENT)
Dept: SLEEP MEDICINE | Facility: HOSPITAL | Age: 87
End: 2022-09-26
Attending: PSYCHIATRY & NEUROLOGY
Payer: MEDICARE

## 2022-09-26 DIAGNOSIS — F02.80 LATE ONSET ALZHEIMER'S DEMENTIA WITHOUT BEHAVIORAL DISTURBANCE: ICD-10-CM

## 2022-09-26 DIAGNOSIS — R40.20 LOSS OF CONSCIOUSNESS: ICD-10-CM

## 2022-09-26 DIAGNOSIS — G30.1 LATE ONSET ALZHEIMER'S DEMENTIA WITHOUT BEHAVIORAL DISTURBANCE: ICD-10-CM

## 2022-09-26 PROCEDURE — 95816 EEG AWAKE AND DROWSY: CPT | Mod: 26,,, | Performed by: PSYCHIATRY & NEUROLOGY

## 2022-09-26 PROCEDURE — 95816 PR EEG,W/AWAKE & DROWSY RECORD: ICD-10-PCS | Mod: 26,,, | Performed by: PSYCHIATRY & NEUROLOGY

## 2022-09-26 PROCEDURE — 95819 EEG AWAKE AND ASLEEP: CPT

## 2022-09-26 NOTE — PROCEDURES
EEG    Dx: Seizure    Duration: 20 minutes    Technical: Standard digital EEG was performed at [facility]. The 10/20 international system of electrode placement was used.  Photic   stimulation and hyperventilation were not performed as activating   procedures.    Description: The posterior dominant rhythm was 6 Hz.  There   were no lateralizing features.  The patient was awake for the duration of the study.  There were   no epileptiform discharges or clinical seizures seen.    Impression: Moderate slowing consistent with the patient's history of dementia. No evidence of seizure activity.      Procedures

## 2022-09-29 ENCOUNTER — OUTPATIENT CASE MANAGEMENT (OUTPATIENT)
Dept: ADMINISTRATIVE | Facility: OTHER | Age: 87
End: 2022-09-29
Payer: MEDICARE

## 2022-09-29 NOTE — PROGRESS NOTES
Outpatient Care Management  Plan of Care Follow Up Visit    Patient: Ally Valdez  MRN: [id number]  Date of Service: 09/29/2022  Completed by: Kristal Lopez RN  Referral Date: 08/24/2022    Reason for Visit   Patient presents with    OPCM Chart Review     9/29/22    Update Plan Of Care     9/29/22       Brief Summary: OPCM follow up call completed with pt's daughter Yvonne. Pt denies any complaints at this time. Continue education regarding dementia and follow up on needed resources. Yvonne agrees to follow up call on or around 10/6/22.    Patient Summary     Involvement of Care:  Do I have permission to speak with other family members about your care?  Yes    Patient Reported Labs & Vitals:  1.  Any Patient Reported Labs & Vitals?  No  2.  Patient Reported Blood Pressure:     3.  Patient Reported Pulse:     4.  Patient Reported Weight (Kg):     5.  Patient Reported Blood Glucose (mg/dl):       Medical and social history was reviewed with patient and/or caregiver.     Clinical Assessment     Reviewed and provided basic information on available community resources for mental health, transportation, wellness resources, and palliative care programs with patient and/or caregiver.     Complex Care Plan     Care plan was discussed and completed today with input from patient and/or caregiver.    Patient Instructions     Instructions were provided via the [employer] patient resources and are available for the patient to view on the patient portal.    Next Steps: Follow up on cardiac testing.       Todays OPCM Self-Management Care Plan was developed with the patients/caregivers input and was based on identified barriers from todays assessment.  Goals were written today with the patient/caregiver and the patient has agreed to work towards these goals to improve his/her overall well-being. Patient verbalized understanding of the care plan, goals, and all of today's instructions. Encouraged patient/caregiver to  communicate with his/her physician and health care team about health conditions and the treatment plan.  Provided my contact information today and encouraged patient/caregiver to call me with any questions as needed.

## 2022-10-06 ENCOUNTER — OUTPATIENT CASE MANAGEMENT (OUTPATIENT)
Dept: ADMINISTRATIVE | Facility: OTHER | Age: 87
End: 2022-10-06
Payer: MEDICARE

## 2022-10-06 NOTE — PROGRESS NOTES
Outpatient Care Management  Plan of Care Follow Up Visit    Patient: Ally Valdez  MRN: [id number]  Date of Service: 10/06/2022  Completed by: Kristal Lopez RN  Referral Date: 08/24/2022    Reason for Visit   Patient presents with    OPCM Chart Review     10/6/22    Update Plan Of Care     10/6/22       Brief Summary: OPCM follow up call completed with lainey Russell's daughter. Continue education on dementia and follow up on resource needs. She agrees to follow up call in or around 10/20/22.    Patient Summary     Involvement of Care:  Do I have permission to speak with other family members about your care?  Yes    Patient Reported Labs & Vitals:  1.  Any Patient Reported Labs & Vitals?  No  2.  Patient Reported Blood Pressure:     3.  Patient Reported Pulse:     4.  Patient Reported Weight (Kg):     5.  Patient Reported Blood Glucose (mg/dl):       Medical and social history was reviewed with patient and/or caregiver.     Clinical Assessment     Reviewed and provided basic information on available community resources for mental health, transportation, wellness resources, and palliative care programs with patient and/or caregiver.     Complex Care Plan     Care plan was discussed and completed today with input from patient and/or caregiver.    Patient Instructions     Instructions were provided via the [facility] patient resources and are available for the patient to view on the patient portal.      [employer] OPCM Self-Management Care Plan was developed with the patients/caregivers input and was based on identified barriers from [facility] assessment.  Goals were written today with the patient/caregiver and the patient has agreed to work towards these goals to improve his/her overall well-being. Patient verbalized understanding of the care plan, goals, and all of today's instructions. Encouraged patient/caregiver to communicate with his/her physician and health care team about health conditions and the treatment  plan.  Provided my contact information today and encouraged patient/caregiver to call me with any questions as needed.

## 2022-10-10 ENCOUNTER — OFFICE VISIT (OUTPATIENT)
Dept: NEUROLOGY | Facility: CLINIC | Age: 87
End: 2022-10-10
Payer: MEDICARE

## 2022-10-10 VITALS
HEART RATE: 80 BPM | BODY MASS INDEX: 25.78 KG/M2 | HEIGHT: 64 IN | WEIGHT: 151 LBS | SYSTOLIC BLOOD PRESSURE: 143 MMHG | DIASTOLIC BLOOD PRESSURE: 83 MMHG

## 2022-10-10 DIAGNOSIS — G30.1 LATE ONSET ALZHEIMER'S DEMENTIA WITHOUT BEHAVIORAL DISTURBANCE: Primary | ICD-10-CM

## 2022-10-10 DIAGNOSIS — F02.80 LATE ONSET ALZHEIMER'S DEMENTIA WITHOUT BEHAVIORAL DISTURBANCE: Primary | ICD-10-CM

## 2022-10-10 PROCEDURE — 99213 OFFICE O/P EST LOW 20 MIN: CPT | Mod: S$PBB,,, | Performed by: PSYCHIATRY & NEUROLOGY

## 2022-10-10 PROCEDURE — 99999 PR PBB SHADOW E&M-EST. PATIENT-LVL IV: ICD-10-PCS | Mod: PBBFAC,,, | Performed by: PSYCHIATRY & NEUROLOGY

## 2022-10-10 PROCEDURE — 99214 OFFICE O/P EST MOD 30 MIN: CPT | Mod: PBBFAC | Performed by: PSYCHIATRY & NEUROLOGY

## 2022-10-10 PROCEDURE — 99999 PR PBB SHADOW E&M-EST. PATIENT-LVL IV: CPT | Mod: PBBFAC,,, | Performed by: PSYCHIATRY & NEUROLOGY

## 2022-10-10 PROCEDURE — 99213 PR OFFICE/OUTPT VISIT, EST, LEVL III, 20-29 MIN: ICD-10-PCS | Mod: S$PBB,,, | Performed by: PSYCHIATRY & NEUROLOGY

## 2022-10-10 NOTE — PROGRESS NOTES
Subjective:       Patient ID: Ally Valdez is a 87 y.o. female.    Chief Complaint: Dementia (EEG results)    HPI    No new events since last seen  No new issues since last seen  No behavioral problems reported  Not on memory meds    Eeg shows 6 hz slowing; o/w negative  Review of Systems    The remainder of the 14 system ROS is noncontributory or negative unless mentioned/reviewed above.    Objective:      Physical Exam  Mental Status: asleep    Cranial Nerve: Pupils are equal, round, and reactive to light. Visual fields are intact to confrontation. Normal fundi. Ocular movements are intact. Face is symmetric at rest and with activation with intact sensation throughout. Hearing intact to finger rub bilaterally. Muscles of tongue and palate activate symmetrically. No dysarthria. Strength is full in sternocleidomastoid and trapezius bilaterally.    Motor: Muscle bulk and tone are normal. Strength is 5/5 in all four extremities both proximally and distally. Intact fine motor movements bilaterally. There is no pronator drift or satelliting on arm roll.    Sensory: Sensation is intact to light touch, pinprick, vibration, and proprioception throughout. Romberg is negative.    Reflexes: 2+ and symmetric at the biceps, triceps, brachioradialis, patella, and Achilles bilaterally. Plantar response is flexor bilaterally.    Coordination: not tested  Gait: in wc    Assessment:       Problem List Items Addressed This Visit    None  Visit Diagnoses       Late onset Alzheimer's dementia without behavioral disturbance    -  Primary              Plan:       AD  Advanced  No evidence of sz on eeg  Rtc as needed

## 2022-10-20 ENCOUNTER — OUTPATIENT CASE MANAGEMENT (OUTPATIENT)
Dept: ADMINISTRATIVE | Facility: OTHER | Age: 87
End: 2022-10-20
Payer: MEDICARE

## 2022-10-20 NOTE — PROGRESS NOTES
Outpatient Care Management  Plan of Care Follow Up Visit    Patient: Ally Valdez  MRN: [id number]  Date of Service: 10/20/2022  Completed by: Kristal Lopez RN  Referral Date: 08/24/2022    Reason for Visit   Patient presents with    OPCM Chart Review     10/25/2022    OPCM RN First Follow-Up Attempt     10/20/22    Update Plan Of Care     10/25/2022       Brief Summary: OPCM follow up call completed with pt's daughter, Yvonne. Continue education on dementia and discussed community resources for continued health of patient as well as caregivers' needs. Yvonne agrees to follow up call on or around 11/8/2022.     Patient Summary     Involvement of Care:  Do I have permission to speak with other family members about your care?  Yes    Patient Reported Labs & Vitals:  1.  Any Patient Reported Labs & Vitals?  No  2.  Patient Reported Blood Pressure:     3.  Patient Reported Pulse:     4.  Patient Reported Weight (Kg):     5.  Patient Reported Blood Glucose (mg/dl):       Medical and social history was reviewed with patient and/or caregiver.     Clinical Assessment     Reviewed and provided basic information on available community resources for mental health, transportation, wellness resources, and palliative care programs with patient and/or caregiver.     Complex Care Plan     Care plan was discussed and completed today with input from patient and/or caregiver.    Patient Instructions     Instructions were provided via the [facility] patient resources and are available for the patient to view on the patient portal.    Next Steps: Consider case closure if no further needs verbalized.         Todays OPCM Self-Management Care Plan was developed with the patients/caregivers input and was based on identified barriers from todays assessment.  Goals were written today with the patient/caregiver and the patient has agreed to work towards these goals to improve his/her overall well-being. Patient verbalized  understanding of the care plan, goals, and all of today's instructions. Encouraged patient/caregiver to communicate with his/her physician and health care team about health conditions and the treatment plan.  Provided my contact information today and encouraged patient/caregiver to call me with any questions as needed.    10/20/22- 1st attempt to complete follow-up for Outpatient Care Management: Left message for patient requesting a return call. Will attempt to contact patient again at a later date.

## 2022-11-04 PROCEDURE — G0179 PR HOME HEALTH MD RECERTIFICATION: ICD-10-PCS | Mod: ,,, | Performed by: INTERNAL MEDICINE

## 2022-11-04 PROCEDURE — G0179 MD RECERTIFICATION HHA PT: HCPCS | Mod: ,,, | Performed by: INTERNAL MEDICINE

## 2022-11-08 ENCOUNTER — OUTPATIENT CASE MANAGEMENT (OUTPATIENT)
Dept: ADMINISTRATIVE | Facility: OTHER | Age: 87
End: 2022-11-08
Payer: MEDICARE

## 2022-11-08 NOTE — PROGRESS NOTES
Outpatient Care Management  Plan of Care Follow Up Visit    Patient: Ally Valdez  MRN: [id number]  Date of Service: 11/08/2022  Completed by: Kristal Lopez RN  Referral Date: 08/24/2022    Reason for Visit   Patient presents with    OPCM Chart Review     11/8/2022    Update Plan Of Care     11/8/2022    Case Closure     11/8/2022       Brief Summary: OPCM follow up call with case closure completed with Yvonne pt's daughter. Continued education on resources available like [employer] through [email or web address] and [employer]. Call placed to [email or web address] for updated calendar for November and December. Left voicemail for Ingrid ([email or web address] coordinator). Will call again at a later time as well as email Yvonne updated information. No other needs noted at this time. Yvonne verbalized understanding to contact RN for any future OPCM needs. Yvonne is in agreement with closing case at this time.     Patient Summary     Involvement of Care:  Do I have permission to speak with other family members about your care?  Yes    Patient Reported Labs & Vitals:  1.  Any Patient Reported Labs & Vitals?  No  2.  Patient Reported Blood Pressure:     3.  Patient Reported Pulse:     4.  Patient Reported Weight (Kg):     5.  Patient Reported Blood Glucose (mg/dl):       Medical and social history was reviewed with patient and/or caregiver.     Clinical Assessment     Reviewed and provided basic information on available community resources for mental health, transportation, wellness resources, and palliative care programs with patient and/or caregiver.     Complex Care Plan     Care plan was discussed and completed today with input from patient and/or caregiver.    Patient Instructions     Instructions were provided via the [employer] patient resources and are available for the patient to view on the patient portal.    Next Steps: Email Yvonne information of mobile market.         Todays OPCM Self-Management Care  Plan was developed with the patients/caregivers input and was based on identified barriers from todays assessment.  Goals were written today with the patient/caregiver and the patient has agreed to work towards these goals to improve his/her overall well-being. Patient verbalized understanding of the care plan, goals, and all of today's instructions. Encouraged patient/caregiver to communicate with his/her physician and health care team about health conditions and the treatment plan.  Provided my contact information today and encouraged patient/caregiver to call me with any questions as needed.

## 2022-11-23 NOTE — TELEPHONE ENCOUNTER
----- Message from Dong Vogt MD sent at 11/23/2022 12:33 PM CST -----  Ok for UA with reflex  ----- Message -----  From: Jannette Westfall  Sent: 11/23/2022  11:34 AM CST  To: MD Cande Saxena from [facility] is calling to see if they can get a UTI order for CNS.   Cande stated pt's daughter claims mom may have UTI.      
Pt daughter informed about UA order   
19-[birth year]2019

## 2022-11-23 NOTE — TELEPHONE ENCOUNTER
----- Message from Dong Vogt MD sent at 11/23/2022 12:33 PM CST -----  Ok for UA with reflex  ----- Message -----  From: Jannette Westfall  Sent: 11/23/2022  11:34 AM CST  To: MD Cande Saxena from [facility] is calling to see if they can get a UTI order for CNS.   Cande stated pt's daughter claims mom may have UTI.

## 2022-11-24 NOTE — TELEPHONE ENCOUNTER
Her daughter Yvonne called with questions regarding a UA done yesterday.  I returned the call to the number given by the answering service and left a message on the voicemail.  I checked the phone number in the computer and it was slightly different I tried that number and left a message there too.

## 2022-11-27 NOTE — ED PROVIDER NOTES
Source of History:  Patient and daughter, moderate limitations due to dementia    Chief complaint:  Headache      HPI:  Ally Valdez is a 87 y.o. female presenting with Headache         Patient presents for evaluation of headache.  The pain is located in the frontal region.  Onset of symptoms was abrupt starting 2 hours ago and has been gradually improving since. Pain is described as aching.  It is brought on by no particular thing. The patient rates the pain as localized.  The patient also complains of  elevated blood pressure .  The patient denies  chest pain, cough, ear pain . The patient has a history of  rare similar headaches .   A past headache workup has included CT. The patient also complains of sinus congestion and lack of appetite lately.  The patient denies fever, chills, cough, chest pain, abdominal pain. Care prior to arrival consisted of ASA, with moderate relief.        Review of Systems   Unable to obtain due to dementia    Review of patient's allergies indicates:   Allergen Reactions    Lisinopril Swelling    Fluconazole      Drug interaction w/Eliquis       PMH:  As per HPI and below:    Past Medical History:   Diagnosis Date    A-fib     Alzheimer disease     Dementia     Foot ulcer, left     Hypertension     Paroxysmal atrial fibrillation     PVD (peripheral vascular disease)         Family History   Problem Relation Age of Onset    Cancer Mother     Alzheimer's disease Father        Past Surgical History:   Procedure Laterality Date     SECTION  1973    Stent placement right thigh  2021       Social History     Tobacco Use    Smoking status: Never    Smokeless tobacco: Never   Substance Use Topics    Alcohol use: Never    Drug use: Never       Patient Active Problem List   Diagnosis    Physical deconditioning    Type 2 diabetes mellitus with other circulatory complications    Vascular dementia with behavioral disturbance    Chronic kidney disease, stage 3a     "    Physical Exam:    BP (!) 158/89   Pulse 81   Temp 98.3 °F (36.8 °C)   Resp 19   Ht 5' 4" (1.626 m)   Wt 69.9 kg (154 lb)   SpO2 98%   BMI 26.43 kg/m²     Nursing note and vital signs reviewed.    General:  Alert, no acute distress.   Skin: Normal for Ethnic Origin, No cyanosis  HEENT: Normocephalic and atraumatic, Vision unchanged, Pupils symmetric, No icterus , Nasal mucosa is pink and moist, mild nasal congestion  Cardiovascular:  no jvd, mild edema  Chest Wall: No deformity, equal chest rise  Respiratory:  Lungs are clear to auscultation, respirations are non-labored.    Musculoskeletal:  No deformity, Normal perfusion to all extremities  : No suprapubic pain  Gastrointestinal:  Soft, Non distended  Neurological:  Alert, baseline orientation per daughter, normal motor observed, normal speech observed.    Psychiatric:  Cooperative, appropriate mood & affect.        Labs that have been ordered have been independently reviewed and interpreted by myself.     Old Chart Reviewed.      Initial Impression/ Differential Dx:  Migraine headache, cluster headache, tension headache eye strain, and infectious causes such as meningitis, pharyngitis and sinusitis, other dangerous causes such as subarachnoid hemorrhage, tumor      MDM:      Reviewed Nurses Note.    Reviewed Pertinent old records.    Orders Placed This Encounter    CT Head Without Contrast    COVID/FLU A&B PCR    CBC Auto Differential    Comprehensive Metabolic Panel    Magnesium    CBC with Differential    Insert peripheral IV                    Labs Reviewed   COMPREHENSIVE METABOLIC PANEL - Abnormal; Notable for the following components:       Result Value    Chloride 108 (*)     Glucose Level 143 (*)     Blood Urea Nitrogen 28.1 (*)     Creatinine 1.06 (*)     Globulin 3.7 (*)     Albumin/Globulin Ratio 1.0 (*)     All other components within normal limits   CBC WITH DIFFERENTIAL - Abnormal; Notable for the following components:    RBC 3.96 (*)  "    Hgb 11.2 (*)     Hct 32.7 (*)     MPV 10.9 (*)     All other components within normal limits   COVID/FLU A&B PCR - Normal    Narrative:     The Xpert Xpress SARS-CoV-2/FLU/RSV plus is a rapid, multiplexed real-time PCR test intended for the simultaneous qualitative detection and differentiation of SARS-CoV-2, Influenza A, Influenza B, and respiratory syncytial virus (RSV) viral RNA in either nasopharyngeal swab or nasal swab specimens.         MAGNESIUM - Normal   CBC W/ AUTO DIFFERENTIAL    Narrative:     The following orders were created for panel order CBC Auto Differential.  Procedure                               Abnormality         Status                     ---------                               -----------         ------                     CBC with Differential[[id number]]        Abnormal            Final result                 Please view results for these tests on the individual orders.          CT Head Without Contrast   Final Result   Impression:      No acute intracranial process identified. Details as above.      No significant discrepancy with overnight report.         Electronically signed by: Ronak Bailey   Date:    11/27/2022   Time:    07:07           Admission on 11/27/2022, Discharged on 11/27/2022   Component Date Value Ref Range Status    Influenza A PCR 11/27/2022 Not Detected  Not Detected Final    Influenza B PCR 11/27/2022 Not Detected  Not Detected Final    SARS-CoV-2 PCR 11/27/2022 Not Detected  Not Detected Final    Sodium Level 11/27/2022 143  136 - 145 mmol/L Final    Potassium Level 11/27/2022 4.1  3.5 - 5.1 mmol/L Final    Chloride 11/27/2022 108 (H)  98 - 107 mmol/L Final    Carbon Dioxide 11/27/2022 24  23 - 31 mmol/L Final    Glucose Level 11/27/2022 143 (H)  82 - 115 mg/dL Final    Blood Urea Nitrogen 11/27/2022 28.1 (H)  9.8 - 20.1 mg/dL Final    Creatinine 11/27/2022 1.06 (H)  0.55 - 1.02 mg/dL Final    Calcium Level Total 11/27/2022 9.6  8.4 - 10.2 mg/dL Final    Protein  Total 11/27/2022 7.3  5.8 - 7.6 gm/dL Final    Albumin Level 11/27/2022 3.6  3.4 - 4.8 gm/dL Final    Globulin 11/27/2022 3.7 (H)  2.4 - 3.5 gm/dL Final    Albumin/Globulin Ratio 11/27/2022 1.0 (L)  1.1 - 2.0 ratio Final    Bilirubin Total 11/27/2022 0.6  <=1.5 mg/dL Final    Alkaline Phosphatase 11/27/2022 90  40 - 150 unit/L Final    Alanine Aminotransferase 11/27/2022 36  0 - 55 unit/L Final    Aspartate Aminotransferase 11/27/2022 30  5 - 34 unit/L Final    eGFR 11/27/2022 51  mls/min/1.73/m2 Final    Magnesium Level 11/27/2022 1.90  1.60 - 2.60 mg/dL Final    WBC 11/27/2022 6.9  4.5 - 11.5 x10(3)/mcL Final    RBC 11/27/2022 3.96 (L)  4.20 - 5.40 x10(6)/mcL Final    Hgb 11/27/2022 11.2 (L)  12.0 - 16.0 gm/dL Final    Hct 11/27/2022 32.7 (L)  37.0 - 47.0 % Final    MCV 11/27/2022 82.6  80.0 - 94.0 fL Final    MCH 11/27/2022 28.3  27.0 - 31.0 pg Final    MCHC 11/27/2022 34.3  33.0 - 36.0 mg/dL Final    RDW 11/27/2022 13.2  11.5 - 17.0 % Final    Platelet 11/27/2022 189  130 - 400 x10(3)/mcL Final    MPV 11/27/2022 10.9 (H)  7.4 - 10.4 fL Final    Neut % 11/27/2022 62.1  % Final    Lymph % 11/27/2022 27.0  % Final    Mono % 11/27/2022 8.8  % Final    Eos % 11/27/2022 1.7  % Final    Basophil % 11/27/2022 0.3  % Final    Lymph # 11/27/2022 1.87  0.6 - 4.6 x10(3)/mcL Final    Neut # 11/27/2022 4.3  2.1 - 9.2 x10(3)/mcL Final    Mono # 11/27/2022 0.61  0.1 - 1.3 x10(3)/mcL Final    Eos # 11/27/2022 0.12  0 - 0.9 x10(3)/mcL Final    Baso # 11/27/2022 0.02  0 - 0.2 x10(3)/mcL Final    IG# 11/27/2022 0.01  0 - 0.04 x10(3)/mcL Final    IG% 11/27/2022 0.1  % Final    NRBC% 11/27/2022 0.0  % Final       Imaging Results              CT Head Without Contrast (Final result)  Result time 11/27/22 07:07:54      Final result by Ronak Bailey MD (11/27/22 07:07:54)                   Impression:    Impression:    No acute intracranial process identified. Details as above.    No significant discrepancy with overnight  report.      Electronically signed by: Ronak Bailey  Date:    11/27/2022  Time:    07:07               Narrative:      Technique:CT of the head was performed without intravenous contrast with axial as well as coronal and sagittal images.    Comparison:Comparison is with study dated August 21, 2022    Dosage Information:Automated exposure control was utilized.      Clinical history:Headache.    Findings:There is no significant interval change.    Hemorrhage:No acute intracranial hemorrhage is seen.    CSF spaces:The ventricles, sulci and basal cisterns all appear moderately prominent consistent with global cerebral atrophy.    Brain parenchyma:There is no acute large vessel territory infarct.  Chronic microvascular change is seen in portions of the periventricular and deep white matter tracts. Focal cystic encephalomalacia is again seen in the right occipital lobe (series 3, image 19).    Cerebellum:Unremarkable.    Sella and skull base:The sella appears to be within normal limits for age.    Herniation:None.    Calvarium:No acute linear or depressed skull fracture is seen.    Paranasal sinuses:The visualized paranasal sinuses appear clear with no significant mucoperiosteal thickening or air fluid levels identified.                        Preliminary result by Ronak Bailey MD (11/27/22 06:51:27)                   Narrative:    START OF REPORT:  Technique: CT of the head was performed without intravenous contrast with axial as well as coronal and sagittal images.    Comparison: Comparison is with study dated â2022-08-21 02:49:38â.    Dosage Information: Automated exposure control was utilized.    Clinical history: Confused AMS.    Findings:  Hemorrhage: No acute intracranial hemorrhage is seen.  CSF spaces: The ventricles, sulci and basal cisterns all appear moderately prominent consistent with global cerebral atrophy.  Brain parenchyma: Unremarkable with preservation of the grey white junction  throughout. Moderate stable appearing microvascular change is seen in portions of the periventricular and deep white matter tracts. Encephalomalacic changes are again seen in the right occipital lobe, associated with ex vacuo dilatation of the right lateral ventricle.  Cerebellum: Unremarkable.  Sella and skull base: The sella appears to be within normal limits for age.  Intracranial calcifications: Incidental note is made of bilateral choroid plexus calcification. Incidental note is made of some pineal region calcification. Incidental note is made of some calcification of the falx.  Calvarium: No acute linear or depressed skull fracture is seen.    Maxillofacial Structures:  Paranasal sinuses: The visualized paranasal sinuses appear clear with no mucoperiosteal thickening or air fluid levels identified.  Orbits: The orbits appear unremarkable.  Zygomatic arches: The zygomatic arches are intact and unremarkable.  Temporal bones and mastoids: The temporal bones and mastoids appear unremarkable.  TMJ: The mandibular condyles appear normally placed with respect to the mandibular fossa.  Nasal Bones: No displaced nasal bone fracture is seen.    Visualized upper cervical spine: The visualized cervical spine appears unremarkable.      Impression:  1. Encephalomalacic changes are again seen in the right occipital lobe, associated with ex vacuo dilatation of the right lateral ventricle.  2. No acute intracranial process identified. Details and findings as noted above.                          Preliminary result by Kole Steward Jr., MD (11/27/22 06:51:27)                   Narrative:    START OF REPORT:  Technique: CT of the head was performed without intravenous contrast with axial as well as coronal and sagittal images.    Comparison: Comparison is with study dated â2022-08-21 02:49:38â.    Dosage Information: Automated exposure control was utilized.    Clinical history: Confused AMS.    Findings:  Hemorrhage:  No acute intracranial hemorrhage is seen.  CSF spaces: The ventricles, sulci and basal cisterns all appear moderately prominent consistent with global cerebral atrophy.  Brain parenchyma: Unremarkable with preservation of the grey white junction throughout. Moderate stable appearing microvascular change is seen in portions of the periventricular and deep white matter tracts. Encephalomalacic changes are again seen in the right occipital lobe, associated with ex vacuo dilatation of the right lateral ventricle.  Cerebellum: Unremarkable.  Sella and skull base: The sella appears to be within normal limits for age.  Intracranial calcifications: Incidental note is made of bilateral choroid plexus calcification. Incidental note is made of some pineal region calcification. Incidental note is made of some calcification of the falx.  Calvarium: No acute linear or depressed skull fracture is seen.    Maxillofacial Structures:  Paranasal sinuses: The visualized paranasal sinuses appear clear with no mucoperiosteal thickening or air fluid levels identified.  Orbits: The orbits appear unremarkable.  Zygomatic arches: The zygomatic arches are intact and unremarkable.  Temporal bones and mastoids: The temporal bones and mastoids appear unremarkable.  TMJ: The mandibular condyles appear normally placed with respect to the mandibular fossa.  Nasal Bones: No displaced nasal bone fracture is seen.    Visualized upper cervical spine: The visualized cervical spine appears unremarkable.      Impression:  1. Encephalomalacic changes are again seen in the right occipital lobe, associated with ex vacuo dilatation of the right lateral ventricle.  2. No acute intracranial process identified. Details and findings as noted above.                                                                             Diagnostic Impression:    1. Frontal headache    2. Elevated blood pressure reading    3. Age-related cognitive decline         ED Disposition  Condition    Discharge Stable             Follow-up Information       [facility] - Emergency Dept.    Specialty: Emergency Medicine  Why: If symptoms worsen  Contact information:  [address]  [facility] [id number] [phone]                            ED Prescriptions    None       Follow-up Information       Follow up With Specialties Details Why Contact Info    [facility] - Emergency Dept Emergency Medicine  If symptoms worsen [address]  [facility] [id number] [phone]             Vicente iSmpson DO  11/27/22 1949

## 2022-11-28 NOTE — TELEPHONE ENCOUNTER
----- Message from Dong Vogt MD sent at 11/28/2022  9:44 AM CST -----  Urine culture reviewed, less than 10,000 colony-forming units of Klebsiella send those sensitivities were performed.  Patient currently on antibiotics can you inquire as to her symptoms and if they are resolving or not  ----- Message -----  From: Pascual Causey MA  Sent: 11/28/2022   9:19 AM CST  To: Dong Vogt MD    Please advise on UA. I do not see where pt was called. MAIN pt was seen at hospital yesterday for age related cognitive disorder  ----- Message -----  From: Bernardo Moreno  Sent: 11/25/2022  10:32 AM CST  To: Milo [facility]man[facility] Staff    .Type:  Needs Medical Advice    Who Called: Yvonne pt's daughter    Symptoms (please be specific):    How long has patient had these symptoms:    Pharmacy name and phone #:    Would the patient rather a call back or a response via [facility]?   Best Call Back Number:   Additional Information: I sent a page to Dr. Warner at 10:24 am on Friday, 11/25/2022 requesting a call back to the daughter Yvonne. She told me she had talked to Dr. Warner yesterday and was instructed to call back today to get results of a urine culture that was done for her mother.

## 2022-11-28 NOTE — TELEPHONE ENCOUNTER
Spoke to pt's daughter Yvonne and she stated that pt is feeling better.  Daughter also believes that the reason for the ED visit and pt's BP being high has to do with pt possibly having sleep apnea, and would like to discuss this further at next visit on 12/18/22

## 2022-12-01 PROBLEM — I73.9 PAD (PERIPHERAL ARTERY DISEASE): Status: ACTIVE | Noted: 2022-01-01

## 2022-12-01 PROBLEM — Z79.899 HIGH RISK MEDICATION USE: Status: ACTIVE | Noted: 2022-01-01

## 2022-12-01 PROBLEM — I48.91 ATRIAL FIBRILLATION WITH NORMAL VENTRICULAR RATE: Status: ACTIVE | Noted: 2022-01-01

## 2022-12-01 NOTE — TELEPHONE ENCOUNTER
----- Message from Charan Ismael sent at 12/1/2022  4:37 PM CST -----  Regarding: pt call  Type:  Patient Call    Who Called: Yvonne - Daughter   Who Left Message for Patient: nurse / Dr. SERRA  Does the patient know what this is regarding?: yes  Would the patient rather a call back or a response via [facility]? Call back  Best Call Back Number: [phone]  Additional Information:  left some papers at the  with staff and was wanting to know if Dr. SERRA reviewed them?

## 2022-12-01 NOTE — PROGRESS NOTES
Subjective:      Patient ID: Ally Valdez is a 87 y.o. female.    Chief Complaint: Follow-up      HPI:  87 year old female here for 3 month revisit  History includes a fib on eliquis, PVD  Dr. Quiroz- podiatrist, history ulceration on left toe  Lived independently till 2019  Patient with peripheral vascular disease with evidence of chronic venous stasis and brawny edema.   Dr. Veloz- cardiology- a. fib,pvd, murmur. on eliquis  3/24 PTA with stent right leg  LE Arterial US 2021-The bilateral posterior tibial, left anterior tibial, and left dorsalis pedis arteries are totally occluded. 50 - 75% stenosis detected in the left popliteal artery.  Echo 2021- severely dialted atrium, EF 55-60%; diastolic dysfunction  Declines vaccines  Amin next week  Dejuan on          Past Medical History:  Past Medical History:   Diagnosis Date    A-fib     Alzheimer disease     Dementia     Foot ulcer, left     Hypertension     Paroxysmal atrial fibrillation     PVD (peripheral vascular disease)      Past Surgical History:   Procedure Laterality Date     SECTION  1973    Stent placement right thigh  2021     Review of patient's allergies indicates:   Allergen Reactions    Lisinopril Swelling    Fluconazole      Drug interaction w/Eliquis     Current Outpatient Medications on File Prior to Visit   Medication Sig Dispense Refill    ascorbic acid, vitamin C, (VITAMIN C) 1000 MG tablet Take 1,000 mg by mouth once daily.      aspirin (ECOTRIN) 81 MG EC tablet Take 81 mg by mouth 3 (three) times a week.      B.coagul,subtilis/inulin/vit C (UP4 PROBIOTICS PLUS PREBIOTIC ORAL) Take by mouth once daily.      cholecalciferol, vitamin D3, 125 mcg (5,000 unit) Tab Take 5,000 Units by mouth once daily.      cranberry fruit concentrate (CRAN- MG ORAL) Take by mouth once daily.      diclofenac sodium (VOLTAREN) 1 % Gel APPLY 1 APPLICATION TOPICALLY 4 TIMES A DAY AS DIRECTED FOR 30 DAYS      ELDERBERRY FRUIT  "ORAL Take by mouth once daily.      ELIQUIS 2.5 mg Tab Take 2.5 mg by mouth 2 (two) times daily.      lactulose (CHRONULAC) 10 gram/15 mL solution Take 15 mLs (10 g total) by mouth 3 (three) times daily. 1892 mL 6    NYSTOP powder Apply topically 2 (two) times daily. Apply to affected area 60 g 2    trolamine salicylate (ASPERCREME) 10 % cream Apply topically as needed.      zinc acetate 50 mg (zinc) Cap Take by mouth once daily.      [DISCONTINUED] triamcinolone acetonide 0.1% (KENALOG) 0.1 % cream Apply topically 2 (two) times daily as needed (eczema). 80 g 1     No current facility-administered medications on file prior to visit.     Social History     Socioeconomic History    Marital status:    Tobacco Use    Smoking status: Never    Smokeless tobacco: Never   Substance and Sexual Activity    Alcohol use: Never    Drug use: Never    Sexual activity: Not Currently     Partners: Male     Birth control/protection: None     Family History   Problem Relation Age of Onset    Cancer Mother     Alzheimer's disease Father        Review of Systems  A comprehensive review of systems was performed and was negative with exception of what is documented above.     Objective:   /68 (BP Location: Right arm, Patient Position: Sitting, BP Method: Medium (Manual))   Pulse 82   Temp 97.6 °F (36.4 °C) (Temporal)   Resp 16   Ht 5' 4" (1.626 m)   Wt 70.1 kg (154 lb 9.6 oz)   SpO2 99%   BMI 26.54 kg/m²   Physical Exam  General : Alert and oriented, No acute distress, afebrile.  Eye : PERRLA. EOMI. Normal conjunctiva, Sclerae are nonicteric.   Respiratory : Respirations are non-labored and clear to auscultation bilaterally. Symmetrical air entry bilaterally, no crackles, no wheezes, no rhonchi. No cyanosis, no clubbing.  Cardiovascular : Normal rate, Regular rhythm. No murmurs, rubs, or gallops. Pulses are 2+ throughout. No JVD. No Edema.  Gastrointestinal : Soft, nontender, non-distended, bowel sounds are present in " all quadrants, no organomegaly, no guarding, no rebound.  Musculoskeletal : Normal range of motion throughout. No muscle tenderness.  Integumentary : Warm, moist, intact. Chronic wound noted to the 3rd phalanx of the left foot/ hammer toe  Neurologic : Alert, Oriented  Psychiatric : Cooperative, Appropriate mood & affect.   Assessment/ Plan:   1. Eczema, unspecified type  -     triamcinolone acetonide 0.1% (KENALOG) 0.1 % cream; Apply topically 2 (two) times daily as needed (eczema).  Dispense: 80 g; Refill: 1    2. Vascular dementia with behavioral disturbance  Assessment & Plan:   Stable, continue current      3. Chronic kidney disease, stage 3a  Assessment & Plan:   Labs reviewed and stable.    Continue current management      4. Type 2 diabetes mellitus with other circulatory complications  Assessment & Plan:   Stable, continue current management    Orders:  -     CBC Auto Differential; Future; Expected date: 03/01/2023  -     Comprehensive Metabolic Panel; Future; Expected date: 03/01/2023  -     Lipid Panel; Future; Expected date: 03/01/2023  -     TSH; Future; Expected date: 03/01/2023  -     Hemoglobin A1C; Future; Expected date: 03/01/2023  -     Urinalysis; Future; Expected date: 03/01/2023  -     Microalbumin/Creatinine Ratio, Urine; Future; Expected date: 03/01/2023    5. PAD (peripheral artery disease)  Assessment & Plan:   Followed by Dr. Veloz   clinically stable, no new wounds.      6. Atrial fibrillation with normal ventricular rate  Assessment & Plan:    Rate controlled and anticoagulated  Continue current management    Orders:  -     CBC Auto Differential; Future; Expected date: 03/01/2023  -     Comprehensive Metabolic Panel; Future; Expected date: 03/01/2023  -     Lipid Panel; Future; Expected date: 03/01/2023  -     TSH; Future; Expected date: 03/01/2023  -     Hemoglobin A1C; Future; Expected date: 03/01/2023  -     Urinalysis; Future; Expected date: 03/01/2023  -     Microalbumin/Creatinine  Ratio, Urine; Future; Expected date: 03/01/2023           Follow up in about 3 months (around 3/1/2023) for Medicare Wellness, with labs prior to visit, TELEMED.

## 2022-12-06 NOTE — TELEPHONE ENCOUNTER
----- Message from Dong Vogt MD sent at 12/6/2022 11:04 AM CST -----  Home health can collect a urine, we need to ensure its from the urethra and not vaginal  ----- Message -----  From: Jannette Avilezivetkrystal  Sent: 12/6/2022   9:30 AM CST  To: Dong Vogt MD    Pt's daughter called , pt started passing Blood in her Urine last night.  Home Health is going there around 11a.m.  Pt's daughter wanted to know if you want any testing (Labs, U/A) done for Home Health to do.  Blood is Bright Red and Moderate  Daughter was not sure of the protocol for Home Health to draw any testing if needed.

## 2022-12-09 NOTE — TELEPHONE ENCOUNTER
----- Message from Charan Guevara sent at 12/9/2022 10:17 AM CST -----  Regarding: Patient Call  Type:  Patient Call    Who Called: Daughter - Yvonne Valdez  Who Left Message for Patient: Nurse  Does the patient know what this is regarding?: Yes  Would the patient rather a call back or a response via [facility]? Call  Best Call Back Number: [phone]  Additional Information: Requesting a call back

## 2022-12-16 NOTE — TELEPHONE ENCOUNTER
----- Message from Charan Guevara sent at 12/16/2022  9:40 AM CST -----  Regarding: Pt Call  Type:  Patient Call    Who Called: [facility]  Who Left Message for Patient: Nurse  Does the patient know what this is regarding?: Yes  Would the patient rather a call back or a response via [facility]? Call back   Best Call Back Number: [phone]  Additional Information:  [facility] stated they don't do flu and covid shots there

## 2022-12-16 NOTE — TELEPHONE ENCOUNTER
Patients daughter Yvonne stated she is feeling better, thinks its just allergies. Instructed her to give plenty of water and take Coricidin HBP OTC if needed.

## 2022-12-19 NOTE — TELEPHONE ENCOUNTER
----- Message from Dong Vogt MD sent at 12/19/2022  8:40 AM CST -----  Your urine test is normal have   Continue current treatment plan and follow up as scheduled.

## 2022-12-22 NOTE — ED PROVIDER NOTES
Encounter Date: 2022       History     Chief Complaint   Patient presents with    Weakness     Pt to er c/o no appetite, weakness and increased sleeping onset one week ago.     HPI    87-year-old female with a past medical history as delineated below presents emergency department for generalized weakness and fatigue and decreased appetite.  Daughter states that she was recently treated for urinary tract infection with ciprofloxacin the finished 10 days ago.  States that these her usual symptoms that she gets when she has a urinary tract infection.  Patient denies any complaints currently.  Daughter states that she is having more difficulty with ambulating and needing more assistance.    Review of patient's allergies indicates:   Allergen Reactions    Lisinopril Swelling    Fluconazole      Drug interaction w/Eliquis     Past Medical History:   Diagnosis Date    A-fib     Alzheimer disease     Dementia     Foot ulcer, left     Hypertension     Paroxysmal atrial fibrillation     PVD (peripheral vascular disease)      Past Surgical History:   Procedure Laterality Date     SECTION  1973    Stent placement right thigh  2021     Family History   Problem Relation Age of Onset    Cancer Mother     Alzheimer's disease Father      Social History     Tobacco Use    Smoking status: Never    Smokeless tobacco: Never   Substance Use Topics    Alcohol use: Never    Drug use: Never     Review of Systems   Constitutional:  Positive for activity change, appetite change and fatigue. Negative for fever.   Respiratory:  Negative for cough and shortness of breath.    Cardiovascular:  Negative for chest pain.   Gastrointestinal:  Negative for abdominal pain.   Genitourinary:  Negative for dysuria.   All other systems reviewed and are negative.    Physical Exam     Initial Vitals [22 1251]   BP Pulse Resp Temp SpO2   139/76 74 (!) 22 96.8 °F (36 °C) 98 %      MAP       --         Physical Exam    Nursing note and  vitals reviewed.  Constitutional: She appears well-developed and well-nourished. No distress.   Cardiovascular:  Normal rate. An irregularly irregular rhythm present.           Pulmonary/Chest: Breath sounds normal. No respiratory distress.   Abdominal: Abdomen is soft. Bowel sounds are normal. There is no abdominal tenderness.   Musculoskeletal:         General: Normal range of motion.     Neurological: She is alert and oriented to person, place, and time. She has normal strength. No cranial nerve deficit. GCS score is 15. GCS eye subscore is 4. GCS verbal subscore is 5. GCS motor subscore is 6.   Skin: Skin is warm. Capillary refill takes less than 2 seconds.   Psychiatric: She has a normal mood and affect. Thought content normal.       ED Course   Procedures  Labs Reviewed   COMPREHENSIVE METABOLIC PANEL - Abnormal; Notable for the following components:       Result Value    Glucose Level 119 (*)     Blood Urea Nitrogen 20.9 (*)     Globulin 3.9 (*)     Albumin/Globulin Ratio 0.9 (*)     All other components within normal limits   COVID/FLU A&B PCR - Abnormal; Notable for the following components:    SARS-CoV-2 PCR Detected (*)     All other components within normal limits    Narrative:     The Xpert Xpress SARS-CoV-2/FLU/RSV plus is a rapid, multiplexed real-time PCR test intended for the simultaneous qualitative detection and differentiation of SARS-CoV-2, Influenza A, Influenza B, and respiratory syncytial virus (RSV) viral RNA in either nasopharyngeal swab or nasal swab specimens.         URINALYSIS, REFLEX TO URINE CULTURE - Abnormal; Notable for the following components:    Protein, UA 30 (*)     Ketones, UA Trace (*)     Blood, UA Trace (*)     All other components within normal limits   CBC WITH DIFFERENTIAL - Abnormal; Notable for the following components:    Neut # 1.85 (*)     All other components within normal limits   URINALYSIS, MICROSCOPIC - Abnormal; Notable for the following components:     Bacteria, UA Trace (*)     Yeast, UA Rare (*)     RBC, UA 6-10 (*)     Squamous Epithelial Cells, UA Many (*)     All other components within normal limits   MAGNESIUM - Normal   TROPONIN I - Normal   TSH - Normal   CBC W/ AUTO DIFFERENTIAL    Narrative:     The following orders were created for panel order CBC auto differential.  Procedure                               Abnormality         Status                     ---------                               -----------         ------                     CBC with Differential[[id number]]        Abnormal            Final result                 Please view results for these tests on the individual orders.     EKG Readings: (Independently Interpreted)   Initial Reading: No STEMI. Rhythm: Atrial Fibrillation. Heart Rate: 81. Ectopy: No Ectopy. Conduction: RBBB. ST Segments: Normal ST Segments. T Waves: Normal. Clinical Impression: Atrial Fibrillation with RBBB     Imaging Results    None          Medications - No data to display  Medical Decision Making:   Differential Diagnosis:   Urinary tract infection, viral syndrome, metabolic derangement, dehydration, physical deconditioning           ED Course as of 12/22/22 1612   u Dec 22, 2022   1608 SARS-CoV2 (COVID-19) Qualitative PCR(!): Detected [BS]   1611 Family understands diagnosis.  Will discharge.  ER precautions given. [BS]      ED Course User Index  [BS] David Benavides MD                 Clinical Impression:   Final diagnoses:  [R53.1] Weakness  [U07.1] COVID-19 (Primary)        ED Disposition Condition    Discharge Stable          ED Prescriptions    None       Follow-up Information       Follow up With Specialties Details Why Contact Info    Dong Vogt MD Internal Medicine Schedule an appointment as soon as possible for a visit   [address] [place] [address] [id number] [phone]      [facility] - Emergency Dept Emergency Medicine Go to  If symptoms worsen [id number]  [facility] [id number]  [phone]             David Benavides MD  12/22/22 2617

## 2022-12-28 NOTE — ASSESSMENT & PLAN NOTE
Would benefit from both the use of a home hospital bed as well as walker.  Increase PT/OT - order for evaluation/treatment placed

## 2022-12-28 NOTE — PROGRESS NOTES
Patient ID: [id number]     Chief Complaint: Orders (Orders for Hospital bed)      HPI:     This is a telemedicine note. Patient was treated using telemedicine, real time audio and video, according to [facility] protocols. Malachi ESCAMILLA, conducted the visit from the [facility]. The patient participated in the visit at a non-[facility] location selected by the patient, identified below. I am licensed in the state where the patient stated they are located. The patient stated that they understood and accepted the privacy and security risks to their information at their location. This visit is not recorded.    Patient was located at the patient's home.       Ally Valdez is a 87 y.o. female here today for a telemedicine visit. Accompanied by her daughter. Tested positive for Covid 22. Reports mild symptoms. Denies SOB, CP, difficulty breathing, fever, body aches, chills. She is requesting a script for a walker and hospital bed for home as she's had some deconditioning since Covid. Having some difficulty with ambulating and tranfers. Getting PT [facility] with [facility] but daughters feel like she would benefit from more frequent therapy to get her back to baseline. She is a fall risk given her dementia coupled with OAC use.  No other complaints today.       ----------------------------  Alzheimer disease  Dementia  Foot ulcer, left  Hypertension  Paroxysmal atrial fibrillation  PVD (peripheral vascular disease)     Past Surgical History:   Procedure Laterality Date     SECTION  1973    Stent placement right thigh  2021       Review of patient's allergies indicates:   Allergen Reactions    Lisinopril Swelling    Fluconazole      Drug interaction w/Eliquis       Outpatient Medications Marked as Taking for the 22 encounter (Office Visit) with JUSTIN Caldwell   Medication Sig Dispense Refill    ascorbic acid, vitamin C, (VITAMIN C) 1000 MG tablet Take 1,000 mg by mouth  once daily.      aspirin (ECOTRIN) 81 MG EC tablet Take 81 mg by mouth 3 (three) times a week.      B.coagul,subtilis/inulin/vit C (UP4 PROBIOTICS PLUS PREBIOTIC ORAL) Take by mouth once daily.      cholecalciferol, vitamin D3, 125 mcg (5,000 unit) Tab Take 5,000 Units by mouth once daily.      cranberry fruit concentrate (CRAN- MG ORAL) Take by mouth once daily.      diclofenac sodium (VOLTAREN) 1 % Gel APPLY 1 APPLICATION TOPICALLY 4 TIMES A DAY AS DIRECTED FOR 30 DAYS      ELDERBERRY FRUIT ORAL Take by mouth once daily.      ELIQUIS 2.5 mg Tab Take 2.5 mg by mouth 2 (two) times daily.      lactulose (CHRONULAC) 10 gram/15 mL solution Take 15 mLs (10 g total) by mouth 3 (three) times daily. 1892 mL 6    nystatin (MYCOSTATIN) powder APPLY TO AFFECTED AREA TOPICALLY TWICE A DAY 60 g 2    triamcinolone acetonide 0.1% (KENALOG) 0.1 % cream Apply topically 2 (two) times daily as needed (eczema). 80 g 1    trolamine salicylate (ASPERCREME) 10 % cream Apply topically as needed.      zinc acetate 50 mg (zinc) Cap Take by mouth once daily.         Social History     Socioeconomic History    Marital status:    Tobacco Use    Smoking status: Never    Smokeless tobacco: Never   Substance and Sexual Activity    Alcohol use: Never    Drug use: Never    Sexual activity: Not Currently     Partners: Male     Birth control/protection: None        Family History   Problem Relation Age of Onset    Cancer Mother     Alzheimer's disease Father         Patient Care Team:  Dong Vogt MD as PCP - General (Internal Medicine)      Subjective:       ROS    See HPI for details    Constitutional: Denies Change in appetite. Denies Chills. Denies Fever. Denies Night sweats.  Eye: Denies Blurred vision. Denies Discharge. Denies Eye pain.  ENT: Denies Decreased hearing. Denies Sore throat. Denies Swollen glands.  Respiratory: Denies Cough. Denies Shortness of breath. Denies Shortness of breath with exertion. Denies  Wheezing.  Cardiovascular: DeniesChest pain at rest. Denies Chest pain with exertion. Denies Irregular heartbeat. Denies Palpitations. Denies Edema.  Gastrointestinal: Denies Abdominal pain. DeniesDiarrhea. Denies Nausea. Denies Vomiting. Denies Hematemesis or Hematochezia.  Genitourinary: Denies Dysuria. Denies Urinary frequency. Denies Urinary urgency. Denies Blood in urine.  Endocrine: Denies Cold intolerance. Denies Excessive thirst. Denies Heat intolerance. Denies Weight loss. Denies Weight gain.  Musculoskeletal: Denies Painful joints. Denies Weakness.  Integumentary: Denies Rash. Denies Itching. Denies Dry skin.  Neurologic: Denies Dizziness. Denies Fainting. Denies Headache.  Psychiatric: Denies Depression. Denies Anxiety. Denies Suicidal/Homicidal ideations.    All Other ROS: Negative except as stated in HPI.       Objective:     There were no vitals taken for this visit.    Physical Exam    Physical Exam: LIMITED DUE TO TELEMEDICINE RESTRICTIONS.  General: Alert and oriented, No acute distress.  Head: Normocephalic, Atraumatic.  Eye: Sclera non-icteric.  Neck/Thyroid:  Full range of motion.  Respiratory: Non-labored respirations, Symmetrical chest wall expansion.  Musculoskeletal: Normal range of motion.  Integumentary:  No visible suspicious lesions or rashes. No diaphoresis.   Neurologic: No focal deficits  Psychiatric: Normal interaction, Coherent speech, Euthymic mood, Appropriate affect       Assessment:       ICD-10-CM ICD-9-CM   1. Physical deconditioning  R53.81 799.3   2. Late onset Alzheimer's dementia without behavioral disturbance  G30.1 331.0    F02.80 294.10   3. Atrial fibrillation with normal ventricular rate  I48.91 427.31        Plan:     1. Physical deconditioning  Assessment & Plan:  Would benefit from both the use of a home hospital bed as well as walker.  Increase PT/OT - order for evaluation/treatment placed    Orders:  -     HOSPITAL BED FOR HOME USE  -     WALKER FOR HOME USE  -      Ambulatory referral/consult to Physical/Occupational Therapy; Future; Expected date: 01/04/2023    2. Late onset Alzheimer's dementia without behavioral disturbance  -     HOSPITAL BED FOR HOME USE  -     WALKER FOR HOME USE  -     Ambulatory referral/consult to Physical/Occupational Therapy; Future; Expected date: 01/04/2023    3. Atrial fibrillation with normal ventricular rate  -     HOSPITAL BED FOR HOME USE  -     WALKER FOR HOME USE  -     Ambulatory referral/consult to Physical/Occupational Therapy; Future; Expected date: 01/04/2023           Medication List with Changes/Refills   Current Medications    ASCORBIC ACID, VITAMIN C, (VITAMIN C) 1000 MG TABLET    Take 1,000 mg by mouth once daily.       Start Date: --        End Date: --    ASPIRIN (ECOTRIN) 81 MG EC TABLET    Take 81 mg by mouth 3 (three) times a week.       Start Date: --        End Date: --    B.COAGUL,SUBTILIS/INULIN/VIT C (UP4 PROBIOTICS PLUS PREBIOTIC ORAL)    Take by mouth once daily.       Start Date: --        End Date: --    CHOLECALCIFEROL, VITAMIN D3, 125 MCG (5,000 UNIT) TAB    Take 5,000 Units by mouth once daily.       Start Date: --        End Date: --    CIPROFLOXACIN HCL (CIPRO) 250 MG TABLET    Take 1 tablet (250 mg total) by mouth 2 (two) times daily.       Start Date: 12/7/2022 End Date: --    CRANBERRY FRUIT CONCENTRATE (CRAN- MG ORAL)    Take by mouth once daily.       Start Date: --        End Date: --    DICLOFENAC SODIUM (VOLTAREN) 1 % GEL    APPLY 1 APPLICATION TOPICALLY 4 TIMES A DAY AS DIRECTED FOR 30 DAYS       Start Date: 7/11/2022 End Date: --    ELDERBERRY FRUIT ORAL    Take by mouth once daily.       Start Date: --        End Date: --    ELIQUIS 2.5 MG TAB    Take 2.5 mg by mouth 2 (two) times daily.       Start Date: 6/3/2022  End Date: --    LACTULOSE (CHRONULAC) 10 GRAM/15 ML SOLUTION    Take 15 mLs (10 g total) by mouth 3 (three) times daily.       Start Date: 9/7/2022  End Date: --    NYSTATIN  (MYCOSTATIN) POWDER    APPLY TO AFFECTED AREA TOPICALLY TWICE A DAY       Start Date: 12/19/2022End Date: --    TRIAMCINOLONE ACETONIDE 0.1% (KENALOG) 0.1 % CREAM    Apply topically 2 (two) times daily as needed (eczema).       Start Date: 12/1/2022 End Date: --    TROLAMINE SALICYLATE (ASPERCREME) 10 % CREAM    Apply topically as needed.       Start Date: --        End Date: --    ZINC ACETATE 50 MG (ZINC) CAP    Take by mouth once daily.       Start Date: --        End Date: --          In addition to their scheduled follow up, the patient has also been instructed to follow up on as needed basis.       Video Time Documentation:  Spent 15 minutes with patient face to face discussed health concerns. More than 50% of this time was spent in counseling and coordination of care.

## 2023-01-01 ENCOUNTER — TELEPHONE (OUTPATIENT)
Dept: INTERNAL MEDICINE | Facility: CLINIC | Age: 88
End: 2023-01-01
Payer: MEDICARE

## 2023-01-01 ENCOUNTER — LAB REQUISITION (OUTPATIENT)
Dept: LAB | Facility: HOSPITAL | Age: 88
End: 2023-01-01
Payer: MEDICARE

## 2023-01-01 ENCOUNTER — TELEPHONE (OUTPATIENT)
Dept: NEUROLOGY | Facility: CLINIC | Age: 88
End: 2023-01-01
Payer: MEDICARE

## 2023-01-01 ENCOUNTER — TELEPHONE (OUTPATIENT)
Dept: INTERNAL MEDICINE | Facility: CLINIC | Age: 88
End: 2023-01-01

## 2023-01-01 ENCOUNTER — DOCUMENT SCAN (OUTPATIENT)
Dept: HOME HEALTH SERVICES | Facility: HOSPITAL | Age: 88
End: 2023-01-01
Payer: MEDICARE

## 2023-01-01 ENCOUNTER — PATIENT OUTREACH (OUTPATIENT)
Dept: HOME HEALTH SERVICES | Facility: HOSPITAL | Age: 88
End: 2023-01-01
Payer: MEDICARE

## 2023-01-01 ENCOUNTER — HOSPITAL ENCOUNTER (EMERGENCY)
Facility: HOSPITAL | Age: 88
Discharge: HOME | End: 2023-04-18
Attending: EMERGENCY MEDICINE
Payer: MEDICARE

## 2023-01-01 ENCOUNTER — OFFICE VISIT (OUTPATIENT)
Dept: INTERNAL MEDICINE | Facility: CLINIC | Age: 88
End: 2023-01-01
Payer: MEDICARE

## 2023-01-01 ENCOUNTER — OUTPATIENT CASE MANAGEMENT (OUTPATIENT)
Dept: ADMINISTRATIVE | Facility: OTHER | Age: 88
End: 2023-01-01
Payer: MEDICARE

## 2023-01-01 ENCOUNTER — HOSPITAL ENCOUNTER (EMERGENCY)
Facility: HOSPITAL | Age: 88
Discharge: NURSING, REHAB OR LONG-TERM CARE | End: 2023-07-23
Attending: EMERGENCY MEDICINE
Payer: MEDICARE

## 2023-01-01 ENCOUNTER — EXTERNAL HOSPITAL ADMISSION (OUTPATIENT)
Dept: ADMINISTRATIVE | Facility: CLINIC | Age: 88
End: 2023-01-01
Payer: MEDICARE

## 2023-01-01 ENCOUNTER — EXTERNAL HOME HEALTH (OUTPATIENT)
Dept: HOME HEALTH SERVICES | Facility: HOSPITAL | Age: 88
End: 2023-01-01
Payer: MEDICARE

## 2023-01-01 ENCOUNTER — PATIENT MESSAGE (OUTPATIENT)
Dept: INTERNAL MEDICINE | Facility: CLINIC | Age: 88
End: 2023-01-01
Payer: MEDICARE

## 2023-01-01 ENCOUNTER — PATIENT MESSAGE (OUTPATIENT)
Dept: RESEARCH | Facility: HOSPITAL | Age: 88
End: 2023-01-01
Payer: MEDICARE

## 2023-01-01 ENCOUNTER — NURSE TRIAGE (OUTPATIENT)
Dept: ADMINISTRATIVE | Facility: CLINIC | Age: 88
End: 2023-01-01
Payer: MEDICARE

## 2023-01-01 ENCOUNTER — PATIENT OUTREACH (OUTPATIENT)
Dept: ADMINISTRATIVE | Facility: CLINIC | Age: 88
End: 2023-01-01
Payer: MEDICARE

## 2023-01-01 ENCOUNTER — OFFICE VISIT (OUTPATIENT)
Dept: NEUROLOGY | Facility: CLINIC | Age: 88
End: 2023-01-01
Payer: MEDICARE

## 2023-01-01 ENCOUNTER — HOSPITAL ENCOUNTER (INPATIENT)
Facility: HOSPITAL | Age: 88
LOS: 1 days | Discharge: HOSPICE | DRG: 057 | End: 2023-11-09
Attending: EMERGENCY MEDICINE | Admitting: INTERNAL MEDICINE
Payer: MEDICARE

## 2023-01-01 ENCOUNTER — HOSPITAL ENCOUNTER (INPATIENT)
Facility: HOSPITAL | Age: 88
LOS: 1 days | Discharge: HOME HEALTH | DRG: 312 | End: 2023-04-14
Attending: STUDENT IN AN ORGANIZED HEALTH CARE EDUCATION/TRAINING PROGRAM | Admitting: INTERNAL MEDICINE
Payer: MEDICARE

## 2023-01-01 VITALS
RESPIRATION RATE: 18 BRPM | WEIGHT: 151 LBS | BODY MASS INDEX: 26.75 KG/M2 | SYSTOLIC BLOOD PRESSURE: 132 MMHG | HEART RATE: 75 BPM | OXYGEN SATURATION: 99 % | HEIGHT: 63 IN | TEMPERATURE: 98 F | DIASTOLIC BLOOD PRESSURE: 79 MMHG

## 2023-01-01 VITALS
BODY MASS INDEX: 25.76 KG/M2 | DIASTOLIC BLOOD PRESSURE: 63 MMHG | RESPIRATION RATE: 20 BRPM | SYSTOLIC BLOOD PRESSURE: 117 MMHG | WEIGHT: 140 LBS | HEIGHT: 62 IN | OXYGEN SATURATION: 99 % | TEMPERATURE: 98 F | HEART RATE: 72 BPM

## 2023-01-01 VITALS
RESPIRATION RATE: 16 BRPM | HEIGHT: 62 IN | DIASTOLIC BLOOD PRESSURE: 82 MMHG | SYSTOLIC BLOOD PRESSURE: 128 MMHG | OXYGEN SATURATION: 96 % | WEIGHT: 142 LBS | BODY MASS INDEX: 26.13 KG/M2 | TEMPERATURE: 98 F | HEART RATE: 42 BPM

## 2023-01-01 VITALS — SYSTOLIC BLOOD PRESSURE: 122 MMHG | DIASTOLIC BLOOD PRESSURE: 81 MMHG | HEART RATE: 76 BPM

## 2023-01-01 VITALS — BODY MASS INDEX: 28.52 KG/M2 | HEIGHT: 62 IN | WEIGHT: 155 LBS

## 2023-01-01 VITALS
SYSTOLIC BLOOD PRESSURE: 120 MMHG | HEART RATE: 90 BPM | TEMPERATURE: 97 F | DIASTOLIC BLOOD PRESSURE: 89 MMHG | OXYGEN SATURATION: 100 % | RESPIRATION RATE: 15 BRPM

## 2023-01-01 VITALS
OXYGEN SATURATION: 99 % | DIASTOLIC BLOOD PRESSURE: 82 MMHG | SYSTOLIC BLOOD PRESSURE: 126 MMHG | RESPIRATION RATE: 18 BRPM | WEIGHT: 154.63 LBS | HEART RATE: 77 BPM | TEMPERATURE: 97 F | BODY MASS INDEX: 27.39 KG/M2

## 2023-01-01 DIAGNOSIS — R63.0 POOR APPETITE: ICD-10-CM

## 2023-01-01 DIAGNOSIS — R55 SYNCOPE, UNSPECIFIED SYNCOPE TYPE: Primary | ICD-10-CM

## 2023-01-01 DIAGNOSIS — E03.9 HYPOTHYROIDISM, UNSPECIFIED: ICD-10-CM

## 2023-01-01 DIAGNOSIS — R19.7 DIARRHEA, UNSPECIFIED: ICD-10-CM

## 2023-01-01 DIAGNOSIS — E87.20 LACTIC ACIDOSIS: ICD-10-CM

## 2023-01-01 DIAGNOSIS — F01.518 VASCULAR DEMENTIA WITH BEHAVIORAL DISTURBANCE: Primary | ICD-10-CM

## 2023-01-01 DIAGNOSIS — N18.31 CHRONIC KIDNEY DISEASE, STAGE 3A: ICD-10-CM

## 2023-01-01 DIAGNOSIS — R79.9 ABNORMAL FINDING OF BLOOD CHEMISTRY, UNSPECIFIED: ICD-10-CM

## 2023-01-01 DIAGNOSIS — I73.9 PERIPHERAL VASCULAR DISEASE: ICD-10-CM

## 2023-01-01 DIAGNOSIS — M79.672 LEFT FOOT PAIN: ICD-10-CM

## 2023-01-01 DIAGNOSIS — R55 SYNCOPE, UNSPECIFIED SYNCOPE TYPE: ICD-10-CM

## 2023-01-01 DIAGNOSIS — F02.80 LATE ONSET ALZHEIMER'S DEMENTIA WITHOUT BEHAVIORAL DISTURBANCE: ICD-10-CM

## 2023-01-01 DIAGNOSIS — R53.1 GENERALIZED WEAKNESS: ICD-10-CM

## 2023-01-01 DIAGNOSIS — Z91.89 AT HIGH RISK FOR INADEQUATE NUTRITIONAL INTAKE: ICD-10-CM

## 2023-01-01 DIAGNOSIS — G30.1 LATE ONSET ALZHEIMER'S DEMENTIA WITHOUT BEHAVIORAL DISTURBANCE: ICD-10-CM

## 2023-01-01 DIAGNOSIS — F03.90 DEMENTIA, UNSPECIFIED DEMENTIA SEVERITY, UNSPECIFIED DEMENTIA TYPE, UNSPECIFIED WHETHER BEHAVIORAL, PSYCHOTIC, OR MOOD DISTURBANCE OR ANXIETY: ICD-10-CM

## 2023-01-01 DIAGNOSIS — R53.81 PHYSICAL DECONDITIONING: ICD-10-CM

## 2023-01-01 DIAGNOSIS — R53.1 WEAKNESS: ICD-10-CM

## 2023-01-01 DIAGNOSIS — R55 SYNCOPE: Primary | ICD-10-CM

## 2023-01-01 DIAGNOSIS — R55 SYNCOPE, UNSPECIFIED SYNCOPE TYPE: Chronic | ICD-10-CM

## 2023-01-01 DIAGNOSIS — G54.7 PHANTOM LIMB: ICD-10-CM

## 2023-01-01 DIAGNOSIS — G54.7 PHANTOM LIMB: Primary | ICD-10-CM

## 2023-01-01 DIAGNOSIS — G30.1 LATE ONSET ALZHEIMER'S DISEASE WITHOUT BEHAVIORAL DISTURBANCE: Primary | Chronic | ICD-10-CM

## 2023-01-01 DIAGNOSIS — I48.0 PAROXYSMAL ATRIAL FIBRILLATION: ICD-10-CM

## 2023-01-01 DIAGNOSIS — I73.9 PERIPHERAL VASCULAR DISEASE: Primary | ICD-10-CM

## 2023-01-01 DIAGNOSIS — I70.25 ATHEROSCLEROSIS OF NATIVE ARTERIES OF OTHER EXTREMITIES WITH ULCERATION: ICD-10-CM

## 2023-01-01 DIAGNOSIS — I48.91 ATRIAL FIBRILLATION BY ELECTROCARDIOGRAM: Primary | ICD-10-CM

## 2023-01-01 DIAGNOSIS — R55 VASOVAGAL SYNCOPE: Chronic | ICD-10-CM

## 2023-01-01 DIAGNOSIS — F03.90 DEMENTIA, UNSPECIFIED DEMENTIA SEVERITY, UNSPECIFIED DEMENTIA TYPE, UNSPECIFIED WHETHER BEHAVIORAL, PSYCHOTIC, OR MOOD DISTURBANCE OR ANXIETY: Primary | ICD-10-CM

## 2023-01-01 DIAGNOSIS — I48.20 CHRONIC ATRIAL FIBRILLATION: ICD-10-CM

## 2023-01-01 DIAGNOSIS — F02.80 LATE ONSET ALZHEIMER'S DISEASE WITHOUT BEHAVIORAL DISTURBANCE: Primary | Chronic | ICD-10-CM

## 2023-01-01 DIAGNOSIS — N39.0 URINARY TRACT INFECTION WITHOUT HEMATURIA, SITE UNSPECIFIED: ICD-10-CM

## 2023-01-01 LAB
ALBUMIN SERPL-MCNC: 2.6 G/DL (ref 3.4–4.8)
ALBUMIN SERPL-MCNC: 2.8 G/DL (ref 3.4–4.8)
ALBUMIN SERPL-MCNC: 2.9 G/DL (ref 3.4–4.8)
ALBUMIN SERPL-MCNC: 3.1 G/DL (ref 3.4–4.8)
ALBUMIN SERPL-MCNC: 3.1 G/DL (ref 3.4–4.8)
ALBUMIN SERPL-MCNC: 3.8 G/DL (ref 3.4–4.8)
ALBUMIN/GLOB SERPL: 0.7 RATIO (ref 1.1–2)
ALBUMIN/GLOB SERPL: 0.8 RATIO (ref 1.1–2)
ALBUMIN/GLOB SERPL: 0.9 RATIO (ref 1.1–2)
ALBUMIN/GLOB SERPL: 1.1 RATIO (ref 1.1–2)
ALP SERPL-CCNC: 66 UNIT/L (ref 40–150)
ALP SERPL-CCNC: 71 UNIT/L (ref 40–150)
ALP SERPL-CCNC: 76 UNIT/L (ref 40–150)
ALP SERPL-CCNC: 83 UNIT/L (ref 40–150)
ALP SERPL-CCNC: 86 UNIT/L (ref 40–150)
ALP SERPL-CCNC: 88 UNIT/L (ref 40–150)
ALP SERPL-CCNC: 90 UNIT/L (ref 40–150)
ALP SERPL-CCNC: 92 UNIT/L (ref 40–150)
ALP SERPL-CCNC: 94 UNIT/L (ref 40–150)
ALT SERPL-CCNC: 11 UNIT/L (ref 0–55)
ALT SERPL-CCNC: 18 UNIT/L (ref 0–55)
ALT SERPL-CCNC: 23 UNIT/L (ref 0–55)
ALT SERPL-CCNC: 24 UNIT/L (ref 0–55)
ALT SERPL-CCNC: 28 UNIT/L (ref 0–55)
ALT SERPL-CCNC: 31 UNIT/L (ref 0–55)
ALT SERPL-CCNC: 35 UNIT/L (ref 0–55)
ALT SERPL-CCNC: 36 UNIT/L (ref 0–55)
ALT SERPL-CCNC: 40 UNIT/L (ref 0–55)
AMPHET UR QL SCN: NEGATIVE
ANION GAP SERPL CALC-SCNC: 10 MEQ/L
ANION GAP SERPL CALC-SCNC: 11 MEQ/L
ANION GAP SERPL CALC-SCNC: 12 MEQ/L
ANION GAP SERPL CALC-SCNC: 12 MEQ/L
ANION GAP SERPL CALC-SCNC: 13 MEQ/L
ANION GAP SERPL CALC-SCNC: 13 MEQ/L
ANION GAP SERPL CALC-SCNC: 15 MEQ/L
ANION GAP SERPL CALC-SCNC: 6 MEQ/L
APPEARANCE UR: ABNORMAL
APPEARANCE UR: CLEAR
APTT PPP: 40.8 SECONDS (ref 23.2–33.7)
AST SERPL-CCNC: 13 UNIT/L (ref 5–34)
AST SERPL-CCNC: 15 UNIT/L (ref 5–34)
AST SERPL-CCNC: 16 UNIT/L (ref 5–34)
AST SERPL-CCNC: 18 UNIT/L (ref 5–34)
AST SERPL-CCNC: 18 UNIT/L (ref 5–34)
AST SERPL-CCNC: 19 UNIT/L (ref 5–34)
AST SERPL-CCNC: 21 UNIT/L (ref 5–34)
AST SERPL-CCNC: 26 UNIT/L (ref 5–34)
AST SERPL-CCNC: 31 UNIT/L (ref 5–34)
B PERT.PT PRMT NPH QL NAA+NON-PROBE: NOT DETECTED
BACTERIA #/AREA URNS AUTO: ABNORMAL /HPF
BACTERIA #/AREA URNS AUTO: NORMAL /HPF
BACTERIA BLD CULT: NORMAL
BACTERIA SPEC CULT: ABNORMAL
BACTERIA UR CULT: ABNORMAL
BACTERIA UR CULT: ABNORMAL
BACTERIA UR CULT: NO GROWTH
BACTERIA UR CULT: NORMAL
BARBITURATE SCN PRESENT UR: NEGATIVE
BASOPHILS # BLD AUTO: 0.01 X10(3)/MCL
BASOPHILS # BLD AUTO: 0.02 X10(3)/MCL
BASOPHILS # BLD AUTO: 0.02 X10(3)/MCL (ref 0–0.2)
BASOPHILS # BLD AUTO: 0.02 X10(3)/MCL (ref 0–0.2)
BASOPHILS # BLD AUTO: 0.03 X10(3)/MCL
BASOPHILS # BLD AUTO: 0.03 X10(3)/MCL (ref 0–0.2)
BASOPHILS # BLD AUTO: 0.04 X10(3)/MCL
BASOPHILS # BLD AUTO: 0.06 X10(3)/MCL
BASOPHILS NFR BLD AUTO: 0.1 %
BASOPHILS NFR BLD AUTO: 0.2 %
BASOPHILS NFR BLD AUTO: 0.3 %
BASOPHILS NFR BLD AUTO: 0.4 %
BASOPHILS NFR BLD AUTO: 0.5 %
BENZODIAZ UR QL SCN: NEGATIVE
BILIRUB SERPL-MCNC: 0.8 MG/DL
BILIRUB SERPL-MCNC: 0.9 MG/DL
BILIRUB UR QL STRIP.AUTO: NEGATIVE
BILIRUB UR QL STRIP.AUTO: NEGATIVE MG/DL
BILIRUBIN DIRECT+TOT PNL SERPL-MCNC: 0.5 MG/DL
BILIRUBIN DIRECT+TOT PNL SERPL-MCNC: 0.6 MG/DL
BILIRUBIN DIRECT+TOT PNL SERPL-MCNC: 0.7 MG/DL
BNP BLD-MCNC: 203.7 PG/ML
BUN SERPL-MCNC: 11.2 MG/DL (ref 9.8–20.1)
BUN SERPL-MCNC: 13.1 MG/DL (ref 9.8–20.1)
BUN SERPL-MCNC: 14.3 MG/DL (ref 9.8–20.1)
BUN SERPL-MCNC: 15.8 MG/DL (ref 9.8–20.1)
BUN SERPL-MCNC: 16.5 MG/DL (ref 9.8–20.1)
BUN SERPL-MCNC: 17 MG/DL (ref 9.8–20.1)
BUN SERPL-MCNC: 19.3 MG/DL (ref 9.8–20.1)
BUN SERPL-MCNC: 19.5 MG/DL (ref 9.8–20.1)
BUN SERPL-MCNC: 22.4 MG/DL (ref 9.8–20.1)
BUN SERPL-MCNC: 25.1 MG/DL (ref 9.8–20.1)
BUN SERPL-MCNC: 28.1 MG/DL (ref 9.8–20.1)
BUN SERPL-MCNC: 30.8 MG/DL (ref 9.8–20.1)
BUN SERPL-MCNC: 32.1 MG/DL (ref 9.8–20.1)
BUN SERPL-MCNC: 33.9 MG/DL (ref 9.8–20.1)
BUN SERPL-MCNC: 35.4 MG/DL (ref 9.8–20.1)
BUN SERPL-MCNC: 38.7 MG/DL (ref 9.8–20.1)
BUN SERPL-MCNC: 9.6 MG/DL (ref 9.8–20.1)
C PNEUM DNA NPH QL NAA+NON-PROBE: NOT DETECTED
CALCIUM SERPL-MCNC: 7.5 MG/DL (ref 8.4–10.2)
CALCIUM SERPL-MCNC: 8.5 MG/DL (ref 8.4–10.2)
CALCIUM SERPL-MCNC: 8.6 MG/DL (ref 8.4–10.2)
CALCIUM SERPL-MCNC: 8.6 MG/DL (ref 8.4–10.2)
CALCIUM SERPL-MCNC: 8.7 MG/DL (ref 8.4–10.2)
CALCIUM SERPL-MCNC: 8.8 MG/DL (ref 8.4–10.2)
CALCIUM SERPL-MCNC: 9.1 MG/DL (ref 8.4–10.2)
CALCIUM SERPL-MCNC: 9.2 MG/DL (ref 8.4–10.2)
CALCIUM SERPL-MCNC: 9.2 MG/DL (ref 8.4–10.2)
CALCIUM SERPL-MCNC: 9.3 MG/DL (ref 8.4–10.2)
CALCIUM SERPL-MCNC: 9.5 MG/DL (ref 8.4–10.2)
CANNABINOIDS UR QL SCN: NEGATIVE
CAOX CRY URNS QL MICRO: ABNORMAL /HPF
CAOX CRY URNS QL MICRO: ABNORMAL /HPF
CHLORIDE SERPL-SCNC: 105 MMOL/L (ref 98–107)
CHLORIDE SERPL-SCNC: 105 MMOL/L (ref 98–107)
CHLORIDE SERPL-SCNC: 107 MMOL/L (ref 98–107)
CHLORIDE SERPL-SCNC: 108 MMOL/L (ref 98–107)
CHLORIDE SERPL-SCNC: 108 MMOL/L (ref 98–107)
CHLORIDE SERPL-SCNC: 109 MMOL/L (ref 98–107)
CHLORIDE SERPL-SCNC: 110 MMOL/L (ref 98–107)
CHLORIDE SERPL-SCNC: 110 MMOL/L (ref 98–107)
CHLORIDE SERPL-SCNC: 111 MMOL/L (ref 98–107)
CHLORIDE SERPL-SCNC: 113 MMOL/L (ref 98–107)
CHLORIDE SERPL-SCNC: 115 MMOL/L (ref 98–107)
CHLORIDE SERPL-SCNC: 116 MMOL/L (ref 98–107)
CHLORIDE SERPL-SCNC: 118 MMOL/L (ref 98–107)
CHOLEST SERPL-MCNC: 166 MG/DL
CHOLEST SERPL-MCNC: 192 MG/DL
CHOLEST SERPL-MCNC: 201 MG/DL
CHOLEST SERPL-MCNC: 203 MG/DL
CHOLEST/HDLC SERPL: 4 {RATIO} (ref 0–5)
CHOLEST/HDLC SERPL: 7 {RATIO} (ref 0–5)
CO2 SERPL-SCNC: 18 MMOL/L (ref 23–31)
CO2 SERPL-SCNC: 19 MMOL/L (ref 23–31)
CO2 SERPL-SCNC: 20 MMOL/L (ref 23–31)
CO2 SERPL-SCNC: 20 MMOL/L (ref 23–31)
CO2 SERPL-SCNC: 21 MMOL/L (ref 23–31)
CO2 SERPL-SCNC: 22 MMOL/L (ref 23–31)
CO2 SERPL-SCNC: 22 MMOL/L (ref 23–31)
CO2 SERPL-SCNC: 23 MMOL/L (ref 23–31)
CO2 SERPL-SCNC: 23 MMOL/L (ref 23–31)
CO2 SERPL-SCNC: 25 MMOL/L (ref 23–31)
CO2 SERPL-SCNC: 26 MMOL/L (ref 23–31)
CO2 SERPL-SCNC: 26 MMOL/L (ref 23–31)
COCAINE UR QL SCN: NEGATIVE
COLOR UR AUTO: ABNORMAL
COLOR UR AUTO: ABNORMAL
COLOR UR AUTO: YELLOW
COLOR UR: ABNORMAL
COLOR UR: YELLOW
CREAT SERPL-MCNC: 0.59 MG/DL (ref 0.55–1.02)
CREAT SERPL-MCNC: 0.76 MG/DL (ref 0.55–1.02)
CREAT SERPL-MCNC: 0.76 MG/DL (ref 0.55–1.02)
CREAT SERPL-MCNC: 0.77 MG/DL (ref 0.55–1.02)
CREAT SERPL-MCNC: 0.8 MG/DL (ref 0.55–1.02)
CREAT SERPL-MCNC: 0.81 MG/DL (ref 0.55–1.02)
CREAT SERPL-MCNC: 0.83 MG/DL (ref 0.55–1.02)
CREAT SERPL-MCNC: 0.86 MG/DL (ref 0.55–1.02)
CREAT SERPL-MCNC: 0.86 MG/DL (ref 0.55–1.02)
CREAT SERPL-MCNC: 0.94 MG/DL (ref 0.55–1.02)
CREAT SERPL-MCNC: 0.97 MG/DL (ref 0.55–1.02)
CREAT SERPL-MCNC: 0.97 MG/DL (ref 0.55–1.02)
CREAT SERPL-MCNC: 0.98 MG/DL (ref 0.55–1.02)
CREAT SERPL-MCNC: 1 MG/DL (ref 0.55–1.02)
CREAT SERPL-MCNC: 1.1 MG/DL (ref 0.55–1.02)
CREAT SERPL-MCNC: 1.14 MG/DL (ref 0.55–1.02)
CREAT SERPL-MCNC: 1.24 MG/DL (ref 0.55–1.02)
CREAT/UREA NIT SERPL: 18
CREAT/UREA NIT SERPL: 23
CREAT/UREA NIT SERPL: 24
CREAT/UREA NIT SERPL: 25
CREAT/UREA NIT SERPL: 36
CREAT/UREA NIT SERPL: 40
CREAT/UREA NIT SERPL: 45
CREAT/UREA NIT SERPL: 50
CRP SERPL HS-MCNC: 15 MG/L
CRP SERPL HS-MCNC: 3 MG/L
CRP SERPL-MCNC: 70.8 MG/L
DEPRECATED CALCIDIOL+CALCIFEROL SERPL-MC: 69.1 NG/ML (ref 30–80)
DEPRECATED CALCIDIOL+CALCIFEROL SERPL-MC: 78.9 NG/ML (ref 30–80)
DEPRECATED CALCIDIOL+CALCIFEROL SERPL-MC: 85.1 NG/ML (ref 30–80)
DEPRECATED CALCIDIOL+CALCIFEROL SERPL-MC: 86.8 NG/ML (ref 30–80)
EOSINOPHIL # BLD AUTO: 0.01 X10(3)/MCL (ref 0–0.9)
EOSINOPHIL # BLD AUTO: 0.03 X10(3)/MCL (ref 0–0.9)
EOSINOPHIL # BLD AUTO: 0.05 X10(3)/MCL (ref 0–0.9)
EOSINOPHIL # BLD AUTO: 0.05 X10(3)/MCL (ref 0–0.9)
EOSINOPHIL # BLD AUTO: 0.07 X10(3)/MCL (ref 0–0.9)
EOSINOPHIL # BLD AUTO: 0.08 X10(3)/MCL (ref 0–0.9)
EOSINOPHIL # BLD AUTO: 0.08 X10(3)/MCL (ref 0–0.9)
EOSINOPHIL # BLD AUTO: 0.13 X10(3)/MCL (ref 0–0.9)
EOSINOPHIL # BLD AUTO: 0.13 X10(3)/MCL (ref 0–0.9)
EOSINOPHIL # BLD AUTO: 0.17 X10(3)/MCL (ref 0–0.9)
EOSINOPHIL # BLD AUTO: 0.26 X10(3)/MCL (ref 0–0.9)
EOSINOPHIL # BLD AUTO: 0.31 X10(3)/MCL (ref 0–0.9)
EOSINOPHIL # BLD AUTO: 0.31 X10(3)/MCL (ref 0–0.9)
EOSINOPHIL # BLD AUTO: 0.32 X10(3)/MCL (ref 0–0.9)
EOSINOPHIL # BLD AUTO: 0.39 X10(3)/MCL (ref 0–0.9)
EOSINOPHIL # BLD AUTO: 0.41 X10(3)/MCL (ref 0–0.9)
EOSINOPHIL # BLD AUTO: 0.46 X10(3)/MCL (ref 0–0.9)
EOSINOPHIL NFR BLD AUTO: 0.1 %
EOSINOPHIL NFR BLD AUTO: 0.2 %
EOSINOPHIL NFR BLD AUTO: 0.3 %
EOSINOPHIL NFR BLD AUTO: 0.3 %
EOSINOPHIL NFR BLD AUTO: 0.7 %
EOSINOPHIL NFR BLD AUTO: 0.9 %
EOSINOPHIL NFR BLD AUTO: 0.9 %
EOSINOPHIL NFR BLD AUTO: 1 %
EOSINOPHIL NFR BLD AUTO: 1.1 %
EOSINOPHIL NFR BLD AUTO: 2.3 %
EOSINOPHIL NFR BLD AUTO: 2.4 %
EOSINOPHIL NFR BLD AUTO: 2.6 %
EOSINOPHIL NFR BLD AUTO: 3.1 %
EOSINOPHIL NFR BLD AUTO: 3.4 %
EOSINOPHIL NFR BLD AUTO: 3.5 %
EOSINOPHIL NFR BLD AUTO: 3.8 %
EOSINOPHIL NFR BLD AUTO: 4.4 %
ERYTHROCYTE [DISTWIDTH] IN BLOOD BY AUTOMATED COUNT: 11.9 % (ref 11.5–17)
ERYTHROCYTE [DISTWIDTH] IN BLOOD BY AUTOMATED COUNT: 13.2 % (ref 11.5–17)
ERYTHROCYTE [DISTWIDTH] IN BLOOD BY AUTOMATED COUNT: 13.3 % (ref 11.5–17)
ERYTHROCYTE [DISTWIDTH] IN BLOOD BY AUTOMATED COUNT: 13.4 % (ref 11.5–17)
ERYTHROCYTE [DISTWIDTH] IN BLOOD BY AUTOMATED COUNT: 13.6 % (ref 11.5–17)
ERYTHROCYTE [DISTWIDTH] IN BLOOD BY AUTOMATED COUNT: 13.6 % (ref 11.5–17)
ERYTHROCYTE [DISTWIDTH] IN BLOOD BY AUTOMATED COUNT: 13.8 % (ref 11.5–17)
ERYTHROCYTE [DISTWIDTH] IN BLOOD BY AUTOMATED COUNT: 14.2 % (ref 11.5–17)
ERYTHROCYTE [DISTWIDTH] IN BLOOD BY AUTOMATED COUNT: 14.6 % (ref 11.5–17)
ERYTHROCYTE [DISTWIDTH] IN BLOOD BY AUTOMATED COUNT: 14.8 % (ref 11.5–17)
ERYTHROCYTE [DISTWIDTH] IN BLOOD BY AUTOMATED COUNT: 14.8 % (ref 11.5–17)
ERYTHROCYTE [DISTWIDTH] IN BLOOD BY AUTOMATED COUNT: 14.9 % (ref 11.5–17)
ERYTHROCYTE [DISTWIDTH] IN BLOOD BY AUTOMATED COUNT: 15 % (ref 11.5–17)
ERYTHROCYTE [DISTWIDTH] IN BLOOD BY AUTOMATED COUNT: 15.1 % (ref 11.5–17)
ERYTHROCYTE [DISTWIDTH] IN BLOOD BY AUTOMATED COUNT: 15.1 % (ref 11.5–17)
ERYTHROCYTE [DISTWIDTH] IN BLOOD BY AUTOMATED COUNT: 16 % (ref 11.5–17)
ERYTHROCYTE [DISTWIDTH] IN BLOOD BY AUTOMATED COUNT: 16.1 % (ref 11.5–17)
ERYTHROCYTE [SEDIMENTATION RATE] IN BLOOD: 41 MM/HR (ref 0–20)
ERYTHROCYTE [SEDIMENTATION RATE] IN BLOOD: 71 MM/HR (ref 0–20)
EST. AVERAGE GLUCOSE BLD GHB EST-MCNC: 91.1 MG/DL
EST. AVERAGE GLUCOSE BLD GHB EST-MCNC: 93.9 MG/DL
EST. AVERAGE GLUCOSE BLD GHB EST-MCNC: 96.8 MG/DL
EST. AVERAGE GLUCOSE BLD GHB EST-MCNC: 96.8 MG/DL
ETHANOL SERPL-MCNC: <10 MG/DL
FENTANYL UR QL SCN: NEGATIVE
FLUAV AG UPPER RESP QL IA.RAPID: NOT DETECTED
FLUAV AG UPPER RESP QL IA.RAPID: NOT DETECTED
FLUBV AG UPPER RESP QL IA.RAPID: NOT DETECTED
FLUBV AG UPPER RESP QL IA.RAPID: NOT DETECTED
GFR SERPLBLD CREATININE-BSD FMLA CKD-EPI: 42 MLS/MIN/1.73/M2
GFR SERPLBLD CREATININE-BSD FMLA CKD-EPI: 47 MLS/MIN/1.73/M2
GFR SERPLBLD CREATININE-BSD FMLA CKD-EPI: 49 MLS/MIN/1.73/M2
GFR SERPLBLD CREATININE-BSD FMLA CKD-EPI: 55 MLS/MIN/1.73/M2
GFR SERPLBLD CREATININE-BSD FMLA CKD-EPI: 56 MLS/MIN/1.73/M2
GFR SERPLBLD CREATININE-BSD FMLA CKD-EPI: 56 MLS/MIN/1.73/M2
GFR SERPLBLD CREATININE-BSD FMLA CKD-EPI: 57 MLS/MIN/1.73/M2
GFR SERPLBLD CREATININE-BSD FMLA CKD-EPI: 58 MLS/MIN/1.73/M2
GFR SERPLBLD CREATININE-BSD FMLA CKD-EPI: >60 MLS/MIN/1.73/M2
GLOBULIN SER-MCNC: 3.2 GM/DL (ref 2.4–3.5)
GLOBULIN SER-MCNC: 3.3 GM/DL (ref 2.4–3.5)
GLOBULIN SER-MCNC: 3.4 GM/DL (ref 2.4–3.5)
GLOBULIN SER-MCNC: 3.5 GM/DL (ref 2.4–3.5)
GLOBULIN SER-MCNC: 3.8 GM/DL (ref 2.4–3.5)
GLOBULIN SER-MCNC: 4.3 GM/DL (ref 2.4–3.5)
GLUCOSE SERPL-MCNC: 100 MG/DL (ref 82–115)
GLUCOSE SERPL-MCNC: 105 MG/DL (ref 82–115)
GLUCOSE SERPL-MCNC: 106 MG/DL (ref 82–115)
GLUCOSE SERPL-MCNC: 108 MG/DL (ref 82–115)
GLUCOSE SERPL-MCNC: 111 MG/DL (ref 82–115)
GLUCOSE SERPL-MCNC: 115 MG/DL (ref 82–115)
GLUCOSE SERPL-MCNC: 118 MG/DL (ref 82–115)
GLUCOSE SERPL-MCNC: 119 MG/DL (ref 82–115)
GLUCOSE SERPL-MCNC: 124 MG/DL (ref 82–115)
GLUCOSE SERPL-MCNC: 127 MG/DL (ref 82–115)
GLUCOSE SERPL-MCNC: 132 MG/DL (ref 82–115)
GLUCOSE SERPL-MCNC: 82 MG/DL (ref 82–115)
GLUCOSE SERPL-MCNC: 84 MG/DL (ref 82–115)
GLUCOSE SERPL-MCNC: 87 MG/DL (ref 82–115)
GLUCOSE SERPL-MCNC: 89 MG/DL (ref 82–115)
GLUCOSE SERPL-MCNC: 98 MG/DL (ref 82–115)
GLUCOSE SERPL-MCNC: 99 MG/DL (ref 82–115)
GLUCOSE UR QL STRIP.AUTO: NEGATIVE
GLUCOSE UR QL STRIP.AUTO: NEGATIVE MG/DL
GLUCOSE UR QL STRIP.AUTO: NORMAL
HADV DNA NPH QL NAA+NON-PROBE: NOT DETECTED
HBA1C MFR BLD: 4.8 %
HBA1C MFR BLD: 4.9 %
HBA1C MFR BLD: 5 %
HBA1C MFR BLD: 5 %
HCOV 229E RNA NPH QL NAA+NON-PROBE: NOT DETECTED
HCOV HKU1 RNA NPH QL NAA+NON-PROBE: NOT DETECTED
HCOV NL63 RNA NPH QL NAA+NON-PROBE: NOT DETECTED
HCOV OC43 RNA NPH QL NAA+NON-PROBE: NOT DETECTED
HCT VFR BLD AUTO: 31.4 % (ref 37–47)
HCT VFR BLD AUTO: 32.5 % (ref 37–47)
HCT VFR BLD AUTO: 33.1 % (ref 37–47)
HCT VFR BLD AUTO: 33.3 % (ref 37–47)
HCT VFR BLD AUTO: 33.5 % (ref 37–47)
HCT VFR BLD AUTO: 33.8 % (ref 37–47)
HCT VFR BLD AUTO: 34.1 % (ref 37–47)
HCT VFR BLD AUTO: 34.6 % (ref 37–47)
HCT VFR BLD AUTO: 36.1 % (ref 37–47)
HCT VFR BLD AUTO: 36.1 % (ref 37–47)
HCT VFR BLD AUTO: 36.3 % (ref 37–47)
HCT VFR BLD AUTO: 36.6 % (ref 37–47)
HCT VFR BLD AUTO: 37.1 % (ref 37–47)
HCT VFR BLD AUTO: 38.1 % (ref 37–47)
HCT VFR BLD AUTO: 38.1 % (ref 37–47)
HCT VFR BLD AUTO: 38.9 % (ref 37–47)
HCT VFR BLD AUTO: 39.4 % (ref 37–47)
HDLC SERPL-MCNC: 27 MG/DL (ref 35–60)
HDLC SERPL-MCNC: 29 MG/DL (ref 35–60)
HDLC SERPL-MCNC: 31 MG/DL (ref 35–60)
HDLC SERPL-MCNC: 38 MG/DL (ref 35–60)
HGB BLD-MCNC: 10.5 G/DL (ref 12–16)
HGB BLD-MCNC: 11 G/DL (ref 12–16)
HGB BLD-MCNC: 11.5 G/DL (ref 12–16)
HGB BLD-MCNC: 11.6 G/DL (ref 12–16)
HGB BLD-MCNC: 11.6 G/DL (ref 12–16)
HGB BLD-MCNC: 11.8 G/DL (ref 12–16)
HGB BLD-MCNC: 11.9 G/DL (ref 12–16)
HGB BLD-MCNC: 11.9 G/DL (ref 12–16)
HGB BLD-MCNC: 12.1 G/DL (ref 12–16)
HGB BLD-MCNC: 12.4 G/DL (ref 12–16)
HGB BLD-MCNC: 12.6 G/DL (ref 12–16)
HGB BLD-MCNC: 12.7 G/DL (ref 12–16)
HGB BLD-MCNC: 12.8 G/DL (ref 12–16)
HGB BLD-MCNC: 13 G/DL (ref 12–16)
HGB BLD-MCNC: 13.1 G/DL (ref 12–16)
HGB BLD-MCNC: 13.3 G/DL (ref 12–16)
HGB BLD-MCNC: 13.4 G/DL (ref 12–16)
HMPV RNA NPH QL NAA+NON-PROBE: NOT DETECTED
HPIV1 RNA NPH QL NAA+NON-PROBE: NOT DETECTED
HPIV2 RNA NPH QL NAA+NON-PROBE: NOT DETECTED
HPIV3 RNA NPH QL NAA+NON-PROBE: NOT DETECTED
HPIV4 RNA NPH QL NAA+NON-PROBE: NOT DETECTED
IMM GRANULOCYTES # BLD AUTO: 0.02 X10(3)/MCL (ref 0–0.04)
IMM GRANULOCYTES # BLD AUTO: 0.03 X10(3)/MCL (ref 0–0.04)
IMM GRANULOCYTES # BLD AUTO: 0.04 X10(3)/MCL (ref 0–0.04)
IMM GRANULOCYTES # BLD AUTO: 0.04 X10(3)/MCL (ref 0–0.04)
IMM GRANULOCYTES # BLD AUTO: 0.05 X10(3)/MCL (ref 0–0.04)
IMM GRANULOCYTES # BLD AUTO: 0.08 X10(3)/MCL (ref 0–0.04)
IMM GRANULOCYTES # BLD AUTO: 0.1 X10(3)/MCL (ref 0–0.04)
IMM GRANULOCYTES # BLD AUTO: 0.1 X10(3)/MCL (ref 0–0.04)
IMM GRANULOCYTES # BLD AUTO: 0.11 X10(3)/MCL (ref 0–0.04)
IMM GRANULOCYTES # BLD AUTO: 0.11 X10(3)/MCL (ref 0–0.04)
IMM GRANULOCYTES # BLD AUTO: 0.12 X10(3)/MCL (ref 0–0.04)
IMM GRANULOCYTES NFR BLD AUTO: 0.2 %
IMM GRANULOCYTES NFR BLD AUTO: 0.3 %
IMM GRANULOCYTES NFR BLD AUTO: 0.4 %
IMM GRANULOCYTES NFR BLD AUTO: 0.4 %
IMM GRANULOCYTES NFR BLD AUTO: 0.5 %
IMM GRANULOCYTES NFR BLD AUTO: 0.7 %
IMM GRANULOCYTES NFR BLD AUTO: 0.8 %
IMM GRANULOCYTES NFR BLD AUTO: 1 %
INR BLD: 1.36 (ref 0–1.3)
INR PPP: 1.5
KETONES UR QL STRIP.AUTO: ABNORMAL
KETONES UR QL STRIP.AUTO: ABNORMAL MG/DL
KETONES UR QL STRIP.AUTO: NEGATIVE
KETONES UR QL STRIP.AUTO: NEGATIVE MG/DL
KETONES UR QL STRIP.AUTO: NEGATIVE MG/DL
LACTATE SERPL-SCNC: 1.1 MMOL/L (ref 0.5–2.2)
LACTATE SERPL-SCNC: 2.4 MMOL/L (ref 0.5–2.2)
LACTATE SERPL-SCNC: 2.8 MMOL/L (ref 0.5–2.2)
LACTATE SERPL-SCNC: 4.7 MMOL/L (ref 0.5–2.2)
LDLC SERPL CALC-MCNC: 111 MG/DL (ref 50–140)
LDLC SERPL CALC-MCNC: 133 MG/DL (ref 50–140)
LDLC SERPL CALC-MCNC: 148 MG/DL (ref 50–140)
LDLC SERPL CALC-MCNC: 152 MG/DL (ref 50–140)
LEUKOCYTE ESTERASE UR QL STRIP.AUTO: ABNORMAL
LEUKOCYTE ESTERASE UR QL STRIP.AUTO: ABNORMAL UNIT/L
LEUKOCYTE ESTERASE UR QL STRIP.AUTO: NEGATIVE
LEUKOCYTE ESTERASE UR QL STRIP.AUTO: NEGATIVE UNIT/L
LEUKOCYTE ESTERASE UR QL STRIP.AUTO: NEGATIVE UNIT/L
LYMPHOCYTES # BLD AUTO: 1.17 X10(3)/MCL (ref 0.6–4.6)
LYMPHOCYTES # BLD AUTO: 1.3 X10(3)/MCL (ref 0.6–4.6)
LYMPHOCYTES # BLD AUTO: 1.7 X10(3)/MCL (ref 0.6–4.6)
LYMPHOCYTES # BLD AUTO: 2.38 X10(3)/MCL (ref 0.6–4.6)
LYMPHOCYTES # BLD AUTO: 2.57 X10(3)/MCL (ref 0.6–4.6)
LYMPHOCYTES # BLD AUTO: 2.62 X10(3)/MCL (ref 0.6–4.6)
LYMPHOCYTES # BLD AUTO: 2.63 X10(3)/MCL (ref 0.6–4.6)
LYMPHOCYTES # BLD AUTO: 2.63 X10(3)/MCL (ref 0.6–4.6)
LYMPHOCYTES # BLD AUTO: 2.68 X10(3)/MCL (ref 0.6–4.6)
LYMPHOCYTES # BLD AUTO: 2.79 X10(3)/MCL (ref 0.6–4.6)
LYMPHOCYTES # BLD AUTO: 2.9 X10(3)/MCL (ref 0.6–4.6)
LYMPHOCYTES # BLD AUTO: 2.93 X10(3)/MCL (ref 0.6–4.6)
LYMPHOCYTES # BLD AUTO: 2.96 X10(3)/MCL (ref 0.6–4.6)
LYMPHOCYTES # BLD AUTO: 3.13 X10(3)/MCL (ref 0.6–4.6)
LYMPHOCYTES # BLD AUTO: 3.32 X10(3)/MCL (ref 0.6–4.6)
LYMPHOCYTES # BLD AUTO: 3.33 X10(3)/MCL (ref 0.6–4.6)
LYMPHOCYTES # BLD AUTO: 3.73 X10(3)/MCL (ref 0.6–4.6)
LYMPHOCYTES NFR BLD AUTO: 13.8 %
LYMPHOCYTES NFR BLD AUTO: 15 %
LYMPHOCYTES NFR BLD AUTO: 16.7 %
LYMPHOCYTES NFR BLD AUTO: 18 %
LYMPHOCYTES NFR BLD AUTO: 18.3 %
LYMPHOCYTES NFR BLD AUTO: 18.7 %
LYMPHOCYTES NFR BLD AUTO: 19.6 %
LYMPHOCYTES NFR BLD AUTO: 22.1 %
LYMPHOCYTES NFR BLD AUTO: 22.9 %
LYMPHOCYTES NFR BLD AUTO: 24.4 %
LYMPHOCYTES NFR BLD AUTO: 26.4 %
LYMPHOCYTES NFR BLD AUTO: 26.4 %
LYMPHOCYTES NFR BLD AUTO: 27.5 %
LYMPHOCYTES NFR BLD AUTO: 29.6 %
LYMPHOCYTES NFR BLD AUTO: 31.2 %
LYMPHOCYTES NFR BLD AUTO: 32 %
LYMPHOCYTES NFR BLD AUTO: 36.7 %
M PNEUMO DNA NPH QL NAA+NON-PROBE: NOT DETECTED
MAGNESIUM SERPL-MCNC: 1.8 MG/DL (ref 1.6–2.6)
MCH RBC QN AUTO: 28.2 PG (ref 27–31)
MCH RBC QN AUTO: 28.2 PG (ref 27–31)
MCH RBC QN AUTO: 28.3 PG (ref 27–31)
MCH RBC QN AUTO: 29.3 PG (ref 27–31)
MCH RBC QN AUTO: 29.5 PG (ref 27–31)
MCH RBC QN AUTO: 29.6 PG (ref 27–31)
MCH RBC QN AUTO: 29.7 PG (ref 27–31)
MCH RBC QN AUTO: 29.8 PG (ref 27–31)
MCH RBC QN AUTO: 29.9 PG (ref 27–31)
MCH RBC QN AUTO: 30 PG (ref 27–31)
MCH RBC QN AUTO: 30.2 PG (ref 27–31)
MCH RBC QN AUTO: 30.4 PG (ref 27–31)
MCH RBC QN AUTO: 30.6 PG (ref 27–31)
MCH RBC QN AUTO: 30.7 PG (ref 27–31)
MCH RBC QN AUTO: 30.8 PG (ref 27–31)
MCHC RBC AUTO-ENTMCNC: 33.1 G/DL (ref 33–36)
MCHC RBC AUTO-ENTMCNC: 33.4 G/DL (ref 33–36)
MCHC RBC AUTO-ENTMCNC: 33.8 G/DL (ref 33–36)
MCHC RBC AUTO-ENTMCNC: 33.8 G/DL (ref 33–36)
MCHC RBC AUTO-ENTMCNC: 34.1 G/DL (ref 33–36)
MCHC RBC AUTO-ENTMCNC: 34.2 G/DL (ref 33–36)
MCHC RBC AUTO-ENTMCNC: 34.4 G/DL (ref 33–36)
MCHC RBC AUTO-ENTMCNC: 34.5 G/DL (ref 33–36)
MCHC RBC AUTO-ENTMCNC: 34.6 G/DL (ref 33–36)
MCHC RBC AUTO-ENTMCNC: 34.7 G/DL (ref 33–36)
MCHC RBC AUTO-ENTMCNC: 34.8 G/DL (ref 33–36)
MCHC RBC AUTO-ENTMCNC: 34.9 G/DL (ref 33–36)
MCHC RBC AUTO-ENTMCNC: 35.2 G/DL (ref 33–36)
MCV RBC AUTO: 83.3 FL (ref 80–94)
MCV RBC AUTO: 84.4 FL (ref 80–94)
MCV RBC AUTO: 85.5 FL (ref 80–94)
MCV RBC AUTO: 85.7 FL (ref 80–94)
MCV RBC AUTO: 85.7 FL (ref 80–94)
MCV RBC AUTO: 86 FL (ref 80–94)
MCV RBC AUTO: 86 FL (ref 80–94)
MCV RBC AUTO: 86.2 FL (ref 80–94)
MCV RBC AUTO: 86.2 FL (ref 80–94)
MCV RBC AUTO: 86.4 FL (ref 80–94)
MCV RBC AUTO: 87 FL (ref 80–94)
MCV RBC AUTO: 87.2 FL (ref 80–94)
MCV RBC AUTO: 87.9 FL (ref 80–94)
MCV RBC AUTO: 88.3 FL (ref 80–94)
MCV RBC AUTO: 88.5 FL (ref 80–94)
MCV RBC AUTO: 89.2 FL (ref 80–94)
MCV RBC AUTO: 90.6 FL (ref 80–94)
MDMA UR QL SCN: NEGATIVE
MONOCYTES # BLD AUTO: 0.83 X10(3)/MCL (ref 0.1–1.3)
MONOCYTES # BLD AUTO: 0.84 X10(3)/MCL (ref 0.1–1.3)
MONOCYTES # BLD AUTO: 0.86 X10(3)/MCL (ref 0.1–1.3)
MONOCYTES # BLD AUTO: 0.88 X10(3)/MCL (ref 0.1–1.3)
MONOCYTES # BLD AUTO: 0.9 X10(3)/MCL (ref 0.1–1.3)
MONOCYTES # BLD AUTO: 0.98 X10(3)/MCL (ref 0.1–1.3)
MONOCYTES # BLD AUTO: 1.01 X10(3)/MCL (ref 0.1–1.3)
MONOCYTES # BLD AUTO: 1.05 X10(3)/MCL (ref 0.1–1.3)
MONOCYTES # BLD AUTO: 1.07 X10(3)/MCL (ref 0.1–1.3)
MONOCYTES # BLD AUTO: 1.08 X10(3)/MCL (ref 0.1–1.3)
MONOCYTES # BLD AUTO: 1.12 X10(3)/MCL (ref 0.1–1.3)
MONOCYTES # BLD AUTO: 1.15 X10(3)/MCL (ref 0.1–1.3)
MONOCYTES # BLD AUTO: 1.23 X10(3)/MCL (ref 0.1–1.3)
MONOCYTES # BLD AUTO: 1.26 X10(3)/MCL (ref 0.1–1.3)
MONOCYTES # BLD AUTO: 1.27 X10(3)/MCL (ref 0.1–1.3)
MONOCYTES # BLD AUTO: 1.48 X10(3)/MCL (ref 0.1–1.3)
MONOCYTES # BLD AUTO: 1.56 X10(3)/MCL (ref 0.1–1.3)
MONOCYTES NFR BLD AUTO: 10.5 %
MONOCYTES NFR BLD AUTO: 12.7 %
MONOCYTES NFR BLD AUTO: 13.5 %
MONOCYTES NFR BLD AUTO: 15.5 %
MONOCYTES NFR BLD AUTO: 6.9 %
MONOCYTES NFR BLD AUTO: 7.3 %
MONOCYTES NFR BLD AUTO: 7.8 %
MONOCYTES NFR BLD AUTO: 7.8 %
MONOCYTES NFR BLD AUTO: 8.1 %
MONOCYTES NFR BLD AUTO: 9 %
MONOCYTES NFR BLD AUTO: 9.5 %
MONOCYTES NFR BLD AUTO: 9.5 %
MONOCYTES NFR BLD AUTO: 9.7 %
MONOCYTES NFR BLD AUTO: 9.7 %
MONOCYTES NFR BLD AUTO: 9.8 %
MONOCYTES NFR BLD AUTO: 9.8 %
MONOCYTES NFR BLD AUTO: 9.9 %
MUCOUS THREADS URNS QL MICRO: ABNORMAL /LPF
MUCOUS THREADS URNS QL MICRO: ABNORMAL /LPF
NEUTROPHILS # BLD AUTO: 10.07 X10(3)/MCL (ref 2.1–9.2)
NEUTROPHILS # BLD AUTO: 11.4 X10(3)/MCL (ref 2.1–9.2)
NEUTROPHILS # BLD AUTO: 11.76 X10(3)/MCL (ref 2.1–9.2)
NEUTROPHILS # BLD AUTO: 4.59 X10(3)/MCL (ref 2.1–9.2)
NEUTROPHILS # BLD AUTO: 4.89 X10(3)/MCL (ref 2.1–9.2)
NEUTROPHILS # BLD AUTO: 5.11 X10(3)/MCL (ref 2.1–9.2)
NEUTROPHILS # BLD AUTO: 5.68 X10(3)/MCL (ref 2.1–9.2)
NEUTROPHILS # BLD AUTO: 6.03 X10(3)/MCL (ref 2.1–9.2)
NEUTROPHILS # BLD AUTO: 6.03 X10(3)/MCL (ref 2.1–9.2)
NEUTROPHILS # BLD AUTO: 6.11 X10(3)/MCL (ref 2.1–9.2)
NEUTROPHILS # BLD AUTO: 6.25 X10(3)/MCL (ref 2.1–9.2)
NEUTROPHILS # BLD AUTO: 6.35 X10(3)/MCL (ref 2.1–9.2)
NEUTROPHILS # BLD AUTO: 7.06 X10(3)/MCL (ref 2.1–9.2)
NEUTROPHILS # BLD AUTO: 7.55 X10(3)/MCL (ref 2.1–9.2)
NEUTROPHILS # BLD AUTO: 8.74 X10(3)/MCL (ref 2.1–9.2)
NEUTROPHILS # BLD AUTO: 9 X10(3)/MCL (ref 2.1–9.2)
NEUTROPHILS # BLD AUTO: 9.38 X10(3)/MCL (ref 2.1–9.2)
NEUTROPHILS NFR BLD AUTO: 48.3 %
NEUTROPHILS NFR BLD AUTO: 54.6 %
NEUTROPHILS NFR BLD AUTO: 56.5 %
NEUTROPHILS NFR BLD AUTO: 59.1 %
NEUTROPHILS NFR BLD AUTO: 59.5 %
NEUTROPHILS NFR BLD AUTO: 59.5 %
NEUTROPHILS NFR BLD AUTO: 60.5 %
NEUTROPHILS NFR BLD AUTO: 63.4 %
NEUTROPHILS NFR BLD AUTO: 66.1 %
NEUTROPHILS NFR BLD AUTO: 66.5 %
NEUTROPHILS NFR BLD AUTO: 67.3 %
NEUTROPHILS NFR BLD AUTO: 68.1 %
NEUTROPHILS NFR BLD AUTO: 69.8 %
NEUTROPHILS NFR BLD AUTO: 71.5 %
NEUTROPHILS NFR BLD AUTO: 71.9 %
NEUTROPHILS NFR BLD AUTO: 72.8 %
NEUTROPHILS NFR BLD AUTO: 73.8 %
NITRITE UR QL STRIP.AUTO: NEGATIVE
NRBC BLD AUTO-RTO: 0 %
NRBC BLD AUTO-RTO: 0.4 %
OPIATES UR QL SCN: NEGATIVE
PCP UR QL: NEGATIVE
PH UR STRIP.AUTO: 5.5 [PH]
PH UR STRIP.AUTO: 6 [PH]
PH UR STRIP.AUTO: 6 [PH]
PH UR STRIP.AUTO: 6.5 [PH]
PH UR STRIP.AUTO: 6.5 [PH]
PH UR: 5.5 [PH] (ref 3–11)
PLATELET # BLD AUTO: 159 X10(3)/MCL (ref 130–400)
PLATELET # BLD AUTO: 169 X10(3)/MCL (ref 130–400)
PLATELET # BLD AUTO: 178 X10(3)/MCL (ref 130–400)
PLATELET # BLD AUTO: 205 X10(3)/MCL (ref 130–400)
PLATELET # BLD AUTO: 225 X10(3)/MCL (ref 130–400)
PLATELET # BLD AUTO: 261 X10(3)/MCL (ref 130–400)
PLATELET # BLD AUTO: 267 X10(3)/MCL (ref 130–400)
PLATELET # BLD AUTO: 272 X10(3)/MCL (ref 130–400)
PLATELET # BLD AUTO: 307 X10(3)/MCL (ref 130–400)
PLATELET # BLD AUTO: 316 X10(3)/MCL (ref 130–400)
PLATELET # BLD AUTO: 325 X10(3)/MCL (ref 130–400)
PLATELET # BLD AUTO: 346 X10(3)/MCL (ref 130–400)
PLATELET # BLD AUTO: 395 X10(3)/MCL (ref 130–400)
PLATELET # BLD AUTO: 448 X10(3)/MCL (ref 130–400)
PLATELET # BLD AUTO: 462 X10(3)/MCL (ref 130–400)
PLATELET # BLD AUTO: 464 X10(3)/MCL (ref 130–400)
PLATELET # BLD AUTO: 471 X10(3)/MCL (ref 130–400)
PMV BLD AUTO: 10 FL (ref 7.4–10.4)
PMV BLD AUTO: 10.1 FL (ref 7.4–10.4)
PMV BLD AUTO: 10.3 FL (ref 7.4–10.4)
PMV BLD AUTO: 10.4 FL (ref 7.4–10.4)
PMV BLD AUTO: 10.5 FL (ref 7.4–10.4)
PMV BLD AUTO: 11.2 FL (ref 7.4–10.4)
PMV BLD AUTO: 8.9 FL (ref 7.4–10.4)
PMV BLD AUTO: 9 FL (ref 7.4–10.4)
PMV BLD AUTO: 9.1 FL (ref 7.4–10.4)
PMV BLD AUTO: 9.2 FL (ref 7.4–10.4)
PMV BLD AUTO: 9.4 FL (ref 7.4–10.4)
PMV BLD AUTO: 9.5 FL (ref 7.4–10.4)
PMV BLD AUTO: 9.5 FL (ref 7.4–10.4)
PMV BLD AUTO: 9.6 FL (ref 7.4–10.4)
PMV BLD AUTO: 9.7 FL (ref 7.4–10.4)
POTASSIUM SERPL-SCNC: 3.5 MMOL/L (ref 3.5–5.1)
POTASSIUM SERPL-SCNC: 3.9 MMOL/L (ref 3.5–5.1)
POTASSIUM SERPL-SCNC: 4.2 MMOL/L (ref 3.5–5.1)
POTASSIUM SERPL-SCNC: 4.3 MMOL/L (ref 3.5–5.1)
POTASSIUM SERPL-SCNC: 4.4 MMOL/L (ref 3.5–5.1)
POTASSIUM SERPL-SCNC: 4.5 MMOL/L (ref 3.5–5.1)
POTASSIUM SERPL-SCNC: 4.5 MMOL/L (ref 3.5–5.1)
POTASSIUM SERPL-SCNC: 4.6 MMOL/L (ref 3.5–5.1)
POTASSIUM SERPL-SCNC: 4.6 MMOL/L (ref 3.5–5.1)
POTASSIUM SERPL-SCNC: 4.7 MMOL/L (ref 3.5–5.1)
POTASSIUM SERPL-SCNC: 4.8 MMOL/L (ref 3.5–5.1)
POTASSIUM SERPL-SCNC: 4.9 MMOL/L (ref 3.5–5.1)
POTASSIUM SERPL-SCNC: 5 MMOL/L (ref 3.5–5.1)
PREALB SERPL-MCNC: 20.3 MG/DL (ref 14–37)
PREALB SERPL-MCNC: 38.2 MG/DL (ref 14–37)
PROT SERPL-MCNC: 5.8 GM/DL (ref 5.8–7.6)
PROT SERPL-MCNC: 6.1 GM/DL (ref 5.8–7.6)
PROT SERPL-MCNC: 6.2 GM/DL (ref 5.8–7.6)
PROT SERPL-MCNC: 6.2 GM/DL (ref 5.8–7.6)
PROT SERPL-MCNC: 6.3 GM/DL (ref 5.8–7.6)
PROT SERPL-MCNC: 6.3 GM/DL (ref 5.8–7.6)
PROT SERPL-MCNC: 6.9 GM/DL (ref 5.8–7.6)
PROT SERPL-MCNC: 7.2 GM/DL (ref 5.8–7.6)
PROT SERPL-MCNC: 7.4 GM/DL (ref 5.8–7.6)
PROT UR QL STRIP.AUTO: 100
PROT UR QL STRIP.AUTO: 30
PROT UR QL STRIP.AUTO: ABNORMAL
PROT UR QL STRIP.AUTO: ABNORMAL
PROT UR QL STRIP.AUTO: ABNORMAL MG/DL
PROTHROMBIN TIME: 16.6 SECONDS (ref 12.5–14.5)
PROTHROMBIN TIME: 18.1 SECONDS (ref 12.5–14.5)
RBC # BLD AUTO: 3.72 X10(6)/MCL (ref 4.2–5.4)
RBC # BLD AUTO: 3.83 X10(6)/MCL (ref 4.2–5.4)
RBC # BLD AUTO: 3.84 X10(6)/MCL (ref 4.2–5.4)
RBC # BLD AUTO: 3.87 X10(6)/MCL (ref 4.2–5.4)
RBC # BLD AUTO: 3.88 X10(6)/MCL (ref 4.2–5.4)
RBC # BLD AUTO: 3.88 X10(6)/MCL (ref 4.2–5.4)
RBC # BLD AUTO: 3.9 X10(6)/MCL (ref 4.2–5.4)
RBC # BLD AUTO: 3.91 X10(6)/MCL (ref 4.2–5.4)
RBC # BLD AUTO: 4.1 X10(6)/MCL (ref 4.2–5.4)
RBC # BLD AUTO: 4.14 X10(6)/MCL (ref 4.2–5.4)
RBC # BLD AUTO: 4.15 X10(6)/MCL (ref 4.2–5.4)
RBC # BLD AUTO: 4.27 X10(6)/MCL (ref 4.2–5.4)
RBC # BLD AUTO: 4.34 X10(6)/MCL (ref 4.2–5.4)
RBC # BLD AUTO: 4.35 X10(6)/MCL (ref 4.2–5.4)
RBC # BLD AUTO: 4.42 X10(6)/MCL (ref 4.2–5.4)
RBC # BLD AUTO: 4.43 X10(6)/MCL (ref 4.2–5.4)
RBC # BLD AUTO: 4.5 X10(6)/MCL (ref 4.2–5.4)
RBC #/AREA URNS AUTO: 13 /HPF
RBC #/AREA URNS AUTO: <5 /HPF
RBC #/AREA URNS AUTO: >100 /HPF
RBC #/AREA URNS AUTO: ABNORMAL /HPF
RBC #/AREA URNS AUTO: NORMAL /HPF
RBC UR QL AUTO: ABNORMAL
RBC UR QL AUTO: ABNORMAL
RBC UR QL AUTO: ABNORMAL UNIT/L
RBC UR QL AUTO: ABNORMAL UNIT/L
RBC UR QL AUTO: NEGATIVE
RBC UR QL AUTO: NEGATIVE
RBC UR QL AUTO: NEGATIVE UNIT/L
RSV RNA NPH QL NAA+NON-PROBE: NOT DETECTED
RV+EV RNA NPH QL NAA+NON-PROBE: NOT DETECTED
SARS-COV-2 RNA RESP QL NAA+PROBE: NOT DETECTED
SARS-COV-2 RNA RESP QL NAA+PROBE: NOT DETECTED
SODIUM SERPL-SCNC: 136 MMOL/L (ref 136–145)
SODIUM SERPL-SCNC: 139 MMOL/L (ref 136–145)
SODIUM SERPL-SCNC: 140 MMOL/L (ref 136–145)
SODIUM SERPL-SCNC: 140 MMOL/L (ref 136–145)
SODIUM SERPL-SCNC: 142 MMOL/L (ref 136–145)
SODIUM SERPL-SCNC: 143 MMOL/L (ref 136–145)
SODIUM SERPL-SCNC: 144 MMOL/L (ref 136–145)
SODIUM SERPL-SCNC: 145 MMOL/L (ref 136–145)
SODIUM SERPL-SCNC: 145 MMOL/L (ref 136–145)
SODIUM SERPL-SCNC: 146 MMOL/L (ref 136–145)
SODIUM SERPL-SCNC: 147 MMOL/L (ref 136–145)
SODIUM SERPL-SCNC: 151 MMOL/L (ref 136–145)
SP GR UR STRIP.AUTO: 1.01 (ref 1–1.03)
SP GR UR STRIP.AUTO: 1.02 (ref 1–1.03)
SP GR UR STRIP.AUTO: >=1.03 (ref 1–1.03)
SP GR UR STRIP.AUTO: >=1.03 (ref 1–1.03)
SP GR UR STRIP.AUTO: >=1.04 (ref 1–1.03)
SQUAMOUS #/AREA URNS AUTO: 19 /HPF
SQUAMOUS #/AREA URNS AUTO: <5 /HPF
SQUAMOUS #/AREA URNS AUTO: ABNORMAL /HPF
SQUAMOUS #/AREA URNS AUTO: NORMAL /HPF
SQUAMOUS #/AREA URNS LPF: ABNORMAL /HPF
T4 FREE SERPL-MCNC: 1.07 NG/DL (ref 0.7–1.48)
T4 FREE SERPL-MCNC: 1.07 NG/DL (ref 0.7–1.48)
T4 FREE SERPL-MCNC: 1.51 NG/DL (ref 0.7–1.48)
T4 FREE SERPL-MCNC: >5 NG/DL (ref 0.7–1.48)
TRIGL SERPL-MCNC: 102 MG/DL (ref 37–140)
TRIGL SERPL-MCNC: 131 MG/DL (ref 37–140)
TRIGL SERPL-MCNC: 148 MG/DL (ref 37–140)
TRIGL SERPL-MCNC: 84 MG/DL (ref 37–140)
TROPONIN I SERPL-MCNC: 0.02 NG/ML (ref 0–0.04)
TROPONIN I SERPL-MCNC: <0.01 NG/ML (ref 0–0.04)
TSH SERPL-ACNC: 1.07 UIU/ML (ref 0.35–4.94)
TSH SERPL-ACNC: 1.57 UIU/ML (ref 0.35–4.94)
TSH SERPL-ACNC: 1.68 UIU/ML (ref 0.35–4.94)
TSH SERPL-ACNC: 2.7 UIU/ML (ref 0.35–4.94)
UROBILINOGEN UR STRIP-ACNC: 0.2
UROBILINOGEN UR STRIP-ACNC: 0.2
UROBILINOGEN UR STRIP-ACNC: 0.2 MG/DL
UROBILINOGEN UR STRIP-ACNC: 0.2 MG/DL
UROBILINOGEN UR STRIP-ACNC: 1 MG/DL
UROBILINOGEN UR STRIP-ACNC: 2
UROBILINOGEN UR STRIP-ACNC: 2
VLDLC SERPL CALC-MCNC: 17 MG/DL
VLDLC SERPL CALC-MCNC: 20 MG/DL
VLDLC SERPL CALC-MCNC: 26 MG/DL
VLDLC SERPL CALC-MCNC: 30 MG/DL
WBC # SPEC AUTO: 10.15 X10(3)/MCL (ref 4.5–11.5)
WBC # SPEC AUTO: 10.39 X10(3)/MCL (ref 4.5–11.5)
WBC # SPEC AUTO: 10.56 X10(3)/MCL (ref 4.5–11.5)
WBC # SPEC AUTO: 10.66 X10(3)/MCL (ref 4.5–11.5)
WBC # SPEC AUTO: 11.43 X10(3)/MCL (ref 4.5–11.5)
WBC # SPEC AUTO: 11.86 X10(3)/MCL (ref 4.5–11.5)
WBC # SPEC AUTO: 12.82 X10(3)/MCL (ref 4.5–11.5)
WBC # SPEC AUTO: 13.39 X10(3)/MCL (ref 4.5–11.5)
WBC # SPEC AUTO: 13.43 X10(3)/MCL (ref 4.5–11.5)
WBC # SPEC AUTO: 14.07 X10(3)/MCL (ref 4.5–11.5)
WBC # SPEC AUTO: 15.66 X10(3)/MCL (ref 4.5–11.5)
WBC # SPEC AUTO: 15.91 X10(3)/MCL (ref 4.5–11.5)
WBC # SPEC AUTO: 7.2 X10(3)/MCL (ref 4.5–11.5)
WBC # SPEC AUTO: 8.5 X10(3)/MCL (ref 4.5–11.5)
WBC # SPEC AUTO: 9.06 X10(3)/MCL (ref 4.5–11.5)
WBC # SPEC AUTO: 9.1 X10(3)/MCL (ref 4.5–11.5)
WBC # SPEC AUTO: 9.97 X10(3)/MCL (ref 4.5–11.5)
WBC #/AREA URNS AUTO: 13 /HPF
WBC #/AREA URNS AUTO: <5 /HPF
WBC #/AREA URNS AUTO: >100 /HPF
WBC #/AREA URNS AUTO: ABNORMAL /HPF
WBC #/AREA URNS AUTO: NORMAL /HPF
YEAST URNS QL MICRO: ABNORMAL /HPF

## 2023-01-01 PROCEDURE — 93005 ELECTROCARDIOGRAM TRACING: CPT

## 2023-01-01 PROCEDURE — 85025 COMPLETE CBC W/AUTO DIFF WBC: CPT | Performed by: NURSE PRACTITIONER

## 2023-01-01 PROCEDURE — 99284 EMERGENCY DEPT VISIT MOD MDM: CPT

## 2023-01-01 PROCEDURE — 83605 ASSAY OF LACTIC ACID: CPT | Performed by: EMERGENCY MEDICINE

## 2023-01-01 PROCEDURE — 99238 PR HOSPITAL DISCHARGE DAY,<30 MIN: ICD-10-PCS | Mod: ,,, | Performed by: INTERNAL MEDICINE

## 2023-01-01 PROCEDURE — 83036 HEMOGLOBIN GLYCOSYLATED A1C: CPT | Performed by: NURSE PRACTITIONER

## 2023-01-01 PROCEDURE — 80053 COMPREHEN METABOLIC PANEL: CPT | Performed by: NURSE PRACTITIONER

## 2023-01-01 PROCEDURE — 99214 PR OFFICE/OUTPT VISIT, EST, LEVL IV, 30-39 MIN: ICD-10-PCS | Mod: 95,,, | Performed by: INTERNAL MEDICINE

## 2023-01-01 PROCEDURE — 81001 URINALYSIS AUTO W/SCOPE: CPT | Performed by: NURSE PRACTITIONER

## 2023-01-01 PROCEDURE — 82306 VITAMIN D 25 HYDROXY: CPT | Performed by: NURSE PRACTITIONER

## 2023-01-01 PROCEDURE — 99238 HOSP IP/OBS DSCHRG MGMT 30/<: CPT | Mod: ,,, | Performed by: INTERNAL MEDICINE

## 2023-01-01 PROCEDURE — 86141 C-REACTIVE PROTEIN HS: CPT | Performed by: NURSE PRACTITIONER

## 2023-01-01 PROCEDURE — 81001 URINALYSIS AUTO W/SCOPE: CPT | Performed by: INTERNAL MEDICINE

## 2023-01-01 PROCEDURE — 25000003 PHARM REV CODE 250: Performed by: NURSE PRACTITIONER

## 2023-01-01 PROCEDURE — 83880 ASSAY OF NATRIURETIC PEPTIDE: CPT | Performed by: EMERGENCY MEDICINE

## 2023-01-01 PROCEDURE — G0378 HOSPITAL OBSERVATION PER HR: HCPCS

## 2023-01-01 PROCEDURE — 80048 BASIC METABOLIC PNL TOTAL CA: CPT | Performed by: NURSE PRACTITIONER

## 2023-01-01 PROCEDURE — G0179 MD RECERTIFICATION HHA PT: HCPCS | Mod: ,,, | Performed by: INTERNAL MEDICINE

## 2023-01-01 PROCEDURE — 87040 BLOOD CULTURE FOR BACTERIA: CPT | Performed by: EMERGENCY MEDICINE

## 2023-01-01 PROCEDURE — 84439 ASSAY OF FREE THYROXINE: CPT | Performed by: NURSE PRACTITIONER

## 2023-01-01 PROCEDURE — 85610 PROTHROMBIN TIME: CPT

## 2023-01-01 PROCEDURE — 93010 ELECTROCARDIOGRAM REPORT: CPT | Mod: ,,, | Performed by: INTERNAL MEDICINE

## 2023-01-01 PROCEDURE — 85610 PROTHROMBIN TIME: CPT | Performed by: EMERGENCY MEDICINE

## 2023-01-01 PROCEDURE — 86140 C-REACTIVE PROTEIN: CPT | Performed by: EMERGENCY MEDICINE

## 2023-01-01 PROCEDURE — 99285 EMERGENCY DEPT VISIT HI MDM: CPT | Mod: 25

## 2023-01-01 PROCEDURE — 80061 LIPID PANEL: CPT | Performed by: NURSE PRACTITIONER

## 2023-01-01 PROCEDURE — 81001 URINALYSIS AUTO W/SCOPE: CPT | Performed by: PHYSICIAN ASSISTANT

## 2023-01-01 PROCEDURE — 25000003 PHARM REV CODE 250: Performed by: INTERNAL MEDICINE

## 2023-01-01 PROCEDURE — G0179 PR HOME HEALTH MD RECERTIFICATION: ICD-10-PCS | Mod: ,,, | Performed by: INTERNAL MEDICINE

## 2023-01-01 PROCEDURE — 11000001 HC ACUTE MED/SURG PRIVATE ROOM

## 2023-01-01 PROCEDURE — 99223 1ST HOSP IP/OBS HIGH 75: CPT | Mod: AI,,, | Performed by: INTERNAL MEDICINE

## 2023-01-01 PROCEDURE — 82043 UR ALBUMIN QUANTITATIVE: CPT | Performed by: INTERNAL MEDICINE

## 2023-01-01 PROCEDURE — 93010 EKG 12-LEAD: ICD-10-PCS | Mod: ,,, | Performed by: INTERNAL MEDICINE

## 2023-01-01 PROCEDURE — 87088 URINE BACTERIA CULTURE: CPT | Performed by: INTERNAL MEDICINE

## 2023-01-01 PROCEDURE — 85730 THROMBOPLASTIN TIME PARTIAL: CPT | Performed by: EMERGENCY MEDICINE

## 2023-01-01 PROCEDURE — 87070 CULTURE OTHR SPECIMN AEROBIC: CPT | Performed by: STUDENT IN AN ORGANIZED HEALTH CARE EDUCATION/TRAINING PROGRAM

## 2023-01-01 PROCEDURE — 80053 COMPREHEN METABOLIC PANEL: CPT | Performed by: PHYSICIAN ASSISTANT

## 2023-01-01 PROCEDURE — 84134 ASSAY OF PREALBUMIN: CPT | Performed by: NURSE PRACTITIONER

## 2023-01-01 PROCEDURE — 81001 URINALYSIS AUTO W/SCOPE: CPT | Performed by: EMERGENCY MEDICINE

## 2023-01-01 PROCEDURE — 99214 OFFICE O/P EST MOD 30 MIN: CPT | Mod: 95,,, | Performed by: INTERNAL MEDICINE

## 2023-01-01 PROCEDURE — 25000242 PHARM REV CODE 250 ALT 637 W/ HCPCS

## 2023-01-01 PROCEDURE — 85025 COMPLETE CBC W/AUTO DIFF WBC: CPT | Performed by: EMERGENCY MEDICINE

## 2023-01-01 PROCEDURE — 80048 BASIC METABOLIC PNL TOTAL CA: CPT | Performed by: INTERNAL MEDICINE

## 2023-01-01 PROCEDURE — 84443 ASSAY THYROID STIM HORMONE: CPT | Performed by: NURSE PRACTITIONER

## 2023-01-01 PROCEDURE — 99215 PR OFFICE/OUTPT VISIT, EST, LEVL V, 40-54 MIN: ICD-10-PCS | Mod: ,,, | Performed by: INTERNAL MEDICINE

## 2023-01-01 PROCEDURE — 82077 ASSAY SPEC XCP UR&BREATH IA: CPT | Performed by: EMERGENCY MEDICINE

## 2023-01-01 PROCEDURE — 83735 ASSAY OF MAGNESIUM: CPT | Performed by: EMERGENCY MEDICINE

## 2023-01-01 PROCEDURE — 99223 PR INITIAL HOSPITAL CARE,LEVL III: ICD-10-PCS | Mod: AI,,, | Performed by: INTERNAL MEDICINE

## 2023-01-01 PROCEDURE — 25000003 PHARM REV CODE 250

## 2023-01-01 PROCEDURE — 85025 COMPLETE CBC W/AUTO DIFF WBC: CPT | Performed by: PHYSICIAN ASSISTANT

## 2023-01-01 PROCEDURE — 80307 DRUG TEST PRSMV CHEM ANLYZR: CPT | Performed by: EMERGENCY MEDICINE

## 2023-01-01 PROCEDURE — 84484 ASSAY OF TROPONIN QUANT: CPT | Performed by: EMERGENCY MEDICINE

## 2023-01-01 PROCEDURE — 99215 OFFICE O/P EST HI 40 MIN: CPT | Mod: ,,, | Performed by: INTERNAL MEDICINE

## 2023-01-01 PROCEDURE — 99223 1ST HOSP IP/OBS HIGH 75: CPT | Mod: ,,, | Performed by: INTERNAL MEDICINE

## 2023-01-01 PROCEDURE — 99214 OFFICE O/P EST MOD 30 MIN: CPT | Mod: S$PBB,,, | Performed by: NURSE PRACTITIONER

## 2023-01-01 PROCEDURE — 85025 COMPLETE CBC W/AUTO DIFF WBC: CPT | Performed by: INTERNAL MEDICINE

## 2023-01-01 PROCEDURE — 80053 COMPREHEN METABOLIC PANEL: CPT | Performed by: EMERGENCY MEDICINE

## 2023-01-01 PROCEDURE — 99900031 HC PATIENT EDUCATION (STAT)

## 2023-01-01 PROCEDURE — 84484 ASSAY OF TROPONIN QUANT: CPT | Performed by: NURSE PRACTITIONER

## 2023-01-01 PROCEDURE — 63600175 PHARM REV CODE 636 W HCPCS: Performed by: INTERNAL MEDICINE

## 2023-01-01 PROCEDURE — 94640 AIRWAY INHALATION TREATMENT: CPT

## 2023-01-01 PROCEDURE — 85652 RBC SED RATE AUTOMATED: CPT | Performed by: NURSE PRACTITIONER

## 2023-01-01 PROCEDURE — 0240U COVID/FLU A&B PCR: CPT | Performed by: EMERGENCY MEDICINE

## 2023-01-01 PROCEDURE — 99999 PR PBB SHADOW E&M-EST. PATIENT-LVL IV: ICD-10-PCS | Mod: PBBFAC,,, | Performed by: NURSE PRACTITIONER

## 2023-01-01 PROCEDURE — 87040 BLOOD CULTURE FOR BACTERIA: CPT

## 2023-01-01 PROCEDURE — 87086 URINE CULTURE/COLONY COUNT: CPT | Performed by: NURSE PRACTITIONER

## 2023-01-01 PROCEDURE — 99214 OFFICE O/P EST MOD 30 MIN: CPT | Mod: PBBFAC | Performed by: NURSE PRACTITIONER

## 2023-01-01 PROCEDURE — 99214 PR OFFICE/OUTPT VISIT, EST, LEVL IV, 30-39 MIN: ICD-10-PCS | Mod: S$PBB,,, | Performed by: NURSE PRACTITIONER

## 2023-01-01 PROCEDURE — 87186 SC STD MICRODIL/AGAR DIL: CPT | Performed by: INTERNAL MEDICINE

## 2023-01-01 PROCEDURE — 99999 PR PBB SHADOW E&M-EST. PATIENT-LVL IV: CPT | Mod: PBBFAC,,, | Performed by: NURSE PRACTITIONER

## 2023-01-01 PROCEDURE — 87088 URINE BACTERIA CULTURE: CPT | Performed by: NURSE PRACTITIONER

## 2023-01-01 PROCEDURE — 87798 DETECT AGENT NOS DNA AMP: CPT

## 2023-01-01 PROCEDURE — 99223 PR INITIAL HOSPITAL CARE,LEVL III: ICD-10-PCS | Mod: ,,, | Performed by: INTERNAL MEDICINE

## 2023-01-01 PROCEDURE — 21400001 HC TELEMETRY ROOM

## 2023-01-01 PROCEDURE — 83605 ASSAY OF LACTIC ACID: CPT

## 2023-01-01 PROCEDURE — 25500020 PHARM REV CODE 255: Performed by: NURSE PRACTITIONER

## 2023-01-01 PROCEDURE — 0240U COVID/FLU A&B PCR: CPT

## 2023-01-01 PROCEDURE — 87088 URINE BACTERIA CULTURE: CPT | Performed by: EMERGENCY MEDICINE

## 2023-01-01 PROCEDURE — 25000003 PHARM REV CODE 250: Performed by: STUDENT IN AN ORGANIZED HEALTH CARE EDUCATION/TRAINING PROGRAM

## 2023-01-01 RX ORDER — DULOXETIN HYDROCHLORIDE 20 MG/1
20 CAPSULE, DELAYED RELEASE ORAL 2 TIMES DAILY
Qty: 60 CAPSULE | Refills: 0 | OUTPATIENT
Start: 2023-01-01 | End: 2023-01-01

## 2023-01-01 RX ORDER — LIDOCAINE 50 MG/G
1 PATCH TOPICAL NIGHTLY
Qty: 30 PATCH | Refills: 0 | Status: SHIPPED | OUTPATIENT
Start: 2023-01-01 | End: 2023-01-01 | Stop reason: SDUPTHER

## 2023-01-01 RX ORDER — TALC
6 POWDER (GRAM) TOPICAL NIGHTLY PRN
Qty: 30 TABLET | Refills: 0 | Status: SHIPPED | OUTPATIENT
Start: 2023-01-01 | End: 2023-01-01

## 2023-01-01 RX ORDER — DULOXETIN HYDROCHLORIDE 20 MG/1
20 CAPSULE, DELAYED RELEASE ORAL 2 TIMES DAILY
Qty: 60 CAPSULE | Refills: 0 | Status: SHIPPED | OUTPATIENT
Start: 2023-01-01 | End: 2023-01-01 | Stop reason: SDUPTHER

## 2023-01-01 RX ORDER — APIXABAN 2.5 MG/1
2.5 TABLET, FILM COATED ORAL 2 TIMES DAILY
Qty: 180 TABLET | Refills: 3 | Status: SHIPPED | OUTPATIENT
Start: 2023-01-01

## 2023-01-01 RX ORDER — LIDOCAINE 50 MG/G
1 PATCH TOPICAL NIGHTLY
Qty: 30 PATCH | Refills: 0 | Status: SHIPPED | OUTPATIENT
Start: 2023-01-01 | End: 2023-01-01

## 2023-01-01 RX ORDER — IBUPROFEN 100 MG/5ML
2 SUSPENSION, ORAL (FINAL DOSE FORM) ORAL EVERY MORNING
COMMUNITY
Start: 2023-01-01 | End: 2023-01-01

## 2023-01-01 RX ORDER — CIPROFLOXACIN 500 MG/1
500 TABLET ORAL 2 TIMES DAILY
Status: ON HOLD | COMMUNITY
Start: 2023-01-01 | End: 2023-01-01

## 2023-01-01 RX ORDER — DULOXETIN HYDROCHLORIDE 20 MG/1
20 CAPSULE, DELAYED RELEASE ORAL DAILY
Qty: 30 CAPSULE | Refills: 11
Start: 2023-01-01 | End: 2023-01-01 | Stop reason: SDUPTHER

## 2023-01-01 RX ORDER — DICLOFENAC SODIUM 10 MG/G
GEL TOPICAL
COMMUNITY
Start: 2023-01-01 | End: 2023-01-01

## 2023-01-01 RX ORDER — HYDROCODONE BITARTRATE AND ACETAMINOPHEN 5; 325 MG/1; MG/1
1 TABLET ORAL EVERY 4 HOURS PRN
Status: DISCONTINUED | OUTPATIENT
Start: 2023-01-01 | End: 2023-01-01

## 2023-01-01 RX ORDER — ONDANSETRON 2 MG/ML
4 INJECTION INTRAMUSCULAR; INTRAVENOUS
Status: DISCONTINUED | OUTPATIENT
Start: 2023-01-01 | End: 2023-01-01

## 2023-01-01 RX ORDER — SCOLOPAMINE TRANSDERMAL SYSTEM 1 MG/1
1 PATCH, EXTENDED RELEASE TRANSDERMAL
Status: DISCONTINUED | OUTPATIENT
Start: 2023-01-01 | End: 2023-01-01 | Stop reason: HOSPADM

## 2023-01-01 RX ORDER — SODIUM CHLORIDE FOR INHALATION 0.9 %
3 VIAL, NEBULIZER (ML) INHALATION
Status: DISCONTINUED | OUTPATIENT
Start: 2023-01-01 | End: 2023-01-01

## 2023-01-01 RX ORDER — ASPIRIN 81 MG/1
81 TABLET ORAL
Status: DISCONTINUED | OUTPATIENT
Start: 2023-01-01 | End: 2023-01-01 | Stop reason: HOSPADM

## 2023-01-01 RX ORDER — ACETAMINOPHEN 500 MG
1000 TABLET ORAL
Status: COMPLETED | OUTPATIENT
Start: 2023-01-01 | End: 2023-01-01

## 2023-01-01 RX ORDER — SODIUM CHLORIDE 9 MG/ML
500 INJECTION, SOLUTION INTRAVENOUS
Status: COMPLETED | OUTPATIENT
Start: 2023-01-01 | End: 2023-01-01

## 2023-01-01 RX ORDER — ONDANSETRON 8 MG/1
8 TABLET, ORALLY DISINTEGRATING ORAL EVERY 8 HOURS PRN
Qty: 12 TABLET | Refills: 0 | Status: ON HOLD | OUTPATIENT
Start: 2023-01-01 | End: 2023-01-01 | Stop reason: HOSPADM

## 2023-01-01 RX ORDER — HYDROCODONE BITARTRATE AND ACETAMINOPHEN 5; 325 MG/1; MG/1
1 TABLET ORAL
Status: DISCONTINUED | OUTPATIENT
Start: 2023-01-01 | End: 2023-01-01 | Stop reason: HOSPADM

## 2023-01-01 RX ORDER — SODIUM CHLORIDE 9 MG/ML
1000 INJECTION, SOLUTION INTRAVENOUS CONTINUOUS
Status: ACTIVE | OUTPATIENT
Start: 2023-01-01 | End: 2023-01-01

## 2023-01-01 RX ORDER — HYDROCODONE BITARTRATE AND ACETAMINOPHEN 5; 325 MG/1; MG/1
1 TABLET ORAL EVERY 6 HOURS PRN
Qty: 21 TABLET | Refills: 0 | Status: ON HOLD | OUTPATIENT
Start: 2023-01-01 | End: 2023-01-01 | Stop reason: HOSPADM

## 2023-01-01 RX ORDER — SCOLOPAMINE TRANSDERMAL SYSTEM 1 MG/1
1 PATCH, EXTENDED RELEASE TRANSDERMAL
Qty: 4 PATCH | Refills: 0 | Status: SHIPPED | OUTPATIENT
Start: 2023-01-01

## 2023-01-01 RX ORDER — SODIUM CHLORIDE 0.9 % (FLUSH) 0.9 %
10 SYRINGE (ML) INJECTION
Status: DISCONTINUED | OUTPATIENT
Start: 2023-01-01 | End: 2023-01-01 | Stop reason: HOSPADM

## 2023-01-01 RX ORDER — ONDANSETRON 4 MG/1
8 TABLET, ORALLY DISINTEGRATING ORAL EVERY 8 HOURS PRN
Status: DISCONTINUED | OUTPATIENT
Start: 2023-01-01 | End: 2023-01-01 | Stop reason: HOSPADM

## 2023-01-01 RX ORDER — ASCORBIC ACID 125 MG
5 TABLET,CHEWABLE ORAL NIGHTLY
COMMUNITY
End: 2023-01-01

## 2023-01-01 RX ORDER — ZINC GLUCONATE 50 MG
50 TABLET ORAL
COMMUNITY
End: 2023-01-01

## 2023-01-01 RX ORDER — FUROSEMIDE 10 MG/ML
40 INJECTION INTRAMUSCULAR; INTRAVENOUS ONCE
Status: COMPLETED | OUTPATIENT
Start: 2023-01-01 | End: 2023-01-01

## 2023-01-01 RX ORDER — FUROSEMIDE 20 MG/1
20 TABLET ORAL
COMMUNITY
Start: 2023-01-01

## 2023-01-01 RX ORDER — FLUTICASONE PROPIONATE 50 MCG
1 SPRAY, SUSPENSION (ML) NASAL DAILY
COMMUNITY

## 2023-01-01 RX ORDER — HYDROCODONE BITARTRATE AND ACETAMINOPHEN 5; 325 MG/1; MG/1
1 TABLET ORAL EVERY 8 HOURS PRN
Status: DISCONTINUED | OUTPATIENT
Start: 2023-01-01 | End: 2023-01-01 | Stop reason: HOSPADM

## 2023-01-01 RX ORDER — SODIUM CHLORIDE FOR INHALATION 0.9 %
3 VIAL, NEBULIZER (ML) INHALATION
Status: COMPLETED | OUTPATIENT
Start: 2023-01-01 | End: 2023-01-01

## 2023-01-01 RX ORDER — TALC
6 POWDER (GRAM) TOPICAL NIGHTLY PRN
Status: DISCONTINUED | OUTPATIENT
Start: 2023-01-01 | End: 2023-01-01 | Stop reason: HOSPADM

## 2023-01-01 RX ORDER — SODIUM CHLORIDE 9 MG/ML
1000 INJECTION, SOLUTION INTRAVENOUS
Status: COMPLETED | OUTPATIENT
Start: 2023-01-01 | End: 2023-01-01

## 2023-01-01 RX ORDER — TRAMADOL HYDROCHLORIDE 50 MG/1
50 TABLET ORAL 2 TIMES DAILY
Status: DISCONTINUED | OUTPATIENT
Start: 2023-01-01 | End: 2023-01-01 | Stop reason: HOSPADM

## 2023-01-01 RX ORDER — METOPROLOL TARTRATE 25 MG/1
25 TABLET, FILM COATED ORAL
COMMUNITY
Start: 2023-01-01

## 2023-01-01 RX ORDER — LACTULOSE 10 G/15ML
10 SOLUTION ORAL; RECTAL 3 TIMES DAILY
COMMUNITY
Start: 2023-01-01 | End: 2023-01-01

## 2023-01-01 RX ORDER — FUROSEMIDE 20 MG/1
TABLET ORAL
Status: ON HOLD | COMMUNITY
Start: 2023-01-01 | End: 2023-01-01 | Stop reason: HOSPADM

## 2023-01-01 RX ORDER — TRIAMCINOLONE ACETONIDE 1 MG/G
CREAM TOPICAL
COMMUNITY
Start: 2023-01-01 | End: 2023-01-01

## 2023-01-01 RX ORDER — METOPROLOL TARTRATE 25 MG/1
25 TABLET, FILM COATED ORAL DAILY
Status: DISCONTINUED | OUTPATIENT
Start: 2023-01-01 | End: 2023-01-01 | Stop reason: HOSPADM

## 2023-01-01 RX ORDER — SODIUM CHLORIDE 9 MG/ML
500 INJECTION, SOLUTION INTRAVENOUS
Status: ACTIVE | OUTPATIENT
Start: 2023-01-01 | End: 2023-01-01

## 2023-01-01 RX ORDER — METOPROLOL TARTRATE 25 MG/1
25 TABLET, FILM COATED ORAL DAILY
COMMUNITY
Start: 2023-01-01 | End: 2023-01-01

## 2023-01-01 RX ORDER — MUPIROCIN 20 MG/G
OINTMENT TOPICAL
Status: ON HOLD | COMMUNITY
Start: 2023-01-01 | End: 2023-01-01

## 2023-01-01 RX ORDER — HYDROCODONE BITARTRATE AND ACETAMINOPHEN 5; 325 MG/1; MG/1
1 TABLET ORAL EVERY 6 HOURS PRN
Status: ON HOLD | COMMUNITY
Start: 2023-01-01 | End: 2023-01-01 | Stop reason: SDUPTHER

## 2023-01-01 RX ORDER — MORPHINE SULFATE 4 MG/ML
2 INJECTION, SOLUTION INTRAMUSCULAR; INTRAVENOUS
Status: DISCONTINUED | OUTPATIENT
Start: 2023-01-01 | End: 2023-01-01

## 2023-01-01 RX ORDER — SODIUM CHLORIDE 9 MG/ML
INJECTION, SOLUTION INTRAVENOUS CONTINUOUS
Status: DISCONTINUED | OUTPATIENT
Start: 2023-01-01 | End: 2023-01-01 | Stop reason: HOSPADM

## 2023-01-01 RX ADMIN — ACETAMINOPHEN 1000 MG: 500 TABLET ORAL at 07:04

## 2023-01-01 RX ADMIN — HYDROCODONE BITARTRATE AND ACETAMINOPHEN 1 TABLET: 5; 325 TABLET ORAL at 08:04

## 2023-01-01 RX ADMIN — ASPIRIN 81 MG: 81 TABLET, COATED ORAL at 09:11

## 2023-01-01 RX ADMIN — SODIUM CHLORIDE 1000 ML: 9 INJECTION, SOLUTION INTRAVENOUS at 07:11

## 2023-01-01 RX ADMIN — METOPROLOL TARTRATE 25 MG: 25 TABLET, FILM COATED ORAL at 09:11

## 2023-01-01 RX ADMIN — FUROSEMIDE 40 MG: 10 INJECTION, SOLUTION INTRAMUSCULAR; INTRAVENOUS at 09:11

## 2023-01-01 RX ADMIN — Medication 6 MG: at 12:04

## 2023-01-01 RX ADMIN — SODIUM CHLORIDE: 9 INJECTION, SOLUTION INTRAVENOUS at 12:04

## 2023-01-01 RX ADMIN — SODIUM CHLORIDE: 9 INJECTION, SOLUTION INTRAVENOUS at 10:04

## 2023-01-01 RX ADMIN — SODIUM CHLORIDE 1000 ML: 9 INJECTION, SOLUTION INTRAVENOUS at 09:11

## 2023-01-01 RX ADMIN — APIXABAN 2.5 MG: 2.5 TABLET, FILM COATED ORAL at 09:11

## 2023-01-01 RX ADMIN — ISODIUM CHLORIDE 3 ML: 0.03 SOLUTION RESPIRATORY (INHALATION) at 08:11

## 2023-01-01 RX ADMIN — SODIUM CHLORIDE 500 ML: 9 INJECTION, SOLUTION INTRAVENOUS at 04:04

## 2023-01-01 RX ADMIN — HYDROCODONE BITARTRATE AND ACETAMINOPHEN 1 TABLET: 5; 325 TABLET ORAL at 12:04

## 2023-01-01 RX ADMIN — HYDROCODONE BITARTRATE AND ACETAMINOPHEN 1 TABLET: 5; 325 TABLET ORAL at 03:04

## 2023-01-01 RX ADMIN — CEFEPIME 1 G: 1 INJECTION, POWDER, FOR SOLUTION INTRAMUSCULAR; INTRAVENOUS at 09:11

## 2023-01-01 RX ADMIN — SCOPOLAMINE 1 PATCH: 1 PATCH TRANSDERMAL at 10:11

## 2023-01-01 RX ADMIN — APIXABAN 2.5 MG: 2.5 TABLET, FILM COATED ORAL at 08:04

## 2023-01-01 RX ADMIN — IOPAMIDOL 100 ML: 755 INJECTION, SOLUTION INTRAVENOUS at 08:04

## 2023-01-01 RX ADMIN — HYDROCODONE BITARTRATE AND ACETAMINOPHEN 1 TABLET: 5; 325 TABLET ORAL at 11:04

## 2023-01-01 RX ADMIN — CEFEPIME 1 G: 1 INJECTION, POWDER, FOR SOLUTION INTRAMUSCULAR; INTRAVENOUS at 08:11

## 2023-01-23 NOTE — PROGRESS NOTES
Outpatient Care Management  Patient Not Qualified    Patient: Ally Valdez  MRN:  [id number]  Date of Service:  1/23/2023  Completed by:  Lazara Burnett RN    Chief Complaint   Patient presents with    OPCM Chart Review    OPCM Enrollment Call    Case Closure     1-23-23       Patient Summary     Program:  OPCM High Risk     Reason Not Qualified:  Needs met by others     Pt graduated from OPCM November 2022. Spoke to daughter Yvonne Valdez. Fly is looking for any community resources available for her mother. Advised will refer to [employer] for access to a  and any additional resources available. Contact info for [employer] provided to Yvonne.

## 2023-01-23 NOTE — TELEPHONE ENCOUNTER
"----- Message from Dong Vogt MD sent at 1/23/2023 10:51 AM CST -----  Regarding: RE: OPCM Potential- HHRR Recertification   order for an OPCM (outpatient case management) referral, so that they can review patient and determine if Ms. Valdez would require and/or benefit from additional community resources.  ----- Message -----  From: David Calvin MA  Sent: 1/23/2023   9:30 AM CST  To: Dong Vogt MD  Subject: FW: OPCM Potential- HHRR Recertification         Okay to order?  ----- Message -----  From: Camille Cassidy LPN  Sent: 1/23/2023   7:55 AM CST  To: [facility] Staff  Subject: OPCM Potential- HHRR Recertification             The recertification of home health services for Ally Valdez has been reviewed by our Post-Acute [facility] team.  Please note the following:  Per [employer] guidelines, criteria has not been met for home health recertification #for skilled nursing services (criteria was met, however, for PT services) due to "skill not indicated/identified."  A potential need for OPCM has been identified. Please consider referring to OPCM for further assistance.    In the summary of the home health order, the writer indicated that patient has had frequent falls. It was not clear as to whether patient has adequate family assistance. Please consider placing an order for an OPCM (outpatient case management) referral, so that they can review patient and determine if Ms. Valdez would require and/or benefit from additional community resources. Thank you.(Summary/Free Text)      Respectfully,    Camille Cassidy LPN  Clinical Coordinator  [facility]         "

## 2023-02-02 NOTE — TELEPHONE ENCOUNTER
----- Message from Dong Vogt MD sent at 2/2/2023  3:43 PM CST -----  It was correct thank you  ----- Message -----  From: David Calvin MA  Sent: 2/2/2023  10:54 AM CST  To: Dong Vogt MD    Pt daughter called stated Dr. Zhang put her on cipfloxacin 500 mg on 01/29 for an UTI. Pt daughter stated her  mother symptom's improved. Patient daughter just wanted you to review patient lab results to make sure it was the correct medication her mother was taking. Please advise?

## 2023-02-15 PROBLEM — N39.0 UTI (URINARY TRACT INFECTION): Status: ACTIVE | Noted: 2023-01-01

## 2023-02-15 NOTE — PROGRESS NOTES
This is a telemedicine note. Patient was treated using telemedicine, real time audio and video, according to [facility] protocols. I, distant provider, conducted the visit from location identified below. The patient participated in the visit at a non-[facility] location selected by the patient (or patients representative), identified below. I am licensed in the state where the patient stated they are located. The patient (or patients representative) stated that they understood and accepted the privacy and security risks to their information at their location.   Patient was located at Home.     I, distant provider, was located at [facility].     Subjective:      Patient ID: Ally Valdez is a 87 y.o. female.    Chief Complaint: Follow-up      HPI:  87 year old female on telemed for follow up  COVID 22  Needs a Hospital bed; patient has venous stasis ulcers secondary to poor circulation as well has atrial fibrillation causing heart failure as well as vosovagal syncope. .   ; Cipro sensitive e coli- completed treatment with noted clearence of urine    Past Medical History:  Past Medical History:   Diagnosis Date    Alzheimer disease     Dementia     Foot ulcer, left     Hypertension     Paroxysmal atrial fibrillation     PVD (peripheral vascular disease)      Past Surgical History:   Procedure Laterality Date     SECTION  1973    Stent placement right thigh  2021     Review of patient's allergies indicates:   Allergen Reactions    Lisinopril Swelling    Fluconazole      Drug interaction w/Eliquis     Current Outpatient Medications on File Prior to Visit   Medication Sig Dispense Refill    ascorbic acid, vitamin C, (VITAMIN C) 1000 MG tablet Take 1,000 mg by mouth once daily.      aspirin (ECOTRIN) 81 MG EC tablet Take 81 mg by mouth 3 (three) times a week.      B.coagul,subtilis/inulin/vit C (UP4 PROBIOTICS PLUS PREBIOTIC ORAL) Take by mouth once daily.      cholecalciferol,  vitamin D3, 125 mcg (5,000 unit) Tab Take 5,000 Units by mouth once daily.      cranberry fruit concentrate (CRAN- MG ORAL) Take by mouth once daily.      diclofenac sodium (VOLTAREN) 1 % Gel APPLY 1 APPLICATION TOPICALLY 4 TIMES A DAY AS DIRECTED FOR 30 DAYS      ELDERBERRY FRUIT ORAL Take by mouth once daily.      ELIQUIS 2.5 mg Tab Take 2.5 mg by mouth 2 (two) times daily.      lactulose (CHRONULAC) 10 gram/15 mL solution Take 15 mLs (10 g total) by mouth 3 (three) times daily. 1892 mL 6    nystatin (MYCOSTATIN) powder APPLY TO AFFECTED AREA TOPICALLY TWICE A DAY 60 g 2    triamcinolone acetonide 0.1% (KENALOG) 0.1 % cream Apply topically 2 (two) times daily as needed (eczema). 80 g 1    trolamine salicylate (ASPERCREME) 10 % cream Apply topically as needed.      zinc acetate 50 mg (zinc) Cap Take by mouth once daily.       No current facility-administered medications on file prior to visit.     Social History     Socioeconomic History    Marital status:    Tobacco Use    Smoking status: Never    Smokeless tobacco: Never   Substance and Sexual Activity    Alcohol use: Never    Drug use: Never    Sexual activity: Not Currently     Partners: Male     Birth control/protection: None     Family History   Problem Relation Age of Onset    Cancer Mother     Alzheimer's disease Father        Review of Systems  A comprehensive review of systems was performed and was negative with exception of what is documented above.     Objective:   /81   Pulse 76   Physical Exam  Constitutional:       Appearance: Normal appearance.   Pulmonary:      Effort: Pulmonary effort is normal.   Neurological:      Mental Status: She is alert.   Psychiatric:         Mood and Affect: Mood normal.         Behavior: Behavior normal.       Assessment/ Plan:   1. Peripheral vascular disease  Assessment & Plan:  Needs a Hospital bed; patient has history of and actually with active  venous stasis ulcer-- secondary to poor  circulation. She also has a history of atrial fibrillation causing heart failure as well as vosovagal syncope.     She currently has one venous stasis ulcer that is being managed by cardiology. They go back in March for a follow up US.     Order for hospital adjustable bed sent to pharmacy       2. Urinary tract infection without hematuria, site unspecified  Assessment & Plan:  Documented clearance  Advised to apply AD to the entire perineum                Follow up if symptoms worsen or fail to improve.      Face to face time spent with patient exceeds 12 minutes, over 50% of which was used for education and counseling regarding medical conditions, current medications including risk/benefit and side effects/adverse events, over the counter medications-uses/doses, home self-care and contact precautions, and red flags and indications for immediate medical attention. The patient is receptive, expresses understanding and is agreeable to plan. All questions answered

## 2023-02-15 NOTE — ASSESSMENT & PLAN NOTE
Needs a Hospital bed; patient has history of and actually with active  venous stasis ulcer-- secondary to poor circulation. She also has a history of atrial fibrillation causing heart failure as well as vosovagal syncope.     She currently has one venous stasis ulcer that is being managed by cardiology. They go back in March for a follow up US.     Order for hospital adjustable bed sent to pharmacy

## 2023-04-11 NOTE — TELEPHONE ENCOUNTER
I spoke with Leana and informed her that Dr. Murray is out of the office this week.  Leana mentioned that she faxed the plan of care since March.  She has not confirmed with the hub that it was received.  I provider her the number the post acute nurse team who handles those orders.  She will reach out to confirm that it was received.

## 2023-04-11 NOTE — TELEPHONE ENCOUNTER
----- Message from Steph Schuler sent at 4/11/2023  9:19 AM CDT -----  Regarding: advice  Type:  Needs Medical Advice    Who Called: adeline @ [facility]    Would the patient rather a call back or a response via [facility]? C/b  Best Call Back Number: [phone]  Additional Information:  need plan of care for pt req through portal & fax for dates 3/4/23-5/2/23 for re certification   fax# [phone]        98.7

## 2023-04-12 NOTE — FIRST PROVIDER EVALUATION
"Medical screening examination initiated.  I have conducted a focused provider triage encounter, findings are as follows:    Brief history of present illness:  86 y/o female presents from home with daughter for having weakness and not being herself. She did take norco at 11am along with aspirin. Only had ensure today. Had toe amputated yesterday which is why she is on norco    Vitals:    04/12/23 1335   BP: (!) 125/58   Pulse: 64   Resp: 18   Temp: 97.5 °F (36.4 °C)   SpO2: 98%   Weight: 68 kg (150 lb)   Height: 5' 3" (1.6 m)       Pertinent physical exam:  alert, sitting in wheelchair, not labored, daughter speaking for her    Brief workup plan:  labs, ekg    Preliminary workup initiated; this workup will be continued and followed by the physician or advanced practice provider that is assigned to the patient when roomed.  "

## 2023-04-12 NOTE — ED PROVIDER NOTES
Encounter Date: 2023       History     Chief Complaint   Patient presents with    Loss of Consciousness     Pt recently had toe amputation, taking Norco for pain, had a syncopal episode at home. P     See MDM    The history is provided by the patient. No  was used.   Review of patient's allergies indicates:   Allergen Reactions    Lisinopril Swelling    Fluconazole      Drug interaction w/Eliquis     Past Medical History:   Diagnosis Date    Alzheimer disease     Dementia     Foot ulcer, left     Hypertension     Paroxysmal atrial fibrillation     PVD (peripheral vascular disease)      Past Surgical History:   Procedure Laterality Date     SECTION  1973    Stent placement right thigh  2021    TOE AMPUTATION Right 2023     Family History   Problem Relation Age of Onset    Cancer Mother     Alzheimer's disease Father      Social History     Tobacco Use    Smoking status: Never    Smokeless tobacco: Never   Substance Use Topics    Alcohol use: Never    Drug use: Never     Review of Systems   Constitutional:  Negative for fever.   Respiratory:  Negative for cough and shortness of breath.    Cardiovascular:  Negative for chest pain.   Gastrointestinal:  Negative for abdominal pain.   Genitourinary:  Negative for difficulty urinating and dysuria.   Musculoskeletal:  Negative for gait problem.   Skin:  Negative for color change.   Neurological:  Positive for syncope and weakness. Negative for dizziness, speech difficulty and headaches.   Psychiatric/Behavioral:  Negative for hallucinations and suicidal ideas.    All other systems reviewed and are negative.    Physical Exam     Initial Vitals [23 1335]   BP Pulse Resp Temp SpO2   (!) 125/58 64 18 97.5 °F (36.4 °C) 98 %      MAP       --         Physical Exam    Nursing note and vitals reviewed.  Constitutional: She appears well-developed and well-nourished.   HENT:   Head: Normocephalic.   Eyes: EOM are normal.   Neck:    Normal range of motion.  Cardiovascular:  Normal rate, regular rhythm, normal heart sounds and intact distal pulses.           Pulmonary/Chest: Breath sounds normal. No respiratory distress.   Abdominal: Abdomen is soft. Bowel sounds are normal. There is no abdominal tenderness.   Musculoskeletal:         General: Normal range of motion.      Cervical back: Normal range of motion.      Comments: Amputation to left 2nd toe     Neurological: She is alert and oriented to person, place, and time. She has normal strength.   Skin: Skin is warm and dry.   Psychiatric: She has a normal mood and affect. Her behavior is normal. Judgment and thought content normal.       ED Course   Procedures  Labs Reviewed   CBC WITH DIFFERENTIAL - Abnormal; Notable for the following components:       Result Value    Hct 36.6 (*)     All other components within normal limits   COMPREHENSIVE METABOLIC PANEL - Abnormal; Notable for the following components:    Blood Urea Nitrogen 25.1 (*)     Creatinine 1.24 (*)     All other components within normal limits   URINALYSIS, REFLEX TO URINE CULTURE - Abnormal; Notable for the following components:    Protein, UA Trace (*)     Ketones, UA Trace (*)     All other components within normal limits   TROPONIN I - Normal   URINALYSIS, MICROSCOPIC - Normal     EKG Readings: (Independently Interpreted)   Rhythm: Atrial Fibrillation. Heart Rate: 66. Ectopy: No Ectopy. Conduction: Normal. ST Segments: Normal ST Segments. T Waves: Normal. Clinical Impression: Normal Sinus Rhythm     Imaging Results              CTA Chest Non-Coronary (PE Studies) (Final result)  Result time 04/12/23 20:51:52      Final result by Laci Butterfield MD (04/12/23 20:51:52)                   Impression:      No evidence of pulmonary thromboembolism.    Cystic lesions of the bilateral kidneys are partially visualized.  Recommend further evaluation with nonemergent outpatient ultrasound.      Electronically signed by: Laci  Greer  Date:    04/12/2023  Time:    20:51               Narrative:    EXAMINATION:  CTA CHEST NON CORONARY (PE STUDIES)    CLINICAL HISTORY:  Pulmonary embolism (PE) suspected, high prob;    TECHNIQUE:  Axial images of the chest were obtained with contrast. Sagittal and coronal reconstructed images were available for review. 3-D MIP reconstructions were performed from source imaging.    Automatic dose control was utilized to reduce patient radiation dose.    DLP= 284    COMPARISON:  Chest x-ray dated 07/12/2022    FINDINGS:  PULMONARY ARTERY: No evidence of pulmonary thromboembolism.    AORTA: Normal in course and caliber.    THYROID GLAND: The visualized thyroid is unremarkable.    AIRWAYS: Trachea is midline and tracheobronchial tree is patent.    HEART: The heart is enlarged in size. There is no pericardial effusion.    LUNGS: There are no new masses or nodules identified. No pleural effusion or pneumothorax.    LYMPH NODES: There is no significant mediastinal, axillary or hilar lymphadenopathy.    BONES: No displaced fracture. No aggressive appearing osseous lesion identified.    UPPER ABDOMEN: Cystic lesions of the bilateral kidneys are partially visualized.  Recommend further evaluation with nonemergent outpatient ultrasound.                                       CT Head Without Contrast (Final result)  Result time 04/12/23 17:38:07      Final result by Marcial Fink MD (04/12/23 17:38:07)                   Impression:      Chronic age-related changes.    No acute process      Electronically signed by: Marcial Fink  Date:    04/12/2023  Time:    17:38               Narrative:    EXAMINATION:  CT HEAD WITHOUT CONTRAST    CLINICAL HISTORY:  Syncope, recurrent;    TECHNIQUE:  Multiple axial images were obtained from the base of the brain to the vertex without contrast administration.  Sagittal and coronal reconstructions were performed..Automatic exposure control is utilized to reduce patient  radiation exposure.    COMPARISON:  01/27/2022    FINDINGS:  There is no intracranial mass or lesion seen.  No hemorrhage is seen.  No acute infarct is seen.  There is old area of cystic encephalomalacia in the occipital lobe on the right side.  There is some cerebral atrophy seen.  There is some compensatory ventricular dilatation and periventricular white matter changes consistent with the patient's age.  Calvarium is intact.  The posterior fossa is unremarkable..  The visualized portions of the paranasal sinuses show no acute abnormality.                                       Medications   0.9%  NaCl infusion (500 mLs Intravenous New Bag 4/12/23 1645)   acetaminophen tablet 1,000 mg (1,000 mg Oral Given 4/12/23 1955)   iopamidoL (ISOVUE-370) injection 100 mL (100 mLs Intravenous Given 4/12/23 2046)     Medical Decision Making:   Initial Assessment:   Historian:  Patient.  Patient is a 87-year-old female  that presents with syncope that has been present today. Associated symptoms generalized weakness. Surrounding information is recent surgery on her toe. Exacerbated by nothing. Relieved by nothing. Patient treatment prior to arrival none. Risk factors include none. Other history pertaining to this complaint nothing.   Assessment:  See physical exam.    Differential Diagnosis:   MI, acute coronary syndrome, CVA, generalized weakness  ED Management:  History was obtained.  Physical performed.  Spoke to Dr. Zhang who is on-call for Dr. Murray.  He would like a CT PE protocol.  Patient will be admitted to their services.  Lab studies unremarkable.  CT head is unremarkable.  No surgical consult for indicated.  Social determinants that affect healthcare age.  She does have a daughter assisted her with ADLs.  PE protocol was negative  Additional MDM:     EKG: I have independently interpreted EKG(s) - see notes.   Lab: I have independently reviewed the lab and note no significant abnormalities.      X-Rays: CT head  negative.                      Clinical Impression:   Final diagnoses:  [R53.1] Weakness  [R55] Syncope, unspecified syncope type (Primary)        ED Disposition Condition    Observation Stable                JUSTIN Mills  04/12/23 2058

## 2023-04-13 PROBLEM — R55 SYNCOPE: Status: ACTIVE | Noted: 2023-01-01

## 2023-04-13 NOTE — NURSING
Nurses Note -- 4 Eyes      4/13/2023   2:05 AM      Skin assessed during: Admit      [x] No Pressure Injuries Present    []Prevention Measures Documented      [] Yes- Altered Skin Integrity Present or Discovered   [] LDA Added if Not in Epic (Describe Wound)   [] New Altered Skin Integrity was Present on Admit and Documented in LDA   [] Wound Image Taken    Wound Care Consulted? No    Attending Nurse:  Hailey Brantley LPN     Second RN/Staff Member:  Lenore Gillette RN

## 2023-04-13 NOTE — CONSULTS
[facility]                       [address] [place] [address]    PATIENT NAME:       MICKY ACEVES  YOB: 1935  CSN:                [id number]   MRN:                [id number]  ADMIT DATE:         04/12/2023 13:37:00  PHYSICIAN:          Alex Quiroz DPM                            CONSULTATION    DATE OF CONSULT:  04/13/2023 00:00:00    HISTORY OF PRESENT ILLNESS:  Ms. Aceves is an 87-year-old    female, well known to me for quite some time now for bilateral lower extremity   wounds and digital contractures.  Unfortunately, she had an unhealed wound on   the left second toe, which was progressively getting worse with bone exposure,   which necessitated amputation of the toe 2 days ago.  She underwent this under   local with sedation.  She was subsequently transferred to home postoperatively   and doing well.  I received a call yesterday morning from the daughter stating   that the patient was having pain despite taking the pain medications as   directed.  The daughter apparently had contacted the pharmacist, who gave her   the prescription.  The pharmacist gave them directions to alternate Aleve and   Tylenol along with narcotics.  When I spoke with them, I told them to   discontinue those 2 other medications and to continue with the q.6 hours dosing   and that she can get one extra dose yesterday morning at 8 o'clock.    Approximately 3 o'clock, I started receiving calls from my office while I was in   the operating room and finally returned calls at around 6 and apparently the   daughters noted the patient was becoming less responsive and lethargic and they   called EMS to bring her to the hospital.  The patient was seen in the ER.  This   included CTs and cardiac work up.  All testing has come back unremarkable.  The   thought was the increased lethargy was probably and due in part to  the pain   medications and since that time, she has been taken down to q.8 hours and she   seems to be much more lucid with this, although she continues to complain about   discomfort.  Both she and her daughter were complaining to Dr. Zhang this   morning about the pain and there was tramadol added twice a day p.r.n.   breakthrough pain.  They have yet to take this.  There has been no fevers, no   chills.  She is not on antibiotics.    PAST MEDICAL HISTORY:  Again notable for dementia, hypertension, peripheral   artery disease.    SURGICAL HISTORY:  Recent toe amputation, PTCA and stenting procedures,   C-sections.    MEDICATIONS:  As per the chart.    ALLERGIES:  TO LISINOPRIL AND FLUCONAZOLE.     SOCIAL HISTORY:  No tobacco, alcohol, or IV drug abuse.    FAMILY HISTORY:  Cancer and Alzheimer's.    PHYSICAL EXAMINATION:  GENERAL:  Reveals an elderly, well-nourished female, currently sitting up in her   chair with legs elevated.  Seems to be doing quite well.  Not voicing any   complaints.  The minute I started going to take the shoe off her left foot, she   started yelling out, take me God.    HEART:  Deferred.  LUNGS:  Deferred.    EXTREMITIES:  Vascular wise, the feet are warm.  Legs are warm.  She has   Dopplered pulses.  NEUROLOGICAL:  She is somewhat hyperesthetic to touch.  MUSCULOSKELETAL:  Exam unchanged.  Contractures of the toes.  DERM:  Amputation site left 2nd toe is healing well and has lesions on the   dorsal aspect of the 3rd toe on the same foot with right 2nd digit.    ASSESSMENT:    1. Mental status changes secondary to narcotic usage.  2. Status post left 2nd toe amputation.    PLAN:  At this point, the patient definitely is back to her baseline.  Plans for   discharge tomorrow.  I am fine with that.  Dressings were changed.  I explained   to the daughter that I would not alter the current pain medication and maintain   it at q.8 hours and only take it if absolutely needs it.  She will need to    follow up with me in the office early next week.  Her appointment for tomorrow   has been canceled.        ______________________________  KASSANDRA Salazar/JAYLENE  DD:  04/13/2023  Time:  03:01PM  DT:  04/13/2023  Time:  05:56PM  Job #:  [id number]      CONSULTATION

## 2023-04-13 NOTE — TELEPHONE ENCOUNTER
----- Message from Charan Guevara sent at 4/13/2023  9:19 AM CDT -----  Sending message to on call provider    ED calling for a consult that needs to be sent to Dr. Quiroz for patient    [phone]

## 2023-04-13 NOTE — PT/OT/SLP PROGRESS
Physical Therapy      Patient Name:  Ally Valdez   MRN:  [id number]    Patient lethargic. Spoke with family. PT will f/u tomorrow.

## 2023-04-13 NOTE — PROGRESS NOTES
HISTORY & PHYSICAL  Hospital Medicine Consultation    Patient Name: Ally Valdez  YOB: 1935    Referring Physician: Dong Vogt, *    Admit Date: 4/12/2023                     LOS: 0          PRESENTING HISTORY         History of Present Illness:  87-year-old female patient of Dr. Dong Real for whom I was on call last night while she is out of town, who underwent surgery on the 04/11/2023 amputee of the little toe by Dr. Quiroz podiatrist as an outpatient.  Patient went home doing well until yesterday afternoon after she has taken her hydrocodone when her daughter found that she was sleepy and unresponsive, she had tried to call her quoted mama mama mama and she did not respond reason she activated 911 and she was brought to the emergency room.  Once in the emergency room patient was back to her baseline.  They had done extensive blood work they only found to have a slightly elevated creatinine, and due to apparently a syncopal episode patient was admitted on observation to our services.    We had done a CT PE does recent surgery even though patient is on Eliquis for AFib fortunately was negative   Today on exam patient is alert awake according to the daughter in the room she is back to her baseline complaining of foot pain and the fact that she wants to get out of bed  Plan explained to the daughter will keep it with hydration resume home medications consult Dr. Quiroz for his opinion most likely she will go home tomorrow    Review of Systems:   Pertinent for pain at the surgical site, increased somnolence, denies chest pain No fever recent fall apparently she walks around at home with the assistance of a rolling walker    PAST HISTORY:     Past Medical History:   Diagnosis Date    Alzheimer disease     Dementia     Foot ulcer, left     Hypertension     Paroxysmal atrial fibrillation     PVD (peripheral vascular disease)        Past Surgical History:   Procedure Laterality  Date     SECTION  1973    Stent placement right thigh  2021    TOE AMPUTATION Right 2023       Family History   Problem Relation Age of Onset    Cancer Mother     Alzheimer's disease Father        Social History     Socioeconomic History    Marital status:    Tobacco Use    Smoking status: Never    Smokeless tobacco: Never   Substance and Sexual Activity    Alcohol use: Never    Drug use: Never    Sexual activity: Not Currently     Partners: Male     Birth control/protection: None       MEDICATIONS & ALLERGIES:     No current facility-administered medications on file prior to encounter.     Current Outpatient Medications on File Prior to Encounter   Medication Sig Dispense Refill    ascorbic acid, vitamin C, (VITAMIN C) 1000 MG tablet Take 1,000 mg by mouth once daily.      aspirin (ECOTRIN) 81 MG EC tablet Take 81 mg by mouth 3 (three) times a week.      B.coagul,subtilis/inulin/vit C (UP4 PROBIOTICS PLUS PREBIOTIC ORAL) Take by mouth once daily.      cholecalciferol, vitamin D3, 125 mcg (5,000 unit) Tab Take 5,000 Units by mouth once daily.      cranberry fruit concentrate (CRAN- MG ORAL) Take by mouth once daily.      diclofenac sodium (VOLTAREN) 1 % Gel APPLY 1 APPLICATION TOPICALLY 4 TIMES A DAY AS DIRECTED FOR 30 DAYS      ELDERBERRY FRUIT ORAL Take by mouth once daily.      ELIQUIS 2.5 mg Tab Take 2.5 mg by mouth 2 (two) times daily.      furosemide (LASIX) 20 MG tablet TAKE 1 TABLET ORALLY ONCE A DAY AS NEEDED.      HYDROcodone-acetaminophen (NORCO) 5-325 mg per tablet Take 1 tablet by mouth every 6 (six) hours as needed.      lactulose (CHRONULAC) 10 gram/15 mL solution Take 15 mLs (10 g total) by mouth 3 (three) times daily. 1892 mL 6    nystatin (MYCOSTATIN) powder APPLY TO AFFECTED AREA TOPICALLY TWICE A DAY 60 g 2    triamcinolone acetonide 0.1% (KENALOG) 0.1 % cream APPLY TOPICALLY 2 (TWO) TIMES DAILY AS NEEDED (ECZEMA). 80 g 1    trolamine salicylate (ASPERCREME) 10 %  cream Apply topically as needed.      zinc acetate 50 mg (zinc) Cap Take by mouth once daily.          Review of patient's allergies indicates:   Allergen Reactions    Lisinopril Swelling    Fluconazole      Drug interaction w/Eliquis       OBJECTIVE:     Vital Signs:  Temp:  [97.5 °F (36.4 °C)-98.7 °F (37.1 °C)] 98.4 °F (36.9 °C)  Pulse:  [] 81  Resp:  [16-20] 16  SpO2:  [97 %-100 %] 97 %  BP: (115-153)/(58-98) 115/69  Body mass index is 26.75 kg/m².     Physical Exam:    Denies nausea vomiting diarrhea constipation    Patient in bed in the company of her daughter in no obvious respiratory distress arousable awake and answered all questions appropriately   HEENT normocephalic atraumatic neck supple   Heart irregular irregular systolic murmur 3 4/6   Lungs are essentially clear bilaterally with no wheezing rales or rhonchi   Abdomen nontender   Extremities bandage on the right foot area otherwise no edema chronic changes    Laboratory  Lab Results   Component Value Date    WBC 9.1 04/13/2023    HGB 11.0 (L) 04/13/2023    HCT 32.5 (L) 04/13/2023    MCV 83.3 04/13/2023     04/13/2023     Recent Labs   Lab 04/13/23  0349      K 4.7   CO2 25   BUN 22.4*   CREATININE 0.98   CALCIUM 9.1     Lab Results   Component Value Date    INR 1.24 08/21/2022    INR 1.3 03/09/2022    INR 1.2 08/06/2021    PROTIME 15.5 (H) 08/21/2022    PROTIME 15.4 03/09/2022    PROTIME 14.8 (H) 08/06/2021     Lab Results   Component Value Date    HGBA1C 5.1 02/08/2023     No results for input(s): POCTGLUCOSE in the last 72 hours.    Diagnostic Results:  Labs: Reviewed  ECG: Reviewed  X-Ray: Reviewed  CT: Reviewed      ASSESSMENT & PLAN:     List of  Current Problems:  Active Hospital Problems    Diagnosis  POA    *Syncope [R55]  Unknown    Peripheral vascular disease [I73.9]  Yes    Atrial fibrillation [I48.91]  Yes      Resolved Hospital Problems   No resolved problems to display.       Active Problems Addressed by Hospital  Medicine Today:      HIGH RISK CONDITIONS:      Recommendations:  Labs reviewed and discussed with the daughter, no evidence of infection or anemia  Creatinine significantly improved   The plan is to resume home medications get physical therapy involved   Consult case management they already have home health at home and will just her pain medication to every 12 hours rather than every 4 like they were doing it the most likely culprit of the non arousable state that the patient experienced at home on a patient of the age  Tramadol q.12 to breakthrough pain    Signing Physician:  Husam Mccray MD

## 2023-04-13 NOTE — CLINICAL REVIEW
ELVIRA Coronel completed with daughter, Yvonne, over the phone. Email sent to DIANA Weinberg for delivery.

## 2023-04-14 NOTE — PROGRESS NOTES
DISCHARGE SUMMARY  Hospital Medicine    Team: Networked reference to record PCT     Patient Name: Ally Valdez  YOB: 1935    Admit Date: 4/12/2023    Discharge Date: 04/14/2023    Discharge Attending Physician: Dong Vogt, *     Diagnoses:  Active Hospital Problems    Diagnosis  POA    *Syncope [R55]  Yes    Peripheral vascular disease [I73.9]  Yes    Atrial fibrillation [I48.91]  Yes      Resolved Hospital Problems   No resolved problems to display.       Discharged Condition: admit problems have stabillized    HOSPITAL COURSE:    Initial Presentation:  Patient presented with an unresponsive altered mental status condition recent family activated 911, apparently she has been taking her pain medications since recent surgery by Dr. Quiroz, the only thing we found at that moment was mild azotemia dehydration.  Patient was admitted on observation status for IV fluids         Course of Principle Problem for Admission:    Patient received IV fluids renal function has significantly improved, she is awake and alert, have adjusted her pain medications to every 8 hours, I suggested to add tramadol to breakthrough, Dr. Quiroz does not want to add any other medication    Other Medical Problems Addressed in the Hospital:    Labs have been reviewed, we even do UA there is no evidence of infection, we had get case management involved to do with sickle therapy or home health with 3 times a day    CONSULTS:  Dr. Quiroz    Other Pertinent Lab Findings:  All labs reviewed all labs address renal function test back to normal no evidence of urinary tract infection    Pertinent/Significant Diagnostic Studies:  Mild azotemia that has been corrected with IV fluids, all other diagnostic tests have been reviewed and family has been informed    Special Treatments/Procedures:  IV fluids physical therapy Wound Care podiatrist evaluation and treatment    Disposition:  Home   Home with Home Health      Future  Scheduled Appointments:  Future Appointments   Date Time Provider Department Center   4/20/2023  9:00 AM Dong Vogt MD [id number]       Follow-up Plans from This Hospitalization:  Dr. Trevor Daley 1 week   Last CBC/BMP/HgbA1c (if applicable):  Recent Results (from the past 336 hour(s))   CBC with Differential    Collection Time: 04/13/23  3:49 AM   Result Value Ref Range    WBC 9.1 4.5 - 11.5 x10(3)/mcL    Hgb 11.0 (L) 12.0 - 16.0 g/dL    Hct 32.5 (L) 37.0 - 47.0 %    Platelet 159 130 - 400 x10(3)/mcL   CBC with Differential    Collection Time: 04/12/23  2:46 PM   Result Value Ref Range    WBC 8.5 4.5 - 11.5 x10(3)/mcL    Hgb 12.1 12.0 - 16.0 g/dL    Hct 36.6 (L) 37.0 - 47.0 %    Platelet 178 130 - 400 x10(3)/mcL     Recent Results (from the past 336 hour(s))   Basic metabolic panel    Collection Time: 04/13/23  3:49 AM   Result Value Ref Range    Sodium Level 142 136 - 145 mmol/L    Potassium Level 4.7 3.5 - 5.1 mmol/L    Carbon Dioxide 25 23 - 31 mmol/L    Glucose Level 87 82 - 115 mg/dL    Blood Urea Nitrogen 22.4 (H) 9.8 - 20.1 mg/dL    Creatinine 0.98 0.55 - 1.02 mg/dL    Calcium Level Total 9.1 8.4 - 10.2 mg/dL     Lab Results   Component Value Date    HGBA1C 5.1 02/08/2023       Discharge Medication List:     Medication List        START taking these medications      melatonin 3 mg tablet  Commonly known as: MELATIN  Take 2 tablets (6 mg total) by mouth nightly as needed for Insomnia.     ondansetron 8 MG Tbdl  Commonly known as: ZOFRAN-ODT  Take 1 tablet (8 mg total) by mouth every 8 (eight) hours as needed.            CONTINUE taking these medications      ascorbic acid (vitamin C) 1000 MG tablet  Commonly known as: VITAMIN C     aspirin 81 MG EC tablet  Commonly known as: ECOTRIN     cholecalciferol (vitamin D3) 125 mcg (5,000 unit) Tab     CRAN- MG ORAL     diclofenac sodium 1 % Gel  Commonly known as: VOLTAREN     ELDERBERRY FRUIT ORAL     ELIQUIS 2.5 mg Tab  Generic  drug: apixaban     furosemide 20 MG tablet  Commonly known as: LASIX     HYDROcodone-acetaminophen 5-325 mg per tablet  Commonly known as: NORCO  Take 1 tablet by mouth every 6 (six) hours as needed.     lactulose 10 gram/15 mL solution  Commonly known as: CHRONULAC  Take 15 mLs (10 g total) by mouth 3 (three) times daily.     nystatin powder  Commonly known as: MYCOSTATIN  APPLY TO AFFECTED AREA TOPICALLY TWICE A DAY     triamcinolone acetonide 0.1% 0.1 % cream  Commonly known as: KENALOG  APPLY TOPICALLY 2 (TWO) TIMES DAILY AS NEEDED (ECZEMA).     trolamine salicylate 10 % cream  Commonly known as: ASPERCREME     UP4 PROBIOTICS PLUS PREBIOTIC ORAL     zinc acetate 50 mg (zinc) Cap               Where to Get Your Medications        These medications were sent to [facility]/pharmacy #[id number] [address] [address]      Phone: [phone]   HYDROcodone-acetaminophen 5-325 mg per tablet  melatonin 3 mg tablet  ondansetron 8 MG Tbdl         Patient Instructions:  No discharge procedures on file.    Signing Physician:  Husam Mccray MD

## 2023-04-14 NOTE — PLAN OF CARE
04/14/23 1247   Discharge Assessment   Assessment Type Discharge Planning Assessment   Confirmed/corrected address, phone number and insurance Yes   Confirmed Demographics Correct on Facesheet   Source of Information family   Does patient/caregiver understand observation status   (inpatient)   Communicated CASSI with patient/caregiver Yes   Reason For Admission sycope   People in Home child(narendra), adult   Facility Arrived From: home   Do you expect to return to your current living situation? Yes   Do you have help at home or someone to help you manage your care at home? Yes   Who are your caregiver(s) and their phone number(s)? dgtrs Gillian Wagoner   Prior to hospitilization cognitive status: Unable to Assess   Current cognitive status: Not Oriented to Person;Not Oriented to Place;Not Oriented to Time   Walking or Climbing Stairs ambulation difficulty, requires equipment;ambulation difficulty, assistance 1 person   Dressing/Bathing bathing difficulty, assistance 1 person   Equipment Currently Used at Home bedside commode;raised toilet;walker, rolling;rollator   Readmission within 30 days? No   Patient currently being followed by outpatient case management? No   Do you currently have service(s) that help you manage your care at home? No   Do you take prescription medications? Yes   Do you have prescription coverage? Yes   Coverage medicare   Do you have any problems affording any of your prescribed medications? No   Who is going to help you get home at discharge? 2 dgtrs   How do you get to doctors appointments? family or friend will provide   Are you on dialysis? No   Discharge Plan A Home Health   Discharge Plan B Home Health   DME Needed Upon Discharge  none   Discharge Plan discussed with: Adult children   Discharge Barriers Identified None   Housing Stability   In the last 12 months, was there a time when you did not have a steady place to sleep or slept in a shelter (including now)? N    Transportation Needs   In the past 12 months, has lack of transportation kept you from medical appointments or from getting medications? no   In the past 12 months, has lack of transportation kept you from meetings, work, or from getting things needed for daily living? No   Food Insecurity   Within the past 12 months, you worried that your food would run out before you got the money to buy more. Never true   Within the past 12 months, the food you bought just didn't last and you didn't have money to get more. Never true   OTHER   Name(s) of People in Home pt vilma Valdez, Gillian Sinclair , sundar     Current with [facility], rec. PTand SN, disch home today and will resume services

## 2023-04-14 NOTE — PT/OT/SLP PROGRESS
Physical Therapy      Patient Name:  Ally Valdez   MRN:  [id number]    Attempted to see pt this afternoon, pt up in chair. Pt refusing to stand up for evaluation, resisting assistance for any mobility. Unable to perform mobility for evaluation. Will f/u tomorrow.

## 2023-04-14 NOTE — PLAN OF CARE
Referral and orders sent to [facility] via [facility] for resumption of care. Made them aware patient is discharging today.

## 2023-04-17 NOTE — TELEPHONE ENCOUNTER
Pt daughter stated pt is taking:  Taking Tylenol arthritis 650mg, 1 tablet every 8 hrs, prn for pain.  Ciprofloxacin hcl 5mg tablets, 1 tablet twice daily  Doxycycline hyclate 100mg cap, 1 cap twice daily

## 2023-04-17 NOTE — PROGRESS NOTES
C3 nurse spoke with Ally Valdez daughter Yvonne for a TCC post hospital discharge follow up call. The patient has a scheduled HOSFU appointment with Dong Vogt MD  on 4/20/23 @ 9:00am.

## 2023-04-17 NOTE — TELEPHONE ENCOUNTER
----- Message from Dong Vogt MD sent at 2023 11:15 AM CDT -----  So did they talk to the dr on call?  ----- Message -----  From: Pascual Causey MA  Sent: 2023  11:04 AM CDT  To: Dong Vogt MD      ----- Message -----  From: Bernardo Moreno  Sent: 4/15/2023  10:41 AM CDT  To: Milo QUACH received this call at 10:35 am on Saturday 4/15. I did page doctor on call.     Patient Name : Nazia Valdez      : 1935    Phone Number:  # [phone] Person who called was Yvonne Quesada's Daughter     Reason for Call:  She told me her mother was released from hospital yesterday and has some question.  She told me her they noticed that her mother has a large boil on her stomach and she had not had a bowel movement in a few days she told me.  She wanted to speak with the doctor on call for Dr. Murray.     PCP : Dr. Dong Vogt

## 2023-04-18 NOTE — TELEPHONE ENCOUNTER
Pt's daughter states pt had her toe amputated on 4/11/23. She had to go to the ED the day after due to being unresponsive from too many narcotics. She was taking Norco q6h, but now she is taking Norco q8h. Pt is in severe pain (10/10); she can be heard yelling in the background of the call. Daughter states there is a dressing in place over the incision and they cannot see it. Temperature is 97.9 degrees axillary. Care advice is to go to ED now.     Reason for Disposition   [1] SEVERE post-op pain (e.g., excruciating, pain scale 8-10) AND [2] not controlled with pain medications    Additional Information   Negative: Sounds like a life-threatening emergency to the triager   Negative: [1] Widespread rash AND [2] bright red, sunburn-like   Negative: [1] SEVERE headache AND [2] after spinal (epidural) anesthesia   Negative: [1] Vomiting AND [2] persists > 4 hours   Negative: [1] Vomiting AND [2] abdomen looks much more swollen than usual   Negative: [1] Drinking very little AND [2] dehydration suspected (e.g., no urine > 12 hours, very dry mouth, very lightheaded)   Negative: Patient sounds very sick or weak to the triager   Negative: Sounds like a serious complication to the triager   Negative: Fever > 100.4 F (38.0 C)    Protocols used: Post-Op Symptoms and Nfenpccco-T-IL

## 2023-04-18 NOTE — ED PROVIDER NOTES
Encounter Date: 2023       History     Chief Complaint   Patient presents with    Post-op Problem     L great toe amputation on  by dr. Quiroz. Family reports pt having inc confusion/agitation since last week. Family reports change in pain meds after OD last week. Hx of alzheimers, dementia. GCS 14     A 87-year-old female with a PMH of Afib on Eliquis, Dementia, PAD, s/p L. 2nd toe amputation by Dr. Quiroz 2023 presents for evaluation fever. History obtained from Daughter, Yvonne at the bedside due to patient's Dementia. Tmax on 102.F 2023. Seen by her Podiatrist on  started on Cipro and Doxy and given ED return precaution for recurrent fevers. Seen by HH nurse yesterday. Daughter brought Mother for evaluation due to a Temp of 99.4 this morning. There is pain in the left foot, has been dressing since HH visit yesterday. No reported drainage from wound    The history is provided by a relative and a caregiver. No  was used.   Review of patient's allergies indicates:   Allergen Reactions    Lisinopril Swelling    Fluconazole      Drug interaction w/Eliquis     Past Medical History:   Diagnosis Date    Alzheimer disease     Dementia     Foot ulcer, left     Hypertension     Paroxysmal atrial fibrillation     PVD (peripheral vascular disease)      Past Surgical History:   Procedure Laterality Date     SECTION  1973    Stent placement right thigh  2021    TOE AMPUTATION Right 2023     Family History   Problem Relation Age of Onset    Cancer Mother     Alzheimer's disease Father      Social History     Tobacco Use    Smoking status: Never    Smokeless tobacco: Never   Substance Use Topics    Alcohol use: Never    Drug use: Never     Review of Systems   Constitutional:  Negative for activity change and appetite change.   HENT:  Negative for congestion, rhinorrhea and sore throat.    Respiratory:  Negative for cough, shortness of breath and wheezing.     Cardiovascular:  Negative for chest pain.   Gastrointestinal:  Negative for abdominal pain, blood in stool and diarrhea.   Genitourinary:  Negative for dysuria.   Skin:  Negative for rash.   Neurological:  Negative for headaches.     Physical Exam     Initial Vitals [04/18/23 0935]   BP Pulse Resp Temp SpO2   124/72 100 16 97.3 °F (36.3 °C) 99 %      MAP       --         Physical Exam    Vitals reviewed.  Constitutional: No distress.   HENT:   Head: Normocephalic and atraumatic.   Neck: Neck supple.   Normal range of motion.  Cardiovascular:  An irregularly irregular rhythm present.           No murmur heard.  Pulses:       Radial pulses are 2+ on the right side and 2+ on the left side.        Dorsalis pedis pulses are 2+ on the right side and 2+ on the left side.   LE pulses not palpable but dopperable   Pulmonary/Chest: Breath sounds normal. No respiratory distress.   Abdominal: Abdomen is soft and protuberant. Bowel sounds are normal. She exhibits no distension. A surgical scar is present. There is no abdominal tenderness. A hernia is present. Hernia confirmed positive in the umbilical area (soft and easily reducible).   Musculoskeletal:         General: No edema. Normal range of motion.      Cervical back: Normal range of motion and neck supple.     Lymphadenopathy:     She has no cervical adenopathy.   Neurological: She is alert.   Skin: Skin is warm and dry. Capillary refill takes less than 2 seconds.   Left foot erythematous, no warmth. 2nd toe amputation sutures in place with slight dehiscence and minimal drainage.    Psychiatric: She has a normal mood and affect.         ED Course   Procedures  Labs Reviewed   COMPREHENSIVE METABOLIC PANEL - Abnormal; Notable for the following components:       Result Value    Chloride 108 (*)     Albumin Level 3.1 (*)     Globulin 3.8 (*)     Albumin/Globulin Ratio 0.8 (*)     All other components within normal limits   C-REACTIVE PROTEIN - Abnormal; Notable for the  following components:    C-Reactive Protein 70.80 (*)     All other components within normal limits   CBC WITH DIFFERENTIAL - Abnormal; Notable for the following components:    RBC 3.72 (*)     Hgb 10.5 (*)     Hct 31.4 (*)     MPV 10.5 (*)     All other components within normal limits   WOUND CULTURE (OLG)   CBC W/ AUTO DIFFERENTIAL    Narrative:     The following orders were created for panel order CBC auto differential.  Procedure                               Abnormality         Status                     ---------                               -----------         ------                     CBC with Differential[[id number]]        Abnormal            Final result                 Please view results for these tests on the individual orders.   URINALYSIS, REFLEX TO URINE CULTURE          Imaging Results              X-Ray Foot Complete Left (In process)                      Medications   HYDROcodone-acetaminophen 5-325 mg per tablet 1 tablet (0 tablets Oral Hold 4/18/23 1100)                 ED Course as of 04/18/23 1145   Tue Apr 18, 2023   1132 Discussed case with Dr. Quiroz states preliminary wound culture collected 4/16/2023 positive for pseudomonas recommends discharge continue Cipro and Doxy with follow up in this clinic on 4/18/2023 [CE]      ED Course User Index  [CE] Radha Chilel MD             Medical Decision Making  86 yo F with recent L 2nd toe amputation 4/11/2023  presents with concern for fever. Initiated on Cipro and Doxy by podiatrist 4/16. Left foot erythematous, wound with minimal drainage      Ddx including but not limited to left foot cellulitis, abscess, osteomyelitis     Obtaining cmp, cbc, crp, ua, wound culture, and left foot xr to further evaluate    Amount and/or Complexity of Data Reviewed  Independent Historian: caregiver     Details: per HPI  External Data Reviewed: labs and notes.     Details: Per chart check patient readmitted on 4/12/2023 opiate induced mental status change,  discharged 4/14/2023 with instructions to take Norco 5 q8hr prn. On discharge no leukocytosis  Labs: ordered. Decision-making details documented in ED Course.  Radiology: ordered. Decision-making details documented in ED Course.  Discussion of management or test interpretation with external provider(s): Discussed with patient's Podiatrist, Dr. Quiroz    Risk  Prescription drug management.          Clinical Impression:   Final diagnoses:  [M79.672] Left foot pain        ED Disposition Condition    Discharge Stable          ED Prescriptions    None       Follow-up Information       Follow up With Specialties Details Why Contact Info    Alex Quiroz DPM Podiatry On 4/20/2023  [address]  [address]  [facility] [address]  [phone]               Radha Chilel MD  Resident  04/18/23 1148

## 2023-05-01 PROBLEM — L08.9 LEFT FOOT INFECTION: Status: ACTIVE | Noted: 2023-01-01

## 2023-05-03 NOTE — TELEPHONE ENCOUNTER
Spoke with the pt daughter, pt is still in LTAC. Did inform the daughter to follow up with  per Dr. Murray for pt to have a hospital bed for at home.

## 2023-05-03 NOTE — TELEPHONE ENCOUNTER
----- Message from Annmarie Batista sent at 5/3/2023  9:03 AM CDT -----  Regarding: Patient Call  .Type:  Patient Returning Call    Who Called:pt's daughterLiz   Who Left Message for Patient:office staff  Does the patient know what this is regarding?:pt is in LTAC  Would the patient rather a call back or a response via [facility]?   Best Call Back Number:[phone]  Additional Information: pt's daughter is calling about for orders for an hospital bed for her mother, please advise

## 2023-05-11 NOTE — TELEPHONE ENCOUNTER
----- Message from Clair Childers sent at 5/11/2023  1:15 PM CDT -----  Regarding: Medical Advice  Type:  Needs Medical Advice    Who Called: Yvonne  Would the patient rather a call back or a response via [facility]?   Best Call Back Number: [phone]  Additional Information: Yvonne called stating she need orders for the right bed for her mom.  She states they need full rails the company delivered a bed without full rails. She is requesting a call back.

## 2023-05-15 NOTE — TELEPHONE ENCOUNTER
----- Message from Steph Schuler sent at 5/15/2023 12:39 PM CDT -----  Regarding: advice  Type:  Needs Medical Advice    Who Called: pt's daughter Yvonne  Would the patient rather a call back or a response via [facility]? C/b  Best Call Back Number: [phone]  Additional Information: pt's daughter s/w [facility] and they never received order for bed.  Please resend as soon as possible  Please have amarilis contact Yvonne

## 2023-05-18 NOTE — TELEPHONE ENCOUNTER
----- Message from Annmarie Batista sent at 5/18/2023  9:49 AM CDT -----  Regarding: Patient Returning Call  .Type:  Patient Returning Call    Who Called:pt's daughter   Who Left Message for Patient:Annika  Does the patient know what this is regarding?:returning an missed call  Would the patient rather a call back or a response via [facility]?   Best Call Back Number:[phone]  Additional Information: pt's daughter is returning call to Annika       
Spoke to [facility] and they stated that they need a receipt of return for the hospital bed from [facility] and the last OV note. I have re-faxed the OV note.   I ATC pt's daughter to let her know that the need the receipt for the returned bed LVM to call back.   
Not applicable

## 2023-05-22 PROBLEM — N39.0 UTI (URINARY TRACT INFECTION): Status: RESOLVED | Noted: 2023-01-01 | Resolved: 2023-01-01

## 2023-07-22 NOTE — ED PROVIDER NOTES
Encounter Date: 2023       History     Chief Complaint   Patient presents with    Loss of Consciousness     Pt to ER via [facility] for syncope.  Was in her wheelchair and passed out.  Pt had a cardiac arrest 6 days ago and was seen at [facility] (family request kevinal at [facility] today).  Had holter monitor placed 5 days ago.  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  What apparently was in her wheelchair and passed out.  I reviewed the LTAC discharge summary.  She was seen at [facility] on  treated with Zosyn and Zyvox for the osteomyelitis Zosyn completed on  per records.  Zyvox finished after 7 days.  Patient was discharged to SNF.  Patient has history of dementia she is not able to answer any of my questions at present she is wide awake and alert and looks around the room      Review of patient's allergies indicates:   Allergen Reactions    Lisinopril Swelling    Fluconazole      Drug interaction w/Eliquis     Past Medical History:   Diagnosis Date    Alzheimer disease     Dementia     Foot ulcer, left     Hypertension     Paroxysmal atrial fibrillation     PVD (peripheral vascular disease)      Past Surgical History:   Procedure Laterality Date     SECTION  1973    Stent placement right thigh  2021    TOE AMPUTATION Right 2023     Family History   Problem Relation Age of Onset    Cancer Mother     Alzheimer's disease Father      Social History     Tobacco Use    Smoking status: Never    Smokeless tobacco: Never   Substance Use Topics    Alcohol use: Never    Drug use: Never     Review of Systems   Unable to perform ROS: Dementia     Physical Exam     Initial Vitals [23 1715]   BP Pulse Resp Temp SpO2   (!) 139/58 65 18 97.2 °F (36.2 °C) 100 %      MAP       --          Physical Exam    Nursing note and vitals reviewed.  Constitutional: She appears well-developed and well-nourished. No distress.   HENT:   Head: Normocephalic and atraumatic.   Eyes: Conjunctivae and EOM are normal. Pupils are equal, round, and reactive to light.   Cardiovascular:  Normal rate and intact distal pulses.           Pulmonary/Chest: No respiratory distress. She has no rhonchi.   Abdominal: Abdomen is soft. Bowel sounds are normal. There is no abdominal tenderness. There is no rebound and no guarding.   Musculoskeletal:         General: No edema.      Comments: Air boots the bilateral lower extremities     Neurological: She is alert.   Not able to tell me her name   Skin: Skin is warm and dry.   Psychiatric: She has a normal mood and affect.       ED Course   Procedures  Labs Reviewed   B-TYPE NATRIURETIC PEPTIDE - Abnormal; Notable for the following components:       Result Value    Natriuretic Peptide 203.7 (*)     All other components within normal limits   COMPREHENSIVE METABOLIC PANEL - Abnormal; Notable for the following components:    Carbon Dioxide 22 (*)     Glucose Level 124 (*)     Creatinine 1.10 (*)     Albumin Level 3.1 (*)     Globulin 4.3 (*)     Albumin/Globulin Ratio 0.7 (*)     All other components within normal limits   LACTIC ACID, PLASMA - Abnormal; Notable for the following components:    Lactic Acid Level 2.4 (*)     All other components within normal limits   PROTIME-INR - Abnormal; Notable for the following components:    PT 16.6 (*)     INR 1.36 (*)     All other components within normal limits   URINALYSIS, REFLEX TO URINE CULTURE - Abnormal; Notable for the following components:    Protein, UA 2+ (*)     Blood, UA Trace (*)     Leukocyte Esterase, UA 1+ (*)     All other components within normal limits   APTT - Abnormal; Notable for the following components:    PTT 40.8 (*)     All other components within normal limits   CBC WITH DIFFERENTIAL - Abnormal; Notable for the  following components:    RBC 4.14 (*)     Hct 36.1 (*)     IG# 0.05 (*)     All other components within normal limits   URINALYSIS, MICROSCOPIC - Abnormal; Notable for the following components:    WBC, UA 13 (*)     Yeast, UA Occasional (*)     All other components within normal limits   COVID/FLU A&B PCR - Normal    Narrative:     The Xpert Xpress SARS-CoV-2/FLU/RSV plus is a rapid, multiplexed real-time PCR test intended for the simultaneous qualitative detection and differentiation of SARS-CoV-2, Influenza A, Influenza B, and respiratory syncytial virus (RSV) viral RNA in either nasopharyngeal swab or nasal swab specimens.         DRUG SCREEN, URINE (BEAKER) - Normal    Narrative:     Cut off concentrations:    Amphetamines - 1000 ng/ml  Barbiturates - 200 ng/ml  Benzodiazepine - 200 ng/ml  Cannabinoids (THC) - 50 ng/ml  Cocaine - 300 ng/ml  Fentanyl - 1.0 ng/ml  MDMA - 500 ng/ml  Opiates - 300 ng/ml   Phencyclidine (PCP) - 25 ng/ml    Specimen submitted for drug analysis and tested for pH and specific gravity in order to evaluate sample integrity. Suspect tampering if specific gravity is <1.003 and/or pH is not within the range of 4.5 - 8.0  False negatives may result form substances such as bleach added to urine.  False positives may result for the presence of a substance with similar chemical structure to the drug or its metabolite.    This test provides only a PRELIMINARY analytical test result. A more specific alternate chemical method must be used in order to obtain a confirmed analytical result. Gas chromatography/mass spectrometry (GC/MS) is the preferred confirmatory method. Other chemical confirmation methods are available. Clinical consideration and professional judgement should be applied to any drug of abuse test result, particularly when preliminary positive results are used.    Positive results will be confirmed only at the physicians request. Unconfirmed screening results are to be used only for  medical purposes (treatment).        ALCOHOL,MEDICAL (ETHANOL) - Normal   MAGNESIUM - Normal   TROPONIN I - Normal   BLOOD CULTURE [facility]LG   BLOOD CULTURE [facility]   CULTURE, URINE   CBC W/ AUTO DIFFERENTIAL    Narrative:     The following orders were created for panel order CBC auto differential.  Procedure                               Abnormality         Status                     ---------                               -----------         ------                     CBC with Differential[[id number]]        Abnormal            Final result                 Please view results for these tests on the individual orders.   LACTIC ACID, PLASMA          Imaging Results              X-Ray Chest AP Portable (Final result)  Result time 07/22/23 19:03:13      Final result by Zachery Barrera MD (07/22/23 19:03:13)                   Impression:      No acute cardiopulmonary abnormality.      Electronically signed by: Zahcery Barrera MD  Date:    07/22/2023  Time:    19:03               Narrative:    EXAMINATION:  Single view chest radiograph.    CLINICAL HISTORY:  Syncope and collapse    TECHNIQUE:  Single view of the chest.    COMPARISON:  Chest radiograph 06/29/2023.    FINDINGS:  The lungs are clear without consolidation or effusion.  There is no pneumothorax.  The left upper extremity PICC is unchanged.  The cardiac silhouette is normal in size.  There is no acute osseous abnormality.                                       CT Head Without Contrast (Final result)  Result time 07/22/23 18:45:12      Final result by Zachery Barrera MD (07/22/23 18:45:12)                   Impression:      Sequela of chronic small vessel ischemic disease without acute intracranial abnormality.      Electronically signed by: Zachery Barrera MD  Date:    07/22/2023  Time:    18:45               Narrative:    EXAMINATION:  CT HEAD WITHOUT CONTRAST    CLINICAL HISTORY:  Syncope, recurrent;    TECHNIQUE:  Axial images of the head were obtained without IV contrast  administration.  Coronal and sagittal reconstructions were provided.  Three dimensional and MIP images were obtained and evaluated.  Total DLP was 1625 mGy-cm. Dose lowering technique and automated exposure control were utilized for this exam.    COMPARISON:  CT of the head 07/18/2023.    FINDINGS:  There is normal brain formation.  There is diffuse cerebral atrophy.  There is periventricular and subcortical white matter hypodensity.  There is no hemorrhage, hydrocephalus, or midline shift.  There is no cytotoxic or vasogenic edema.  There is no intra or extra-axial fluid collection.  There is no herniation.    The calvarium is intact.  There is no fracture.  The bilateral orbits are normal.  The paranasal sinuses and mastoid air cells are normally developed and free of disease.                                       Medications - No data to display              ED Course as of 07/23/23 0029   Sat Jul 22, 2023   1731 1723. 84 beats per minute AFib right bundle-branch block [LF]   2325 Patient had a recent echocardiogram it is on record I reviewed that report.  Discussed the case with [facility] reviewed the records they did not get any alerts for any abnormalities from the cardiac monitor which she has in place right now.  She also had a monitor in December that showed AFib without any other issues.  She had carotid ultrasounds April of 2022 that showed 1-39% blockage bilaterally.  CT brain is unremarkable here a workup has been reassuring.  I see no indication for admission at this time discussed case with the daughter she has been awake alert no distress and no treatment some monitor since she has been here.  Will discharge back to [facility], continue outpatient syncope workup with [facility] as scheduled [LF]      ED Course User Index  [LF] Skyler Mercado MD                 Clinical Impression:   Final diagnoses:  [R55] Syncope (Primary)  [I48.20] Chronic atrial fibrillation        ED Disposition Condition     Discharge Stable          ED Prescriptions    None       Follow-up Information       Follow up With Specialties Details Why Contact Info    Dong Vogt MD Internal Medicine Schedule an appointment as soon as possible for a visit   [address] [place] [address]  [phone]      Ralph Veloz MD Cardiology Schedule an appointment as soon as possible for a visit   [address]  [facility] [address]  [phone]               Skyler Mercado MD  07/23/23 0029

## 2023-07-24 NOTE — TELEPHONE ENCOUNTER
----- Message from Cassidy Saldana sent at 2023  1:16 PM CDT -----  Regarding: syncope episodes  23 11:58a TAKEN    To:          [facility]  From:        Bhavya  Phone:       [phone]  Patient:     Alcira Valdez  :         [birth year]  Hospital:    [facility]  Room:        N/A  RegDr:      Dr. Todd  Ref:         Patient has been having episodes of syncope.  Needs to schedule an urgent appointment.     Subject:          Appointment Call  ClrID:    [id number]

## 2023-07-26 PROBLEM — R55 SYNCOPE: Chronic | Status: ACTIVE | Noted: 2023-01-01

## 2023-07-26 PROBLEM — F02.80 LATE ONSET ALZHEIMER'S DISEASE WITHOUT BEHAVIORAL DISTURBANCE: Status: ACTIVE | Noted: 2023-01-01

## 2023-07-26 PROBLEM — G30.1 LATE ONSET ALZHEIMER'S DISEASE WITHOUT BEHAVIORAL DISTURBANCE: Status: ACTIVE | Noted: 2023-01-01

## 2023-07-26 PROBLEM — F02.80 LATE ONSET ALZHEIMER'S DISEASE WITHOUT BEHAVIORAL DISTURBANCE: Chronic | Status: ACTIVE | Noted: 2023-01-01

## 2023-07-26 PROBLEM — G30.1 LATE ONSET ALZHEIMER'S DISEASE WITHOUT BEHAVIORAL DISTURBANCE: Chronic | Status: ACTIVE | Noted: 2023-01-01

## 2023-07-26 NOTE — TELEPHONE ENCOUNTER
----- Message from Cassidy Saldana sent at 2023  1:04 PM CDT -----  Regarding: cancel appt, syncopal episode  23 12:11p TAKEN    From:        Hazel  Phone:       [phone]  Patient:     Ally Valdez  :         1935   Hospital:    [facility]  RegDr:      Not Sure  Ref:         Cancel appointment at 1:30pm. She was sent to the ER, she had a syncopal episode. Please call     Subject:          Appointment Call  ClrID:    [id number]

## 2023-08-02 NOTE — PROGRESS NOTES
Subjective     Patient ID: Ally Valdez is a 87 y.o. female.    Chief Complaint: Alzheimer Disease and Syncope    HPI  Here with daughter...  Pt sleepy & [place], unable to provide any details needed for the visit    Syncope... several episodes over the last few months  Was seen in the ER here a few days ago... Was in her WC at the NH & passed out  Some are related to position changes, some are not  Not sure of details of orthostatic v/s  HR can reportedly be high with some events    Has had vagal response with straining to have bowel movement    Was admitted at [facility] most recently  Ronak was consulted (excerpt from his note below):  Syncopal spells in the setting of orthostatic hypotension  Dementia without behavioral disturbance  Generalized cortical atrophy  Debility/physical deconditioning    -Spells are very likely in the setting of her orthostatic hypotension, debility and physical deconditioning. Will defer to her established providers.  -EEG consistent with encephalopathy, no epileptiform or interictal discharges. No indication for initiation of AED  -Nothing further to add from an acute inpatient neurological standpoint. Advised to follow-up with established neurologist    Not on memory meds  Was dx >10 yrs ago  H/o a.fib, on eliquis 2.5mg bid  Trazodone was stopped  On melatonin 6mg qHS, daughter states she doesn't become more awake until around 1pm or so.  Her total liquid intake over the course of a day is 24-36 oz.    On keflex at present, but has been off & on abx for the last 3 mo for treatment of foot wound & then osteomyelitis (pseudomonas)    Lives @ [facility]    Just released from [facility] on Monday  Saw [facility] & they put her on a monitor; not sure how much longer she'll have it  No issues since getting out of the hospital      Review of Systems  A 14pt ROS was reviewed & is negative unless otherwise documented in the HPI       Objective     Physical Exam  GENERAL: NAD, calm, cooperative,  appropriate  Asleep in , wakes up sporadically  Well groomed  +++[place]  RESP: CTAB  HEART: RRR  MENTAL STATUS: JUSTIN; she only told me her name  SPEECH/LANGUAGE: clear, fluent  CN:  gaze conjugate  Tongue protrudes midline  Motor: gen weakness/deconditioning  Cerebellar: no tremor or dysmetria  Sensory: normal to tactile stim/vibration  Gait: not observed, in     [facility] admission reviewed in the chart  EEG results reviewed  CTH -ve for acute findings       Assessment and Plan     1. Late onset Alzheimer's disease without behavioral disturbance  Advanced AD    2. Syncope, unspecified syncope type  Syncope vs. seizure      PLAN:  Likely syncope 2.2 orthostatic hypotension  Increase fluid intake  Depending on what [facility] says, may try small dose of keppra in the future.  Daughter to update us after her appt w/ [facility]  Decrease melatonin dose to 3mg po qHS prn insomnia  F/u prn    Gretel Garrido, AGASONIAP-BC     Follow up syncope vs. seizure.

## 2023-08-03 NOTE — TELEPHONE ENCOUNTER
Raudel with [facility] called reporting the patient was seen in the office today by CORA Majano. States her melatonin medication was decrease to 3 mg PO qHS prn. States the patient's family is requesting to keep patient taking the melatonin 3 mg at 5 p.m. every day. Requesting a call back to further discuss the best option. Please advise.

## 2023-08-03 NOTE — TELEPHONE ENCOUNTER
Rcv'd call back  Verbal order provided - Advised ok pr NP for 5pm dose time of Melatonin 3mg (decreased dose from 6 mg 2nd to increased sedation)

## 2023-08-03 NOTE — PATIENT INSTRUCTIONS
"Patient Education       Seizures   The Basics   Written by the doctors and editors at [facility]   What are seizures? -- Seizures are waves of abnormal electrical activity in the brain. Seizures can make you pass out, or move or behave strangely. Most seizures last only a few seconds or minutes.  Epilepsy is a condition that causes people to have repeated seizures. But not everyone who has had a seizure has epilepsy. Problems such as low blood sugar or infection can also cause seizures. Other problems such as anxiety or fainting spells can cause events that look like seizures.  What are the symptoms of a seizure? -- There are different kinds of seizures. Each causes a different set of symptoms.  People who have "tonic clonic" or "grand mal" seizures often get stiff and then have jerking movements. People who have other types of seizures have less dramatic changes. For instance, some people have shaking movements in just 1 arm or in a part of their face. Other people suddenly stop responding and stare for a few seconds.  Should I see a doctor or nurse if I have a seizure? -- If you have never had a seizure before and you have one, you (or whoever is with you) should call for an ambulance (in the [place] and [place], dial 9-1-1). Having a seizure can be a sign that something is wrong with your brain.  How are seizures treated? -- The right treatment for seizures depends on what is causing them. If you have seizures because of an infection, you will probably need treatments to get rid of the infection. On the other hand, if you have repeated seizures because of epilepsy, you will probably need anti-seizure medicines, also called "anti-convulsants."  People sometimes need to try different medicines before they find a treatment that works well. Seizures can be hard to control. But if you work with your doctor, chances are good that you will find a treatment that works.  Do anti-seizure medicines cause side effects? -- Yes. " "Anti-seizure medicines can cause side effects. They can make you feel tired or clumsy, or cause other problems. If you are bothered by side effects, tell your doctor about it. They can work with you to find the medicine or dose that causes the fewest problems. Most of the side effects from these medicines are mild. But there are two side effects that are very serious but rare:  Anti-seizure medicines can cause a rare but serious skin rash. Tell your doctor or nurse right away if you notice a new rash while taking an anti-seizure medicine.  Anti-seizure medicines can increase the risk of becoming suicidal (wanting to kill yourself). Tell your doctor or nurse right away if you start to feel depressed or have thoughts of harming yourself.  What if anti-seizure medicines do not work for me? -- If you keep having seizures even after trying different medicines, you might have other options. Some people can have surgery to remove the small part of their brain that is causing seizures. Others get a device called a "vagus nerve stimulator" put in their chest to help control seizures.  A special diet, called the "ketogenic diet," might be helpful for some people. This diet involves eating foods that are high in fat but avoiding carbohydrates. If you are interested in trying this kind of diet, your doctor or nurse can talk to you about how to do this safely.   What can I do to keep myself safe? -- Until you have your seizures under control, do not drive. The laws that say when a person with seizures can drive are different depending on where the person lives. Ask your doctor if you can safely drive and about the laws where you live.  Also, if your seizures are not under control, make sure to take other safety steps. For example, do not swim without someone else nearby who could help you if you started having a seizure. And avoid activities that could result in you falling from a height.  How can I reduce my chances of having " more seizures? -- You can:  Take your medicines exactly as directed - at the right times, and at the right doses.  Tell your doctor about any side effects you have. That way you can work together to find the best medicine for you.  Be careful not to let your prescription run out. (Stopping anti-seizure medicine suddenly can put you at risk of seizures.)  While on anti-seizure medicines, check with your doctor before starting any new medicines. Anti-seizure medicines can interact with prescription and non-prescription medicines, and with herbal drugs. Mixing them can increase side effects or make them not work as well.  Avoid alcohol. Alcohol can increase the risk of seizures, affect the way seizure medicines work, and increase side effects from anti-seizure medicines.  What should other people do if they see me having a seizure? -- Ask your doctor what your family members, friends, or coworkers should do if you have a seizure. Some people will have seizures from time to time, and they might not need to see a doctor every time. But if you have a seizure that lasts longer than 5 minutes or if you do not wake up after a seizure, someone should call for an ambulance (in the [place] and [place], dial 9-1-1).  Other people should not try to put anything in your mouth while you are having a seizure. But they should make sure you do not bang against any hard surfaces.  What if I want to get pregnant? -- If you take anti-seizure medicines, speak to your doctor or nurse before you start trying to get pregnant. Some anti-seizure medicines can hurt an unborn baby. You might need to switch medicines before you get pregnant.  All topics are updated as new evidence becomes available and our peer review process is complete.  This topic retrieved from [employer] on: Sep 21, 2021.  Topic 86131 Version 17.0  Release: 29.4.2 - C29.263  © 2021 UpToDate, Inc. and/or its affiliates. All rights reserved.  Consumer Information Use and Disclaimer    This information is not specific medical advice and does not replace information you receive from your health care provider. This is only a brief summary of general information. It does NOT include all information about conditions, illnesses, injuries, tests, procedures, treatments, therapies, discharge instructions or life-style choices that may apply to you. You must talk with your health care provider for complete information about your health and treatment options. This information should not be used to decide whether or not to accept your health care provider's advice, instructions or recommendations. Only your health care provider has the knowledge and training to provide advice that is right for you. The use of this information is governed by the [employer] End User License Agreement, available at https://www.[employer]/en/solutions/behaview/about/cole.The use of [employer] content is governed by the [employer] Terms of Use. ©2021 UpToDate, Inc. All rights reserved.  Copyright   © 2021 UpToDate, Inc. and/or its affiliates. All rights reserved.

## 2023-08-03 NOTE — TELEPHONE ENCOUNTER
"Attempted call x 3 to number provided  "Call cannot be completed as dialed" .  " Detail Level: Zone

## 2023-08-15 NOTE — TELEPHONE ENCOUNTER
----- Message from Felice Proctor sent at 8/15/2023 10:08 AM CDT -----  .Type:  Needs Medical Advice    Who Called: pt's daughter  Symptoms (please be specific):    How long has patient had these symptoms:    Pharmacy name and phone #:    Would the patient rather a call back or a response via [facility]? Call back  Best Call Back Number: [phone]  Additional Information: calling to inquire if there are blood orders for the upcoming appt 08/16/23

## 2023-08-17 NOTE — TELEPHONE ENCOUNTER
Pt just had labs on 8/1/23 and insurance would not pay for anything else this close. I called pt's daughter Yvonne and left detailed VM stating this. Pt's daughter identified herself on VM message.

## 2023-08-17 NOTE — TELEPHONE ENCOUNTER
----- Message from Brandi Gallardo sent at 8/17/2023 10:57 AM CDT -----  Regarding: med adv  Type:  Needs Medical Advice    Who Called: Yvonne, patient's daughter  Would the patient rather a call back or a response via [facility]?   Best Call Back Number: [phone]  Additional Information: Yvonne wants to know if her mother has blood work to do prior to her upcoming appointment and if so how will it get done now that the patient is in the nursing home? Please call Yvonne back.

## 2023-08-21 PROBLEM — I70.25 ATHEROSCLEROSIS OF NATIVE ARTERIES OF OTHER EXTREMITIES WITH ULCERATION: Status: ACTIVE | Noted: 2023-01-01

## 2023-08-21 NOTE — ASSESSMENT & PLAN NOTE
Patient recent hospital admit for syncope heart rate in the office at 42 seems as though the nursing home is giving her Toprol but only if her pulse is at 60 patient is wheelchair-bound at current and has not been out the wheelchair in several weeks discussed with the daughter that I am not sure she will get better but that we can hold the Toprol and if the heart rate is higher than 90 then we can give it but I do think that the bradycardia is going to increase her risk for further syncopal episodes especially if she is able to stand and ambulate.  We discussed quality of life and potentially further advanced directives with DNR however both daughters agree that they would still like life-sustaining measures performed.  I reviewed all her medicines advised for further PT and OT as tolerated of course not sure how much more were going to rehab this patient I think this is probably as good as it gets.  Consult outpatient case management to see if we can get a sitter service maybe arranged they do have Medicaid she is currently a nursing home resident they did have this service with a sitter that Medicaid was painful when they were at home.  Right now 1 of the daughters is staying with her at the nursing home every night of the week.

## 2023-08-21 NOTE — PROGRESS NOTES
Subjective:      Patient ID: Ally Valdez is a 88 y.o. female.    Chief Complaint: Follow-up      HPI:  88-year-old female history of atrial fibrillation, diabetes mellitus, dementia and vasovagal syncope.   Osteomyelitis to right foot, on Keflex chronically follows up with Infectious Disease at the end of the month  Currently living at [facility] as a resident  142lbs today  Daughter states that she is eating well, food is not that great  She is wheelchair-bound and has not ambulated in several weeks  Labs from  reviewed, stable  Heart rate at 42      Past Medical History:  Past Medical History:   Diagnosis Date    Foot ulcer, left     Hypertension     Paroxysmal atrial fibrillation     PVD (peripheral vascular disease)      Past Surgical History:   Procedure Laterality Date     SECTION  1973    Stent placement right thigh  2021    TOE AMPUTATION Right 2023     Review of patient's allergies indicates:   Allergen Reactions    Lisinopril Swelling    Fluconazole Other (See Comments)     Drug interaction w/Eliquis  Bleeding      Current Outpatient Medications on File Prior to Visit   Medication Sig Dispense Refill    ascorbic acid, vitamin C, (VITAMIN C) 1000 MG tablet Take 2 tablets by mouth every morning.      aspirin (ECOTRIN) 81 MG EC tablet Take 81 mg by mouth 3 (three) times a week.      cephALEXin (KEFLEX) 500 MG capsule Take 1 capsule (500 mg total) by mouth every 12 (twelve) hours. 60 capsule 3    cholecalciferol, vitamin D3, 125 mcg (5,000 unit) Tab Take 5,000 Units by mouth once daily.      cholestyramine-aspartame (QUESTRAN LIGHT) 4 gram PwPk Take 1 packet (4 g total) by mouth 2 (two) times daily. 120 packet 0    cholestyramine-aspartame (QUESTRAN/PREVALITE LIGHT) 4 gram Powd       diclofenac sodium (VOLTAREN) 1 % Gel Apply topically.      ELIQUIS 2.5 mg Tab Take 1 tablet (2.5 mg total) by mouth 2 (two) times daily. 180 tablet 3    fluticasone propionate (FLONASE) 50  mcg/actuation nasal spray 1 spray by Nasal route once daily.      Lactobacillus acidoph-pectin 75 million cell -100 mg Cap Take 2 capsules by mouth Daily.      lactulose (CHRONULAC) 10 gram/15 mL solution Take 10 g by mouth 3 (three) times daily.      megestroL (MEGACE) 400 mg/10 mL (10 mL) Susp Take 10 mLs (400 mg total) by mouth 2 (two) times daily. for 7 days 140 mL 0    melatonin 5 mg Chew Take 5 mg by mouth every evening.      omeprazole (PRILOSEC) 40 MG capsule Take 1 capsule (40 mg total) by mouth once daily. 30 capsule 11    zinc gluconate 50 mg tablet Take 50 mg by mouth.      [DISCONTINUED] metoprolol tartrate (LOPRESSOR) 25 MG tablet Take 25 mg by mouth Daily.      [DISCONTINUED] LACTOBACILLUS ACIDOPHILUS ORAL Take by mouth.      [DISCONTINUED] melatonin (MELATIN) 3 mg tablet Take 2 tablets (6 mg total) by mouth nightly as needed for Insomnia. 30 tablet 0     Current Facility-Administered Medications on File Prior to Visit   Medication Dose Route Frequency Provider Last Rate Last Admin    [DISCONTINUED] GENERIC EXTERNAL MEDICATION     Provider, Generic External Data        [DISCONTINUED] GENERIC EXTERNAL MEDICATION     Provider, Generic External Data         Social History     Socioeconomic History    Marital status:    Tobacco Use    Smoking status: Never    Smokeless tobacco: Never   Substance and Sexual Activity    Alcohol use: Never    Drug use: Never    Sexual activity: Not Currently     Partners: Male     Birth control/protection: None     Social Determinants of Health     Financial Resource Strain: Low Risk  (6/14/2023)    Overall Financial Resource Strain (CARDIA)     Difficulty of Paying Living Expenses: Not very hard   Food Insecurity: No Food Insecurity (6/14/2023)    Hunger Vital Sign     Worried About Running Out of Food in the Last Year: Never true     Ran Out of Food in the Last Year: Never true   Transportation Needs: No Transportation Needs (6/14/2023)    PRAPARE - Transportation  "    Lack of Transportation (Medical): No     Lack of Transportation (Non-Medical): No   Physical Activity: Inactive (6/14/2023)    Exercise Vital Sign     Days of Exercise per Week: 0 days     Minutes of Exercise per Session: 0 min   Stress: No Stress Concern Present (6/14/2023)    [employer] - Occupational Stress Questionnaire     Feeling of Stress : Not at all   Social Connections: Socially Isolated (6/14/2023)    Social Connection and Isolation Panel [NHANES]     Frequency of Communication with Friends and Family: Never     Frequency of Social Gatherings with Friends and Family: Never     Attends Latter day Services: Never     Active Member of Clubs or Organizations: No     Attends Club or Organization Meetings: Never     Marital Status:    Housing Stability: Low Risk  (6/14/2023)    Housing Stability Vital Sign     Unable to Pay for Housing in the Last Year: No     Number of Places Lived in the Last Year: 1     Unstable Housing in the Last Year: No     Family History   Problem Relation Age of Onset    Cancer Mother     Alzheimer's disease Father        Review of Systems  A comprehensive review of systems was performed and was negative with exception of what is documented above.     Objective:   /82 (BP Location: Right arm, Patient Position: Sitting, BP Method: Medium (Manual))   Pulse (!) 42   Temp 97.5 °F (36.4 °C) (Temporal)   Resp 16   Ht 5' 2" (1.575 m)   Wt 64.4 kg (142 lb)   SpO2 96%   BMI 25.97 kg/m²   Physical Exam  General : Alert;  No acute distress, afebrile.  Elderly female  Eye : PERRLA. EOMI. Normal conjunctiva, Sclerae are nonicteric. No conjunctival injection, no pallor.  HEENT : Normocephalic/ atraumatic, Normal hearing, Oral mucosa is moist.  Respiratory : Respirations are non-labored and clear to auscultation bilaterally. Symmetrical air entry bilaterally, no crackles, no wheezes, no rhonchi. No cyanosis, no clubbing.  Cardiovascular :  Sinus " bradycardia, no edema  Gastrointestinal : Soft, nontender, non-distended, bowel sounds are present in all quadrants, no organomegaly, no guarding, no rebound.  Musculoskeletal : Normal range of motion throughout. No muscle tenderness.  Integumentary : Warm, moist, intact.  Neurologic : Alert  Psychiatric : Cooperative, Appropriate mood & affect.   Assessment/ Plan:   1. Atrial fibrillation by electrocardiogram  -     Ambulatory referral/consult to Outpatient Case Management    2. 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 Vasovagal syncope  Assessment & Plan:  Patient recent hospital admit for syncope heart rate in the office at 42 seems as though the nursing home is giving her Toprol but only if her pulse is at 60 patient is wheelchair-bound at current and has not been out the wheelchair in several weeks discussed with the daughter that I am not sure she will get better but that we can hold the Toprol and if the heart rate is higher than 90 then we can give it but I do think that the bradycardia is going to increase her risk for further syncopal episodes especially if she is able to stand and ambulate.  We discussed quality of life and potentially further advanced directives with DNR however both daughters agree that they would still like life-sustaining measures performed.  I reviewed all her medicines advised for further PT and OT as tolerated of course not sure how much more were going to rehab this patient I think this is probably as good as it gets.  Consult outpatient case management to see if we can get a sitter service maybe arranged they do have Medicaid she is currently a nursing home resident they did have this service with a sitter that Medicaid was painful when they were at home.  Right now 1 of the daughters is staying with her at the nursing home every night of the  week.               Follow up in about 6 months (around 2/21/2024).    An office visit for an established patient was performed. 10 minutes was used for reviewing the patients chart prior to the inoice visit done on that same day. 15 minutes was used during the visit in regards to taking the patient history and physical exam. There was also an additional 5 minutes spent on education and counseling regarding medical conditions, current medications including risk/benefit and side effects/adverse events, vaccine counseling.  5 minutes were also used to complete the nursing home forms and update them on changes to her regimen which includes discontinuation b.i.d. CBG checks, discontinuation Toprol with new parameters, discussion on advanced directives.  After leaving the exam room, the provider then spent an additional 5 minutes completing the electronic health record.    The patient is receptive, expresses understanding and is agreeable to plan. All questions answered; total time spent was 40 minutes.

## 2023-08-22 NOTE — TELEPHONE ENCOUNTER
----- Message from Steph Schuler sent at 8/22/2023 10:05 AM CDT -----  Regarding: orders to [facility]  Type:  Needs Medical Advice    Who Called: diana @ [facility] elmo denis    Would the patient rather a call back or a response via [facility]? C/b  Best Call Back Number: [phone]   fax# [phone]    Additional Information: please refax orders, the order sent yesterday was illegible

## 2023-08-24 NOTE — PROGRESS NOTES
8-24-23 vYonne, daughter of Ally returned CM call and said Ms. Canales is in a Nursing Home full time. CM closed case.

## 2023-09-14 NOTE — TELEPHONE ENCOUNTER
----- Message from April Jordan sent at 9/14/2023  1:33 PM CDT -----  Regarding: med advice  .Type:  Needs Medical Advice    Who Called:  patient's daughter  Symptoms (please be specific):    How long has patient had these symptoms:    Pharmacy name and phone #:    Would the patient rather a call back or a response via [facility]? Call back  Best Call Back Number:  [phone]  Additional Information: patient has been in bed with diarrhea and generalized weakness. The daughter would rather the appointment for 9/20 be virtual. She would like a call back since next available virtual is 12/1. Please advise.

## 2023-09-20 PROBLEM — G54.7 PHANTOM LIMB: Status: ACTIVE | Noted: 2023-01-01

## 2023-09-20 NOTE — TELEPHONE ENCOUNTER
Spoke to pt's daughter and she stated that she meant to mention this in the office visit, but did not. She would like to know if you could write a letter to the nursing home stating that pt can not have a neighbor with an adjoining restroom that is of the opposite sex and able to ambulate. The nursing home was trying to have the room next door with a adjoining bathroom facilitate a male patient who is able to walk, while Mrs. Canales is bed bound. This made both the daughter and family of the pt concerned of pt's health and safety. Please advise if we can do/send anything.

## 2023-09-20 NOTE — TELEPHONE ENCOUNTER
----- Message from Catherine Calvin MA sent at 9/20/2023  1:52 PM CDT -----  Regarding: FW: amarilis    ----- Message -----  From: Steph Schuler  Sent: 9/20/2023   1:35 PM CDT  To: [facility] Staff  Subject: amarilis                                            Type:  Needs Medical Advice    Who Called: pt  Would the patient rather a call back or a response via [facility]? C/b  Best Call Back Number: [phone]    Additional Information: pt's daughter called wants amarilis to call her back.  Was very short and would not give any other information

## 2023-09-20 NOTE — PROGRESS NOTES
This is a telemedicine note. Patient was treated using telemedicine, real time audio and video, according to [facility] protocols. I, distant provider, conducted the visit from location identified below. The patient participated in the visit at a non-[facility] location selected by the patient (or patients representative), identified below. I am licensed in the state where the patient stated they are located. The patient (or patients representative) stated that they understood and accepted the privacy and security risks to their information at their location.   Patient was located at Home.     I, distant provider, was located at [facility].   Subjective:      Patient ID: Ally Valdez is a 88 y.o. female.    Chief Complaint: Follow-up (Hospital F/U)      HPI:  88-year-old female on telemedicine visit she was discharged from [facility] after urinary tract infection; since then she is had a normal urinalysis  Has pain in her feet, both feet hurt her. She is taking tylenol and having pain at night. She was discharged from the hospital for a UTI; on Cipro and Zyvox.   Her daughter reports that she is not sleeping at night because of the pain  Case discussed with Adis Encinas who is the nurse practitioner taking care of her at the nursing home      Past Medical History:  Past Medical History:   Diagnosis Date    Foot ulcer, left     Hypertension     Paroxysmal atrial fibrillation     PVD (peripheral vascular disease)      Past Surgical History:   Procedure Laterality Date     SECTION  1973    Stent placement right thigh  2021    TOE AMPUTATION Right 2023     Review of patient's allergies indicates:   Allergen Reactions    Lisinopril Swelling    Fluconazole Other (See Comments)     Drug interaction w/Eliquis  Bleeding      Current Outpatient Medications on File Prior to Visit   Medication Sig Dispense Refill    ascorbic acid, vitamin C, (VITAMIN C) 1000 MG tablet Take 2  tablets by mouth every morning.      aspirin (ECOTRIN) 81 MG EC tablet Take 81 mg by mouth 3 (three) times a week.      cholecalciferol, vitamin D3, 125 mcg (5,000 unit) Tab Take 5,000 Units by mouth once daily.      cholestyramine-aspartame (QUESTRAN/PREVALITE LIGHT) 4 gram Powd       diclofenac sodium (VOLTAREN) 1 % Gel Apply topically.      ELIQUIS 2.5 mg Tab Take 1 tablet (2.5 mg total) by mouth 2 (two) times daily. 180 tablet 3    fluticasone propionate (FLONASE) 50 mcg/actuation nasal spray 1 spray by Nasal route once daily.      furosemide (LASIX) 20 MG tablet Take 20 mg by mouth as needed.      Lactobacillus acidoph-pectin 75 million cell -100 mg Cap Take 2 capsules by mouth Daily.      lactulose (CHRONULAC) 10 gram/15 mL solution Take 10 g by mouth 3 (three) times daily.      metoprolol tartrate (LOPRESSOR) 25 MG tablet Take 25 mg by mouth as needed.      omeprazole (PRILOSEC) 40 MG capsule Take 1 capsule (40 mg total) by mouth once daily. 30 capsule 11    triamcinolone acetonide 0.1% (KENALOG) 0.1 % cream SMARTSI Application Topical 2-3 Times Daily      zinc gluconate 50 mg tablet Take 50 mg by mouth.      [DISCONTINUED] melatonin 5 mg Chew Take 5 mg by mouth every evening.      megestroL (MEGACE) 400 mg/10 mL (10 mL) Susp Take 10 mLs (400 mg total) by mouth 2 (two) times daily. for 7 days 140 mL 0    [DISCONTINUED] cephALEXin (KEFLEX) 500 MG capsule Take 1 capsule (500 mg total) by mouth every 12 (twelve) hours. 60 capsule 3     Current Facility-Administered Medications on File Prior to Visit   Medication Dose Route Frequency Provider Last Rate Last Admin    [DISCONTINUED] GENERIC EXTERNAL MEDICATION     Provider, Generic External Data        [DISCONTINUED] GENERIC EXTERNAL MEDICATION     Provider, Generic External Data        [DISCONTINUED] GENERIC EXTERNAL MEDICATION     Provider, Generic External Data         Social History     Socioeconomic History    Marital status:    Tobacco Use     Smoking status: Never    Smokeless tobacco: Never   Substance and Sexual Activity    Alcohol use: Never    Drug use: Never    Sexual activity: Not Currently     Partners: Male     Birth control/protection: None     Social Determinants of Health     Financial Resource Strain: Low Risk  (6/14/2023)    Overall Financial Resource Strain (CARDIA)     Difficulty of Paying Living Expenses: Not very hard   Food Insecurity: No Food Insecurity (6/14/2023)    Hunger Vital Sign     Worried About Running Out of Food in the Last Year: Never true     Ran Out of Food in the Last Year: Never true   Transportation Needs: No Transportation Needs (6/14/2023)    PRAPARE - Transportation     Lack of Transportation (Medical): No     Lack of Transportation (Non-Medical): No   Physical Activity: Inactive (6/14/2023)    Exercise Vital Sign     Days of Exercise per Week: 0 days     Minutes of Exercise per Session: 0 min   Stress: No Stress Concern Present (6/14/2023)    [employer] - Occupational Stress Questionnaire     Feeling of Stress : Not at all   Social Connections: Socially Isolated (6/14/2023)    Social Connection and Isolation Panel [NHANES]     Frequency of Communication with Friends and Family: Never     Frequency of Social Gatherings with Friends and Family: Never     Attends Taoism Services: Never     Active Member of Clubs or Organizations: No     Attends Club or Organization Meetings: Never     Marital Status:    Housing Stability: Low Risk  (6/14/2023)    Housing Stability Vital Sign     Unable to Pay for Housing in the Last Year: No     Number of Places Lived in the Last Year: 1     Unstable Housing in the Last Year: No     Family History   Problem Relation Age of Onset    Cancer Mother     Alzheimer's disease Father        Review of Systems   Constitutional:  Positive for activity change and unexpected weight change.   HENT:  Positive for trouble swallowing. Negative for hearing loss  and rhinorrhea.    Eyes:  Negative for discharge and visual disturbance.   Respiratory:  Negative for chest tightness and wheezing.    Cardiovascular:  Negative for chest pain and palpitations.   Gastrointestinal:  Positive for diarrhea. Negative for blood in stool, constipation and vomiting.   Endocrine: Negative for polydipsia and polyuria.   Genitourinary:  Negative for difficulty urinating, dysuria, hematuria and menstrual problem.   Musculoskeletal:  Negative for arthralgias, joint swelling and neck pain.   Neurological:  Negative for weakness and headaches.   Psychiatric/Behavioral:  Negative for confusion and dysphoric mood.      A comprehensive review of systems was performed and was negative with exception of what is documented above.     Objective:   There were no vitals taken for this visit.  Physical Exam  Constitutional:       Appearance: Normal appearance.   Pulmonary:      Effort: Pulmonary effort is normal.   Neurological:      Mental Status: She is alert.   Psychiatric:      Comments: demented         Assessment/ Plan:   1. Dementia, unspecified dementia severity, unspecified dementia type, unspecified whether behavioral, psychotic, or mood disturbance or anxiety  -     Ambulatory referral/consult to Outpatient Case Management    2. Phantom limb    Other orders  -     DULoxetine (CYMBALTA) 20 MG capsule; Take 1 capsule (20 mg total) by mouth once daily.  Dispense: 30 capsule; Refill: 11     Discontinue melatonin, trial of Cymbalta 20 if she does not tolerate this well then will probably do Lexapro 5.  Daughter to call with any side effects or inability to tolerate.        Follow up if symptoms worsen or fail to improve.      Face to face time spent with patient exceeds 12 minutes, over 50% of which was used for education and counseling regarding medical conditions, current medications including risk/benefit and side effects/adverse events, over the counter medications-uses/doses, home self-care and  contact precautions, and red flags and indications for immediate medical attention. The patient is receptive, expresses understanding and is agreeable to plan. All questions answered

## 2023-09-21 NOTE — TELEPHONE ENCOUNTER
Spoke to pt's daughter and let her know that this needs to be brought to the  per Dr. Murray's recommendation.   Pt's daughter stated that she already has spoken to the  and they are going to have a meeting with the DON and the  of the nursing home.

## 2023-09-21 NOTE — TELEPHONE ENCOUNTER
"Per Dr. Murray, "think this would be inappropriate for any female   Cant believe we need to write a letter for this   Does mrs aguilar use the bathroom at all? If so no she can not share a bathroom"    "

## 2023-10-04 NOTE — TELEPHONE ENCOUNTER
"Spoke to pt's daughter and she stated that pt is still having pain at night and the Cymbalta does not seem to be helping at all. Daughter is having to give pt Tylenol through the night. Daughter stated that she only has the pain at night, but not during the day. Daughter would like to know if they could increase the medication. Pt is not having any side effects from the medication, the daughter said that she just feels like it is not working enough.    LOV: 9/20/23 and stated, "Discontinue melatonin, trial of Cymbalta 20 if she does not tolerate this well then will probably do Lexapro 5.  Daughter to call with any side effects or inability to tolerate."    If any changes are made they need orders faxed to [facility] [place] at Fax: [phone].  "

## 2023-10-04 NOTE — TELEPHONE ENCOUNTER
"Per JUSTIN Ly, "Is this for pain at the amputation site?? What has been tried other than Cymbalta? 20mg is a low dose and we may need to increase for better efficacy. Has she tried gabapentin or any topical therapies like lidocaine?"  "

## 2023-10-04 NOTE — TELEPHONE ENCOUNTER
----- Message from Riley Cassidy sent at 10/4/2023 10:00 AM CDT -----  .Type:  Patient Returning Call    Who Called:pt daughter   Who Left Message for Patient:Pascual   Does the patient know what this is regarding?:new medication    Would the patient rather a call back or a response via [facility]? Call back   Best Call Back Number:[phone]  Additional Information:

## 2023-10-04 NOTE — TELEPHONE ENCOUNTER
"Spoke to pt's daughter and she stated that the pain has been only treated with Norco from after the surgery, and that she did recall when pt was in the hospital prior to amputation that they used lidocaine patches that seemed to help. Per JUSTIN Ly, "Okay to send Cymbalta 20 mg BID and Lidocaine patch when pain is clearly bothering the pt."Spoke to pt's daughter and she verbally confirmed understanding, and stated that she will update us on how the change helps the pt.   "

## 2023-10-06 NOTE — TELEPHONE ENCOUNTER
----- Message from Jannette Westfall sent at 10/6/2023 10:04 AM CDT -----  [facility] called asking if we can fax them the Rx for the Cymbalta to [phone]    I asked Malachi about this and she stated she signed the Rx and thought you faxed it, but I don't see anything in the Media.    Could you please fax the order to [facility] in [place]

## 2023-11-01 NOTE — TELEPHONE ENCOUNTER
----- Message from Annmarie Batista sent at 11/1/2023  4:26 PM CDT -----  Regarding: Patient Call  .Type:  Patient Returning Call    Who Called:pts' daughter-Yvonne  Who Left Message for Patient:Pascual  Does the patient know what this is regarding?:her mother   Would the patient rather a call back or a response via [facility]?   Best Call Back Number:[phone]  Additional Information: pt's daughter is asking to speak with you

## 2023-11-01 NOTE — TELEPHONE ENCOUNTER
Spoke to pt's daughter. She stated that for about 1 week pt has had cough and congestion, and today pt was unresponsive and not eating. Daughter had ambulance  pt, and now they are on their way to [facility]. They tried [facility], but told they are on divert.

## 2023-11-08 NOTE — FIRST PROVIDER EVALUATION
"Medical screening examination initiated.  I have conducted a focused provider triage encounter, findings are as follows:    Brief history of present illness:  88-year-old female sent from nursing home for evaluation of decreased appetite.  Nursing home reports patient was not eaten in a week.  Family requesting a 2nd opinion as stating nursing home would like to either put her on hospice or give PEG tube.    Vitals:    11/08/23 1416   BP: 115/70   Pulse: 77   Resp: 17   Temp: 98.2 °F (36.8 °C)   TempSrc: Tympanic   SpO2: 99%   Weight: 63.5 kg (140 lb)   Height: 5' 2" (1.575 m)       Pertinent physical exam:  Patient awake lying on stretcher.    Brief workup plan:  Labs UA    Preliminary workup initiated; this workup will be continued and followed by the physician or advanced practice provider that is assigned to the patient when roomed.  "

## 2023-11-08 NOTE — TELEPHONE ENCOUNTER
----- Message from Clair Childers sent at 11/8/2023  2:07 PM CST -----  Regarding: medical advice  Type:  Needs Medical Advice    Who Called: Ally    Would the patient rather a call back or a response via [facility]? Call back   Best Call Back Number: [phone]  Additional Information: Would like Pascual to call her back.  Her mom is at the ER and she would like call back

## 2023-11-08 NOTE — ED PROVIDER NOTES
Encounter Date: 11/8/2023       History     Chief Complaint   Patient presents with    Medical Problem     Presents via EMS from \[facility]\"" for decreased appetite and hasn't eaten in a week. Family refused Hospice and a PEG tube. GCS 11, at baseline.      The patient is a 88 y.o. female with a history of hypertension, atrial fibrillation, and dementia who presents to the Emergency Department with a chief complaint of decreased intake. Daughter at bedside states that patient has not been eating or drinking much for 1 week. She states that patient was admitted to [facility] from 11/01/23 until 11/04/23 with a urinary tract infection and poor PO intake. Daughter reports that she was offered PEG tube as well as hospice but she wants a second opinion. Symptoms began 1 week ago and have been constant since onset. Her pain is currently rated as a 0/10 in severity. Associated symptoms include nothing. Symptoms are aggravated with nothing and there are no alleviating factors. The patient denies fever or chills. She reports taking nothing prior to arrival with no relief of symptoms. No other reported symptoms at this time.      The history is provided by a caregiver and a relative. No  was used.   Illness   The current episode started several days ago. The problem occurs occasionally. The problem has been unchanged. The pain is at a severity of 0/10. Nothing relieves the symptoms. Nothing aggravates the symptoms. Associated symptoms include cough. Pertinent negatives include no fever, no abdominal pain, no nausea, no vomiting, no sore throat, no shortness of breath and no rash. Services received include tests performed and medications given. Recently, medical care has been given at another facility.     Review of patient's allergies indicates:   Allergen Reactions    Lisinopril Swelling    Fluconazole Other (See Comments)     Drug interaction w/Eliquis  Bleeding      Past Medical  History:   Diagnosis Date    Foot ulcer, left     Hypertension     Paroxysmal atrial fibrillation     PVD (peripheral vascular disease)      Past Surgical History:   Procedure Laterality Date     SECTION  1973    Stent placement right thigh  2021    TOE AMPUTATION Right 2023     Family History   Problem Relation Age of Onset    Cancer Mother     Alzheimer's disease Father      Social History     Tobacco Use    Smoking status: Never    Smokeless tobacco: Never   Substance Use Topics    Alcohol use: Never    Drug use: Never     Review of Systems   Constitutional:  Negative for fever.   HENT:  Negative for sore throat.    Respiratory:  Positive for cough. Negative for shortness of breath.    Cardiovascular:  Negative for chest pain.   Gastrointestinal:  Negative for abdominal pain, nausea and vomiting.        Poor intake   Genitourinary:  Negative for dysuria, frequency and urgency.   Musculoskeletal:  Negative for back pain.   Skin:  Negative for rash.   Neurological:  Negative for weakness.   Hematological:  Does not bruise/bleed easily.   All other systems reviewed and are negative.      Physical Exam     Initial Vitals [23 1416]   BP Pulse Resp Temp SpO2   115/70 77 17 98.2 °F (36.8 °C) 99 %      MAP       --         Physical Exam    Nursing note and vitals reviewed.  Constitutional:   Appears chronically ill    HENT:   Head: Normocephalic.   Right Ear: Hearing and tympanic membrane normal.   Left Ear: Hearing and tympanic membrane normal.   Mouth/Throat: Uvula is midline, oropharynx is clear and moist and mucous membranes are normal.   Cardiovascular:  Normal rate, regular rhythm, normal heart sounds and normal pulses.           Pulmonary/Chest: Effort normal. She has rhonchi.   Abdominal: Abdomen is soft. Bowel sounds are normal. There is no abdominal tenderness.     Lymphadenopathy:     She has no cervical adenopathy.   Neurological: She is alert. GCS eye subscore is 4. GCS verbal  subscore is 4. GCS motor subscore is 5.   Skin: Skin is warm and dry. Capillary refill takes less than 2 seconds.         ED Course   Procedures  Labs Reviewed   COMPREHENSIVE METABOLIC PANEL - Abnormal; Notable for the following components:       Result Value    Chloride 113 (*)     Glucose Level 118 (*)     Albumin Level 2.8 (*)     Albumin/Globulin Ratio 0.8 (*)     All other components within normal limits   URINALYSIS, REFLEX TO URINE CULTURE - Abnormal; Notable for the following components:    Protein, UA Trace (*)     Urobilinogen, UA 2.0 (*)     Mucous, UA Trace (*)     All other components within normal limits   CBC WITH DIFFERENTIAL - Abnormal; Notable for the following components:    WBC 12.82 (*)     IG# 0.10 (*)     All other components within normal limits   PROTIME-INR - Abnormal; Notable for the following components:    PT 18.1 (*)     INR 1.5 (*)     All other components within normal limits   LACTIC ACID, PLASMA - Abnormal; Notable for the following components:    Lactic Acid Level 2.8 (*)     All other components within normal limits   BLOOD CULTURE OLG   BLOOD CULTURE [facility]   CBC W/ AUTO DIFFERENTIAL    Narrative:     The following orders were created for panel order CBC auto differential.  Procedure                               Abnormality         Status                     ---------                               -----------         ------                     CBC with Differential[[id number]]       Abnormal            Final result                 Please view results for these tests on the individual orders.   LACTIC ACID, PLASMA   COVID/FLU A&B PCR   RESPIRATORY PANEL          Imaging Results              X-Ray Chest 1 View (Final result)  Result time 11/08/23 18:56:56      Final result by Davion Simmons MD (11/08/23 18:56:56)                   Narrative:    EXAMINATION  XR CHEST 1 VIEW    CLINICAL HISTORY  cough;    TECHNIQUE  A total of 1 frontal image(s) submitted of the  chest.    COMPARISON  22 July 2023    FINDINGS  Lines/tubes/devices: ECG leads overlie the imaged region.  Prior right upper extremity PICC is no longer visualized.    The cardiac silhouette and central vascular structures are unchanged.  The trachea is midline. No new or worsening consolidation is developed in the interval.  There is no large pleural effusion or convincing pneumothorax.    Regional osseous structures and extrathoracic soft tissues are similar.    IMPRESSION  1. No acute process or other adverse interval change.  2. Interval removal of right upper extremity PICC.      Electronically signed by: Davion Simmons  Date:    11/08/2023  Time:    18:56                                     Medications   0.9%  NaCl infusion (has no administration in time range)   sodium chloride 0.9% nebulizer solution 3 mL (has no administration in time range)     Medical Decision Making  The patient is a 88 y.o. female with a history of hypertension, atrial fibrillation, and dementia who presents to the Emergency Department with a chief complaint of decreased intake. Daughter at bedside states that patient has not been eating or drinking much for 1 week. She states that patient was admitted to [facility] from 11/01/23 until 11/04/23 with a urinary tract infection and poor PO intake. Daughter reports that she was offered PEG tube as well as hospice but she wants a second opinion. Symptoms began 1 week ago and have been constant since onset. Her pain is currently rated as a 0/10 in severity. Associated symptoms include nothing. Symptoms are aggravated with nothing and there are no alleviating factors. The patient denies fever or chills. She reports taking nothing prior to arrival with no relief of symptoms. No other reported symptoms at this time.      Differential diagnosis include but are not limited to electrolyte derangement, dehydration, urinary tract infection, pneumonia, advanced dementia    Problems  Addressed:  Generalized weakness: acute illness or injury  Poor appetite: acute illness or injury    Amount and/or Complexity of Data Reviewed  Labs: ordered.  Radiology: ordered.  Discussion of management or test interpretation with external provider(s): Discussed with ED attending, Makenna, she had face to face encounter with patient.     Discussed with Dr. Warner who is on call for Dr. Murray, accepts admission.     Risk  Prescription drug management.  Decision regarding hospitalization.               ED Course as of 11/08/23 1902 Wed Nov 08, 2023 1815 Family states that patient has very poor p.o. intake.  She does state that they spoke with hospice.  States that she just wanted a 2nd opinion before moving for with a PEG tube or hospice.  Her lactic is elevated. Will hydrate. I have discussed results in detail with family.  Will discuss with the patient's primary care provider for admission for further evaluation. [LM]   1818 Discussed with Dr. Warner who is on call for Dr. Murray. Accepts admission  [LM]      ED Course User Index  [LM] Ravinder Crooks NP                    Clinical Impression:   Final diagnoses:  [R53.1] Generalized weakness (Primary)  [R63.0] Poor appetite        ED Disposition Condition    Admit Stable                Ravinder Crooks, NP  11/08/23 1902

## 2023-11-08 NOTE — Clinical Note
Diagnosis: Generalized weakness [448465]  Admitting Provider:: LILIANA LOJA [[id number]]  Admit to which facility:: [facility] [[id number]]  Reason for IP Medical Treatment  (Clinical interventions that can only be ac complished in the IP setting? ) :: generalized weakness, ivfs, possible peg placement  I certify that Inpatient services for greater than or equal to 2 midnights are medically necessary:: Yes  Plans for Post-Acute care--if anticipated (pick the singl e best option):: D. Skilled Nursing Placement

## 2023-11-09 PROBLEM — R53.81 PHYSICAL DECONDITIONING: Status: RESOLVED | Noted: 2022-08-02 | Resolved: 2023-01-01

## 2023-11-09 PROBLEM — L08.9 LEFT FOOT INFECTION: Status: RESOLVED | Noted: 2023-01-01 | Resolved: 2023-01-01

## 2023-11-09 PROBLEM — R55 SYNCOPE: Chronic | Status: RESOLVED | Noted: 2023-01-01 | Resolved: 2023-01-01

## 2023-11-09 PROBLEM — R53.1 GENERALIZED WEAKNESS: Status: ACTIVE | Noted: 2023-01-01

## 2023-11-09 NOTE — TELEPHONE ENCOUNTER
----- Message from Clair Childers sent at 11/9/2023 10:58 AM CST -----  Regarding: return call  Type:  Patient Returning Call    Who Called:Yvonne  Who Left Message for Patient:Catherine  Does the patient know what this is regarding?:  Would the patient rather a call back or a response via [facility]? Call back   Best Call Back Number:[phone]  Additional Information: Yvonne was returning a call back to catherine

## 2023-11-09 NOTE — SUBJECTIVE & OBJECTIVE
Past Medical History:   Diagnosis Date    Foot ulcer, left     Hypertension     Paroxysmal atrial fibrillation     PVD (peripheral vascular disease)        Past Surgical History:   Procedure Laterality Date     SECTION  1973    Stent placement right thigh  2021    TOE AMPUTATION Right 2023       Review of patient's allergies indicates:   Allergen Reactions    Lisinopril Swelling    Fluconazole Other (See Comments)     Drug interaction w/Eliquis  Bleeding        Current Facility-Administered Medications on File Prior to Encounter   Medication    [DISCONTINUED] GENERIC EXTERNAL MEDICATION    [DISCONTINUED] GENERIC EXTERNAL MEDICATION     Current Outpatient Medications on File Prior to Encounter   Medication Sig    aspirin (ECOTRIN) 81 MG EC tablet Take 81 mg by mouth 3 (three) times a week.    ELIQUIS 2.5 mg Tab Take 1 tablet (2.5 mg total) by mouth 2 (two) times daily.    fluticasone propionate (FLONASE) 50 mcg/actuation nasal spray 1 spray by Nasal route once daily.    furosemide (LASIX) 20 MG tablet Take 20 mg by mouth as needed.    metoprolol tartrate (LOPRESSOR) 25 MG tablet Take 25 mg by mouth as needed.    [DISCONTINUED] ascorbic acid, vitamin C, (VITAMIN C) 1000 MG tablet Take 2 tablets by mouth every morning.    [DISCONTINUED] cholecalciferol, vitamin D3, 125 mcg (5,000 unit) Tab Take 5,000 Units by mouth once daily.    [DISCONTINUED] cholestyramine-aspartame (QUESTRAN/PREVALITE LIGHT) 4 gram Powd     [DISCONTINUED] diclofenac sodium (VOLTAREN) 1 % Gel Apply topically.    [DISCONTINUED] DULoxetine (CYMBALTA) 20 MG capsule Take 1 capsule (20 mg total) by mouth 2 (two) times daily.    [DISCONTINUED] Lactobacillus acidoph-pectin 75 million cell -100 mg Cap Take 2 capsules by mouth Daily.    [DISCONTINUED] lactulose (CHRONULAC) 10 gram/15 mL solution Take 10 g by mouth 3 (three) times daily.    [DISCONTINUED] megestroL (MEGACE) 400 mg/10 mL (10 mL) Susp Take 10 mLs (400 mg total) by mouth 2  (two) times daily. for 7 days    [DISCONTINUED] omeprazole (PRILOSEC) 40 MG capsule Take 1 capsule (40 mg total) by mouth once daily.    [DISCONTINUED] triamcinolone acetonide 0.1% (KENALOG) 0.1 % cream SMARTSI Application Topical 2-3 Times Daily    [DISCONTINUED] zinc gluconate 50 mg tablet Take 50 mg by mouth.     Family History       Problem Relation (Age of Onset)    Alzheimer's disease Father    Cancer Mother          Tobacco Use    Smoking status: Never    Smokeless tobacco: Never   Substance and Sexual Activity    Alcohol use: Never    Drug use: Never    Sexual activity: Not Currently     Partners: Male     Birth control/protection: None     Review of Systems   Respiratory:  Apnea: clinical condition.      Objective:     Vital Signs (Most Recent):  Temp: 97.5 °F (36.4 °C) (23)  Pulse: 90 (23)  Resp: (!) 21 (23)  BP: 138/77 (23)  SpO2: 99 % (23) Vital Signs (24h Range):  Temp:  [97.5 °F (36.4 °C)-98.2 °F (36.8 °C)] 97.5 °F (36.4 °C)  Pulse:  [76-95] 90  Resp:  [17-23] 21  SpO2:  [96 %-100 %] 99 %  BP: (102-145)/(70-99) 138/77     Weight: 63.5 kg (140 lb)  Body mass index is 25.61 kg/m².     Physical Exam  HENT:      Head: Normocephalic and atraumatic.   Eyes:      Extraocular Movements: Extraocular movements intact.      Pupils: Pupils are equal, round, and reactive to light.   Cardiovascular:      Rate and Rhythm: Regular rhythm. Tachycardia present.      Pulses: Normal pulses.      Heart sounds: Murmur heard.   Pulmonary:      Effort: Pulmonary effort is normal. No respiratory distress.      Breath sounds: No stridor. No wheezing, rhonchi or rales.      Comments: Tracheal / wet sounds  Abdominal:      General: Abdomen is flat. Bowel sounds are normal. There is no distension.      Tenderness: There is no abdominal tenderness. There is no guarding.               Significant Labs: All pertinent labs within the past 24 hours have been  reviewed.    Significant Imaging: I have reviewed all pertinent imaging results/findings within the past 24 hours.

## 2023-11-09 NOTE — H&P
[facility]  Emergency Dept  [facility] Medicine  History & Physical    Patient Name: Ally Valdez  MRN: [id number]  Patient Class: IP- Inpatient  Admission Date: 11/8/2023  Attending Physician: Dong Vogt,*   Primary Care Provider: Dong Vogt MD         Patient information was obtained from relative(s) and ER records.     Subjective:     Principal Problem:Late onset Alzheimer's disease without behavioral disturbance    Chief Complaint:   Chief Complaint   Patient presents with    Medical Problem     Presents via EMS from [facility] for decreased appetite and hasn't eaten in a week. Family refused Hospice and a PEG tube. GCS 11, at baseline.         HPI: 88-year-old  female recently admitted and discharged from our [facility] on 11/04/2023 she lives at the nursing home is under the care of Adis Encinas.  Consult was placed to hospice services on 11/08/2023 she met with fanta Forde in a but was having difficulty deciding if she wanted to resume palliative care move forward with hospice.  On the morning following admission pain is laying in bed she sounds like she has a lot of mucus in her throat, lung sounds are clear she is minimally responsive she does open her eyes to the side of my voice but does not answer any questions.  I have reviewed her recent hospital admit records she was recently treated for UTI that urinalysis on presentation here is clean with no evidence of infection, COVID flu negative, chest x-ray without any acute cardiopulmonary process or evidence of pneumonia or volume overload.  Laboratory parameters appear to be stable her lactic acid was elevated at 4.7.  Her daughter states that her oral intake has been poor she can only tolerate pureed foods and of course at this stage of her life we do not want to deprive her of any pleasure feeds.  We discussed the utility of a PEG tube and how it does not improve  quality of life nor prolonged it and I did not think she will be a good surgical candidate for her to have this procedure in the daughter who is at the bedside who is Yvonne agrees that she does not want her mother to have a PEG.  She is amenable to hospice services, case management has been consulted I have placed a scopolamine patch on her given her 40 mg of Lasix and hopefully can help dry up some of her oral secretions I do think it is totally fine for her to be able to eat whatever she desires and that comfort care is where we are.  This was disclosed with the daughter and she is amenable to the plan.      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, on Keflex chronically follows up with Infectious Disease at the end of the month  Currently living at [facility] as a resident        Past Medical History:   Diagnosis Date    Foot ulcer, left     Hypertension     Paroxysmal atrial fibrillation     PVD (peripheral vascular disease)        Past Surgical History:   Procedure Laterality Date     SECTION  1973    Stent placement right thigh  2021    TOE AMPUTATION Right 2023       Review of patient's allergies indicates:   Allergen Reactions    Lisinopril Swelling    Fluconazole Other (See Comments)     Drug interaction w/Eliquis  Bleeding        Current Facility-Administered Medications on File Prior to Encounter   Medication    [DISCONTINUED] GENERIC EXTERNAL MEDICATION    [DISCONTINUED] GENERIC EXTERNAL MEDICATION     Current Outpatient Medications on File Prior to Encounter   Medication Sig    aspirin (ECOTRIN) 81 MG EC tablet Take 81 mg by mouth 3 (three) times a week.    ELIQUIS 2.5 mg Tab Take 1 tablet (2.5 mg total) by mouth 2 (two) times daily.    fluticasone propionate (FLONASE) 50 mcg/actuation nasal spray 1 spray by Nasal route once daily.    furosemide (LASIX) 20 MG tablet Take 20 mg by mouth as needed.     metoprolol tartrate (LOPRESSOR) 25 MG tablet Take 25 mg by mouth as needed.    [DISCONTINUED] ascorbic acid, vitamin C, (VITAMIN C) 1000 MG tablet Take 2 tablets by mouth every morning.    [DISCONTINUED] cholecalciferol, vitamin D3, 125 mcg (5,000 unit) Tab Take 5,000 Units by mouth once daily.    [DISCONTINUED] cholestyramine-aspartame (QUESTRAN/PREVALITE LIGHT) 4 gram Powd     [DISCONTINUED] diclofenac sodium (VOLTAREN) 1 % Gel Apply topically.    [DISCONTINUED] DULoxetine (CYMBALTA) 20 MG capsule Take 1 capsule (20 mg total) by mouth 2 (two) times daily.    [DISCONTINUED] Lactobacillus acidoph-pectin 75 million cell -100 mg Cap Take 2 capsules by mouth Daily.    [DISCONTINUED] lactulose (CHRONULAC) 10 gram/15 mL solution Take 10 g by mouth 3 (three) times daily.    [DISCONTINUED] megestroL (MEGACE) 400 mg/10 mL (10 mL) Susp Take 10 mLs (400 mg total) by mouth 2 (two) times daily. for 7 days    [DISCONTINUED] omeprazole (PRILOSEC) 40 MG capsule Take 1 capsule (40 mg total) by mouth once daily.    [DISCONTINUED] triamcinolone acetonide 0.1% (KENALOG) 0.1 % cream SMARTSI Application Topical 2-3 Times Daily    [DISCONTINUED] zinc gluconate 50 mg tablet Take 50 mg by mouth.     Family History       Problem Relation (Age of Onset)    Alzheimer's disease Father    Cancer Mother          Tobacco Use    Smoking status: Never    Smokeless tobacco: Never   Substance and Sexual Activity    Alcohol use: Never    Drug use: Never    Sexual activity: Not Currently     Partners: Male     Birth control/protection: None     Review of Systems   Respiratory:  Apnea: clinical condition.      Objective:     Vital Signs (Most Recent):  Temp: 97.5 °F (36.4 °C) (23)  Pulse: 90 (23)  Resp: (!) 21 (23)  BP: 138/77 (23)  SpO2: 99 % (23) Vital Signs (24h Range):  Temp:  [97.5 °F (36.4 °C)-98.2 °F (36.8 °C)] 97.5 °F (36.4 °C)  Pulse:  [76-95] 90  Resp:  [17-23] 21  SpO2:   [96 %-100 %] 99 %  BP: (102-145)/(70-99) 138/77     Weight: 63.5 kg (140 lb)  Body mass index is 25.61 kg/m².     Physical Exam  HENT:      Head: Normocephalic and atraumatic.   Eyes:      Extraocular Movements: Extraocular movements intact.      Pupils: Pupils are equal, round, and reactive to light.   Cardiovascular:      Rate and Rhythm: Regular rhythm. Tachycardia present.      Pulses: Normal pulses.      Heart sounds: Murmur heard.   Pulmonary:      Effort: Pulmonary effort is normal. No respiratory distress.      Breath sounds: No stridor. No wheezing, rhonchi or rales.      Comments: Tracheal / wet sounds  Abdominal:      General: Abdomen is flat. Bowel sounds are normal. There is no distension.      Tenderness: There is no abdominal tenderness. There is no guarding.               Significant Labs: All pertinent labs within the past 24 hours have been reviewed.    Significant Imaging: I have reviewed all pertinent imaging results/findings within the past 24 hours.    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      No notes have been filed under this hospital service.  Service: Hospital Medicine    VTE Risk Mitigation (From admission, onward)         Ordered     apixaban tablet 2.5 mg   2 times daily         11/09/23 0838                               Dong Vogt MD  Department of Hospital Medicine  [facility] - Emergency Dept

## 2023-11-09 NOTE — PROGRESS NOTES
Jillian with [facility]) met with patient and family and reported patient and family has agreed to hospice services and patient has been accepted; therefore, [facility] services/care will be provided at \[facility]\"" for patient. Daughter has decided to sign DNR for patient.  I have updated Tawnya Mon, frida RN. Patient will discharge back to \[facility]\"". Tawnya will contact \[facility]\"" ([phone]) to give report and arrange transportation for patient back to \[facility]\"". Enid with [facility] will provide tending nurse [place] to send back to NH.

## 2023-11-09 NOTE — DISCHARGE SUMMARY
[facility] - Emergency Dept  Hospital Medicine  Discharge Summary      Patient Name: Ally Valdez  MRN: [id number]  SAVANNAH: [id number]  Patient Class: IP- Inpatient  Admission Date: 11/8/2023  Hospital Length of Stay: 1 days  Discharge Date and Time:  11/09/2023 11:43 AM  Attending Physician: Dong Vogt,*   Discharging Provider: Dong Vogt MD  Primary Care Provider: Dong Vogt MD    Primary Care Team: Networked reference to record PCT     HPI:   88-year-old  female recently admitted and discharged from our [facility] on 11/04/2023 she lives at the nursing home is under the care of Adis Encinas.  Consult was placed to hospice services on 11/08/2023 she met with fanta Forde in a but was having difficulty deciding if she wanted to resume palliative care move forward with hospice.  On the morning following admission pain is laying in bed she sounds like she has a lot of mucus in her throat, lung sounds are clear she is minimally responsive she does open her eyes to the side of my voice but does not answer any questions.  I have reviewed her recent hospital admit records she was recently treated for UTI that urinalysis on presentation here is clean with no evidence of infection, COVID flu negative, chest x-ray without any acute cardiopulmonary process or evidence of pneumonia or volume overload.  Laboratory parameters appear to be stable her lactic acid was elevated at 4.7.  Her daughter states that her oral intake has been poor she can only tolerate pureed foods and of course at this stage of her life we do not want to deprive her of any pleasure feeds.  We discussed the utility of a PEG tube and how it does not improve quality of life nor prolonged it and I did not think she will be a good surgical candidate for her to have this procedure in the daughter who is at the bedside who is Yvonne agrees that she does not want her  mother to have a PEG.  She is amenable to hospice services, case management has been consulted I have placed a scopolamine patch on her given her 40 mg of Lasix and hopefully can help dry up some of her oral secretions I do think it is totally fine for her to be able to eat whatever she desires and that comfort care is where we are.  This was disclosed with the daughter and she is amenable to the plan.      * No surgery found *      Hospital Course:   Yvonne made patient DNR;  okay to transfer back to nursing home with hospice       Goals of Care Treatment Preferences:  Code Status: Full Code      Consults:   Consults (From admission, onward)        Status Ordering Provider     Inpatient consult to Social Work/Case Management  Once        Provider:  (Not yet assigned)    Completed LILIANA LOJA          No new Assessment & Plan notes have been filed under this hospital service since the last note was generated.  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    Significant Diagnostic Studies: Labs: All labs within the past 24 hours have been reviewed    Pending Diagnostic Studies:     None         Medications:  Reconciled Home Medications:      Medication List      START taking these medications    scopolamine 1.3-1.5 mg (1 mg over 3 days)  Commonly known as:  TRANSDERM-SCOP  Place 1 patch onto the skin Every 3 (three) days.        CONTINUE taking these medications    aspirin 81 MG EC tablet  Commonly known as: ECOTRIN  Take 81 mg by mouth 3 (three) times a week.     ELIQUIS 2.5 mg Tab  Generic drug: apixaban  Take 1 tablet (2.5 mg total) by mouth 2 (two) times daily.     fluticasone propionate 50 mcg/actuation nasal spray  Commonly known as: FLONASE  1 spray by Nasal route once daily.     furosemide 20 MG tablet  Commonly known as: LASIX  Take 20 mg by mouth as needed.     metoprolol tartrate 25 MG tablet  Commonly known as: LOPRESSOR  Take 25 mg by mouth as needed.        STOP taking these medications    ascorbic acid (vitamin C) 1000 MG tablet  Commonly known as: VITAMIN C     cholecalciferol (vitamin D3) 125 mcg (5,000 unit) Tab     cholestyramine-aspartame 4 gram Powd  Commonly known as: QUESTRAN/PREVALITE LIGHT     diclofenac sodium 1 % Gel  Commonly known as: VOLTAREN     DULoxetine 20 MG capsule  Commonly known as: CYMBALTA     Lactobacillus acidoph-pectin 75 million cell -100 mg Cap     lactulose 10 gram/15 mL solution  Commonly known as: CHRONULAC     megestroL 400 mg/10 mL (10 mL) Susp  Commonly known as: MEGACE     omeprazole 40 MG capsule  Commonly known as: PRILOSEC     triamcinolone acetonide 0.1% 0.1 % cream  Commonly known as: KENALOG     zinc gluconate 50 mg tablet            Indwelling Lines/Drains at time of discharge:   Lines/Drains/Airways     Peripherally Inserted Central Catheter Line  Duration           PICC Double Lumen 06/14/23 1200 right cephalic 148 days                Time spent on the discharge of patient: 46 minutes         Dong Vogt MD  Department of Hospital Medicine  [facility] - Emergency Dept

## 2023-11-09 NOTE — HPI
88-year-old  female recently admitted and discharged from [facility] on 11/04/2023 she lives at the nursing home is under the care of Adis Encinas.  Consult was placed to hospice services on 11/08/2023 she met with fanta Forde in a but was having difficulty deciding if she wanted to resume palliative care move forward with hospice.  On the morning following admission pain is laying in bed she sounds like she has a lot of mucus in her throat, lung sounds are clear she is minimally responsive she does open her eyes to the side of my voice but does not answer any questions.  I have reviewed her recent hospital admit records she was recently treated for UTI that urinalysis on presentation here is clean with no evidence of infection, COVID flu negative, chest x-ray without any acute cardiopulmonary process or evidence of pneumonia or volume overload.  Laboratory parameters appear to be stable her lactic acid was elevated at 4.7.  Her daughter states that her oral intake has been poor she can only tolerate pureed foods and of course at this stage of her life we do not want to deprive her of any pleasure feeds.  We discussed the utility of a PEG tube and how it does not improve quality of life nor prolonged it and I did not think she will be a good surgical candidate for her to have this procedure in the daughter who is at the bedside who is Yvonne agrees that she does not want her mother to have a PEG.  She is amenable to hospice services, case management has been consulted I have placed a scopolamine patch on her given her 40 mg of Lasix and hopefully can help dry up some of her oral secretions I do think it is totally fine for her to be able to eat whatever she desires and that comfort care is where we are.  This was disclosed with the daughter and she is amenable to the plan.

## 2023-11-09 NOTE — CONSULTS
Spoke with Patient's Daughter, Yvonne Valdez ([phone])regarding family hospice Options. Yvonne provided verbal consent to be referred to [facility] . Spoke with Dong ([phone]) with [facility] and provided update. Hospice referral to [facility] initiated in [facility]. Patient will admitted to 5th floor and floor CM will resume patient care.

## 2024-02-08 ENCOUNTER — TELEPHONE (OUTPATIENT)
Dept: INTERNAL MEDICINE | Facility: CLINIC | Age: 89
End: 2024-02-08
Payer: MEDICARE

## 2024-02-08 DIAGNOSIS — E11.59 TYPE 2 DIABETES MELLITUS WITH OTHER CIRCULATORY COMPLICATIONS: Primary | ICD-10-CM

## 2024-02-08 DIAGNOSIS — N18.31 CHRONIC KIDNEY DISEASE, STAGE 3A: ICD-10-CM

## 2024-02-12 ENCOUNTER — PATIENT MESSAGE (OUTPATIENT)
Dept: INTERNAL MEDICINE | Facility: CLINIC | Age: 89
End: 2024-02-12
Payer: MEDICARE

## 2024-07-01 NOTE — ED PROVIDER NOTES
Encounter Date: 2022    SCRIBE #1 NOTE: I, Abilio Anand, am scribing for, and in the presence of,  Dr. Everett. I have scribed the following portions of the note - Other sections scribed: HPI, ROS, Physical Exam, MDM, Attending.       History     Chief Complaint   Patient presents with    Headache     C/o HA 1 hour     86 y/o AAF with history of HTN, PVD, Afib and dementia presents to ED for frontal HA onset around 0000 today.  Pt's daughter states that she was seen 4 days ago at [facility] after having a 2 minute episode of confusion.  CT and labs were unremarkable there.  Her PCP, Dr. Murray, recommended she see a neurologist.  She has an appointment with Dr. Murray on .  Pt states the HA has resolved.  She is on Eliquis and has no history of migraines.    The history is provided by the patient and a relative.   Headache   This is a new problem. Episode onset: 3 hours ago. The problem occurs constantly. The problem has been resolved. The pain is located in the frontal region. Her past medical history is significant for hypertension. There is no history of migraine headaches.     Review of patient's allergies indicates:   Allergen Reactions    Lisinopril Swelling    Fluconazole      Past Medical History:   Diagnosis Date    A-fib     Dementia     Foot ulcer, left     Hypertension     Paroxysmal atrial fibrillation     PVD (peripheral vascular disease)      Past Surgical History:   Procedure Laterality Date     SECTION  1973    Stent placement right thigh  2021     Family History   Problem Relation Age of Onset    Cancer Mother     Alzheimer's disease Father      Social History     Tobacco Use    Smoking status: Never Smoker    Smokeless tobacco: Never Used   Substance Use Topics    Alcohol use: Never    Drug use: Never     Review of Systems   Unable to perform ROS: Dementia   Neurological: Positive for headaches.       Physical Exam     Initial Vitals [22 0235]   BP Pulse  The patient's goals for the shift include      The clinical goals for the shift include pt to remains free from falls and pain free.       Resp Temp SpO2   (!) 150/72 81 16 98.2 °F (36.8 °C) 98 %      MAP       --         Physical Exam    Nursing note and vitals reviewed.  Constitutional: She is not diaphoretic. No distress.   HENT:   Head: Normocephalic and atraumatic.   Eyes: EOM are normal. Pupils are equal, round, and reactive to light.   Neck: Neck supple.   Normal range of motion.  Cardiovascular: Normal rate and regular rhythm.   No murmur heard.  Pulmonary/Chest: Breath sounds normal. No respiratory distress. She has no wheezes. She has no rales.   Abdominal: Abdomen is soft. She exhibits no distension. There is no abdominal tenderness.   Musculoskeletal:         General: Normal range of motion.      Cervical back: Normal range of motion and neck supple.     Neurological: She is alert. She has normal strength.   At neurological baseline per daughter bedside   Skin: Skin is warm. No rash noted.   Psychiatric: She has a normal mood and affect.         ED Course   Procedures  Labs Reviewed   COMPREHENSIVE METABOLIC PANEL - Abnormal; Notable for the following components:       Result Value    Glucose Level 128 (*)     Blood Urea Nitrogen 25.9 (*)     All other components within normal limits   URINALYSIS, REFLEX TO URINE CULTURE - Abnormal; Notable for the following components:    Protein, UA Trace (*)     Ketones, UA Trace (*)     Blood, UA 1+ (*)     All other components within normal limits   PROTIME-INR - Abnormal; Notable for the following components:    PT 15.5 (*)     All other components within normal limits   APTT - Abnormal; Notable for the following components:    PTT 42.6 (*)     All other components within normal limits   CBC WITH DIFFERENTIAL - Abnormal; Notable for the following components:    RBC 3.63 (*)     Hgb 10.2 (*)     Hct 30.9 (*)     MPV 10.5 (*)     All other components within normal limits   URINALYSIS, MICROSCOPIC - Abnormal; Notable for the following components:    RBC, UA 6 (*)     All other components within normal  limits   COVID/FLU A&B PCR - Normal   CBC W/ AUTO DIFFERENTIAL    Narrative:     The following orders were created for panel order CBC auto differential.  Procedure                               Abnormality         Status                     ---------                               -----------         ------                     CBC with Differential[[id number]]        Abnormal            Final result                 Please view results for these tests on the individual orders.          Imaging Results          CT Head Without Contrast (Final result)  Result time 08/21/22 08:03:28    Final result by Ronak Bailey MD (08/21/22 08:03:28)                 Impression:    Impression:    No acute intracranial findings identified.  Details as above.    No significant discrepancy with overnight report      Electronically signed by: Ronak Bailey  Date:    08/21/2022  Time:    08:03             Narrative:      Technique:CT of the head was performed without intravenous contrast with axial as well as coronal and sagittal images.    Comparison:Comparison is with study dated 2022-03-09 17:33:11.    Dosage Information:Automated exposure control was utilized.  DLP 1053    Clinical history:Headache.    Findings:There is no significant interval change.    Hemorrhage:No acute intracranial hemorrhage is seen.    CSF spaces:The ventricles, sulci and basal cisterns all appear moderately prominent consistent with global cerebral atrophy.    Brain parenchyma:There is no acute large vessel territory infarct. Moderate microvascular change is seen in portions of the periventricular and deep white matter tracts. Focal cystic encephalomalacia is again seen in the right occipital lobe (series 3, image 19).    Cerebellum:Unremarkable.    Sella and skull base:The sella appears to be within normal limits for age.    Calvarium:No acute linear or depressed skull fracture is seen.    Maxillofacial Structures:    Paranasal sinuses:The visualized  paranasal sinuses appear clear with no significant mucoperiosteal thickening or air fluid levels identified.                    Preliminary result by Interface, Rad Results In (08/21/22 02:57:01)                 Narrative:    START OF REPORT:  Technique: CT of the head was performed without intravenous contrast with axial as well as coronal and sagittal images.    Comparison: Comparison is with study [id number] 17:33:11.    Dosage Information: Automated exposure control was utilized.    Clinical history: Headache.    Findings: There is no significant interval change.  Hemorrhage: No acute intracranial hemorrhage is seen.  CSF spaces: The ventricles, sulci and basal cisterns all appear moderately prominent consistent with global cerebral atrophy.  Brain parenchyma: There is preservation of the grey white junction throughout. Moderate microvascular change is seen in portions of the periventricular and deep white matter tracts. Focal cystic encephalomalacia is again seen in the right occipital lobe (series 3, image 19).  Cerebellum: Unremarkable.  Sella and skull base: The sella appears to be within normal limits for age.  Herniation: None.  Intracranial calcifications: Incidental note is made of bilateral choroid plexus calcification. Incidental note is made of some pineal region calcification. Incidental note is made of some calcification of the falx.  Calvarium: No acute linear or depressed skull fracture is seen.    Maxillofacial Structures:  Paranasal sinuses: The visualized paranasal sinuses appear clear with no significant mucoperiosteal thickening or air fluid levels identified.  Orbits: The orbits appear unremarkable.  Zygomatic arches: The zygomatic arches are intact and unremarkable.  Temporal bones and mastoids: The temporal bones and mastoids appear unremarkable.  TMJ: The mandibular condyles appear normally placed with respect to the mandibular fossa.      Impression:  1. No acute intracranial  process identified. Details as above.                      Preliminary result by Ronak Bailey MD (08/21/22 02:57:01)                 Narrative:    START OF REPORT:  Technique: CT of the head was performed without intravenous contrast with axial as well as coronal and sagittal images.    Comparison: Comparison is with study dated2022-03-09 17:33:11.    Dosage Information: Automated exposure control was utilized.    Clinical history: Headache.    Findings: There is no significant interval change.  Hemorrhage: No acute intracranial hemorrhage is seen.  CSF spaces: The ventricles, sulci and basal cisterns all appear moderately prominent consistent with global cerebral atrophy.  Brain parenchyma: There is preservation of the grey white junction throughout. Moderate microvascular change is seen in portions of the periventricular and deep white matter tracts. Focal cystic encephalomalacia is again seen in the right occipital lobe (series 3, image 19).  Cerebellum: Unremarkable.  Sella and skull base: The sella appears to be within normal limits for age.  Herniation: None.  Intracranial calcifications: Incidental note is made of bilateral choroid plexus calcification. Incidental note is made of some pineal region calcification. Incidental note is made of some calcification of the falx.  Calvarium: No acute linear or depressed skull fracture is seen.    Maxillofacial Structures:  Paranasal sinuses: The visualized paranasal sinuses appear clear with no significant mucoperiosteal thickening or air fluid levels identified.  Orbits: The orbits appear unremarkable.  Zygomatic arches: The zygomatic arches are intact and unremarkable.  Temporal bones and mastoids: The temporal bones and mastoids appear unremarkable.  TMJ: The mandibular condyles appear normally placed with respect to the mandibular fossa.      Impression:  1. No acute intracranial process identified. Details as above.                                X-Rays:    Independently Interpreted Readings:   Head CT: No hemorrhage.     Medications - No data to display  Medical Decision Making:   History:   I obtained history from: someone other than patient.       <> Summary of History: Daughter   Old Medical Records: I decided to obtain old medical records.  Initial Assessment:   88 yo with dementia, hx as above presenting with headache earlier tonight, now  Resolved. Otherwise asymptomatic, well appearing on exam, normal vitals, awake and alert at her neurologic baseline per daughter bedside. Workup including UA, CT head unremarkable. Will discharge with PCP follow up. Return precautions given.   Clinical Tests:   Lab Tests: Ordered and Reviewed  Radiological Study: Ordered and Reviewed          Scribe Attestation:   Scribe #1: I performed the above scribed service and the documentation accurately describes the services I performed. I attest to the accuracy of the note.    Attending Attestation:           Physician Attestation for Scribe:  Physician Attestation Statement for Scribe #1: I, reviewed documentation, as scribed by Abilio Anand in my presence, and it is both accurate and complete.             ED Course as of 08/21/22 2155   Sun Aug 21, 2022   0454 Workup unremarkable, patient asymptomatic at this time. Will discharge with daughter. Return precautions given.  [AC]      ED Course User Index  [AC] Ilia Everett IV, MD             Clinical Impression:   Final diagnoses:  [R51.9] Nonintractable headache, unspecified chronicity pattern, unspecified headache type (Primary)          ED Disposition Condition    Discharge Stable        ED Prescriptions     None        Follow-up Information     Follow up With Specialties Details Why Contact Info    Dong Vogt MD Internal Medicine Schedule an appointment as soon as possible for a visit  As needed [address] [place] [id number]  [phone]      [facility] - Emergency Dept Emergency Medicine Go  to  If symptoms worsen [address] [facility] [address]  [phone]           Ilia Everett IV, MD  08/21/22 8263